# Patient Record
Sex: MALE | Race: BLACK OR AFRICAN AMERICAN | NOT HISPANIC OR LATINO | Employment: PART TIME | URBAN - METROPOLITAN AREA
[De-identification: names, ages, dates, MRNs, and addresses within clinical notes are randomized per-mention and may not be internally consistent; named-entity substitution may affect disease eponyms.]

---

## 2019-02-28 ENCOUNTER — TELEPHONE (OUTPATIENT)
Dept: NEUROLOGY | Facility: HOSPITAL | Age: 44
End: 2019-02-28

## 2019-02-28 DIAGNOSIS — R93.3 ABNORMAL FINDING ON GI TRACT IMAGING: Primary | ICD-10-CM

## 2019-02-28 DIAGNOSIS — R93.3 ABNORMAL FINDINGS ON EXAMINATION OF GASTROINTESTINAL TRACT: ICD-10-CM

## 2019-02-28 NOTE — TELEPHONE ENCOUNTER
----- Message from Isabell Hernandez sent at 2/27/2019  2:17 PM CST -----  Regarding: NEW  Contact: [phone]  [facility]    [facility]    Why do you need to be seen? Mesenteric Mass Primary unknown    Which doctor are you requesting? Tisha    You may contact patient back to schedule at: [phone] cell and home # [phone]    [facility] will be mailing CD's

## 2019-03-08 ENCOUNTER — TELEPHONE (OUTPATIENT)
Dept: NEUROLOGY | Facility: HOSPITAL | Age: 44
End: 2019-03-08

## 2019-03-08 DIAGNOSIS — C7A.00 MALIGNANT CARCINOID TUMOR OF UNKNOWN PRIMARY SITE: Primary | ICD-10-CM

## 2019-03-08 RX ORDER — DICYCLOMINE HYDROCHLORIDE 10 MG/1
10 CAPSULE ORAL
COMMUNITY
End: 2022-08-11

## 2019-03-08 RX ORDER — ONDANSETRON 4 MG/1
8 TABLET, ORALLY DISINTEGRATING ORAL
COMMUNITY
End: 2022-08-11

## 2019-03-08 RX ORDER — METOCLOPRAMIDE 10 MG/1
10 TABLET ORAL 4 TIMES DAILY
COMMUNITY
End: 2022-08-11

## 2019-03-08 NOTE — TELEPHONE ENCOUNTER
[facility].  Pt with mesenteric mass and suspected carcinoid tumor.  Pt had CT and MRI.  Ordered Ga 68 PET/CT and  tumor markers per protocol.  Appointment made with MD, info sent to patient.

## 2019-03-08 NOTE — TELEPHONE ENCOUNTER
----- Message from Isabell Hernandez sent at 2/27/2019  2:17 PM CST -----  Regarding: NEW  Contact: [phone]  [facility]    [facility]    Why do you need to be seen? Mesenteric Mass Primary unknown    Which doctor are you requesting? Tisha    You may contact patient back to schedule at: [phone] cell and home # [phone]    [facility] will be [facility] CD's

## 2019-03-14 ENCOUNTER — HOSPITAL ENCOUNTER (OUTPATIENT)
Dept: RADIOLOGY | Facility: HOSPITAL | Age: 44
Discharge: HOME | End: 2019-03-14
Attending: SURGERY
Payer: OTHER GOVERNMENT

## 2019-03-14 ENCOUNTER — OFFICE VISIT (OUTPATIENT)
Dept: NEUROLOGY | Facility: HOSPITAL | Age: 44
End: 2019-03-14
Attending: SURGERY
Payer: OTHER GOVERNMENT

## 2019-03-14 ENCOUNTER — DOCUMENTATION ONLY (OUTPATIENT)
Dept: NEUROLOGY | Facility: HOSPITAL | Age: 44
End: 2019-03-14

## 2019-03-14 VITALS
HEART RATE: 56 BPM | HEIGHT: 69 IN | WEIGHT: 241.5 LBS | SYSTOLIC BLOOD PRESSURE: 109 MMHG | BODY MASS INDEX: 35.77 KG/M2 | DIASTOLIC BLOOD PRESSURE: 71 MMHG | TEMPERATURE: 96 F

## 2019-03-14 DIAGNOSIS — C7B.8 SECONDARY NEUROENDOCRINE TUMOR OF DISTANT LYMPH NODES: ICD-10-CM

## 2019-03-14 DIAGNOSIS — C7A.012 MALIGNANT CARCINOID TUMOR OF ILEUM: Primary | ICD-10-CM

## 2019-03-14 DIAGNOSIS — C7A.00 MALIGNANT CARCINOID TUMOR OF UNKNOWN PRIMARY SITE: ICD-10-CM

## 2019-03-14 LAB
EXT 24 HR UR METANEPHRINE: ABNORMAL
EXT 24 HR UR NORMETANEPHRINE: ABNORMAL
EXT 24 HR UR NORMETANEPHRINE: ABNORMAL
EXT 25 HYDROXY VIT D2: ABNORMAL
EXT 25 HYDROXY VIT D3: ABNORMAL
EXT 5 HIAA 24 HR URINE: ABNORMAL
EXT 5 HIAA BLOOD: 37 NG/ML (ref 0–22)
EXT ACTH: ABNORMAL
EXT AFP: ABNORMAL
EXT ALBUMIN: 4.3 G/DL (ref 3.6–5.1)
EXT ALKALINE PHOSPHATASE: 83 U/L (ref 40–115)
EXT ALT: 42 U/L (ref 9–46)
EXT AMYLASE: ABNORMAL
EXT ANTI ISLET CELL AB: ABNORMAL
EXT ANTI PARIETAL CELL AB: ABNORMAL
EXT ANTI THYROID AB: ABNORMAL
EXT AST: 23 U/L (ref 10–40)
EXT BILIRUBIN DIRECT: ABNORMAL MG/DL
EXT BILIRUBIN TOTAL: 0.6 MG/DL (ref 0.2–1.2)
EXT BK VIRUS DNA QN PCR: ABNORMAL
EXT BUN: 11 MG/DL (ref 7–25)
EXT C PEPTIDE: ABNORMAL
EXT CA 125: ABNORMAL
EXT CA 19-9: ABNORMAL
EXT CA 27-29: ABNORMAL
EXT CALCITONIN: ABNORMAL
EXT CALCIUM: 9.4 MG/DL (ref 8.6–10.3)
EXT CEA: ABNORMAL
EXT CHLORIDE: 102 MMOL/L (ref 98–110)
EXT CHOLESTEROL: ABNORMAL
EXT CHROMOGRANIN A: 95 NG/ML (ref 25–140)
EXT CO2: 29 MMOL/L (ref 20–32)
EXT CREATININE UA: ABNORMAL
EXT CREATININE: 1.03 MG/DL (ref 0.6–1.35)
EXT CYCLOSPORONE LEVEL: ABNORMAL
EXT DOPAMINE: ABNORMAL
EXT EBV DNA BY PCR: ABNORMAL
EXT EPINEPHRINE: ABNORMAL
EXT FOLATE: ABNORMAL
EXT FREE T3: ABNORMAL
EXT FREE T4: ABNORMAL
EXT FSH: ABNORMAL
EXT GASTRIN RELEASING PEPTIDE: ABNORMAL
EXT GASTRIN RELEASING PEPTIDE: ABNORMAL
EXT GASTRIN: ABNORMAL
EXT GGT: ABNORMAL
EXT GHRELIN: ABNORMAL
EXT GLUCAGON: ABNORMAL
EXT GLUCOSE: 93 MG/DL (ref 65–139)
EXT GROWTH HORMONE: ABNORMAL
EXT HCV RNA QUANT PCR: ABNORMAL
EXT HDL: ABNORMAL
EXT HEMATOCRIT: ABNORMAL
EXT HEMOGLOBIN A1C: ABNORMAL %
EXT HEMOGLOBIN: ABNORMAL
EXT HISTAMINE 24 HR URINE: ABNORMAL
EXT HISTAMINE: ABNORMAL
EXT IGF-1: ABNORMAL
EXT IMMUNKNOW (NON-STIMULATED): ABNORMAL
EXT IMMUNKNOW (STIMULATED): ABNORMAL
EXT INR: ABNORMAL
EXT INSULIN: ABNORMAL
EXT LANREOTIDE LEVEL: ABNORMAL
EXT LDH, TOTAL: ABNORMAL
EXT LDL CHOLESTEROL: ABNORMAL
EXT LIPASE: ABNORMAL
EXT MAGNESIUM: ABNORMAL
EXT METANEPHRINE FREE PLASMA: ABNORMAL
EXT MOTILIN: ABNORMAL
EXT NEUROKININ A CAMB: ABNORMAL
EXT NEUROKININ A ISI: <10 PG/ML (ref 0–40)
EXT NEUROTENSIN: ABNORMAL
EXT NOREPINEPHRINE: ABNORMAL
EXT NORMETANEPHRINE: ABNORMAL
EXT NSE: ABNORMAL
EXT OCTREOTIDE LEVEL: ABNORMAL
EXT PANCREASTATIN CAMB: ABNORMAL
EXT PANCREASTATIN ISI: 179 PG/ML (ref 10–135)
EXT PANCREATIC POLYPEPTIDE: ABNORMAL
EXT PHOSPHORUS: ABNORMAL
EXT PLATELETS: ABNORMAL
EXT POTASSIUM: 4.2 MMOL/L (ref 3.5–5.3)
EXT PROGRAF LEVEL: ABNORMAL
EXT PROLACTIN: ABNORMAL
EXT PROTEIN TOTAL: 7.3 G/DL (ref 6.1–8.1)
EXT PROTEIN UA: ABNORMAL
EXT PT: ABNORMAL
EXT PTH, INTACT: ABNORMAL
EXT PTT: ABNORMAL
EXT RAPAMUNE LEVEL: ABNORMAL
EXT SEROTONIN: 1139 NG/ML (ref 56–244)
EXT SODIUM: 138 MMOL/L (ref 135–146)
EXT SOMATOSTATIN: ABNORMAL
EXT SUBSTANCE P: ABNORMAL
EXT TRIGLYCERIDES: ABNORMAL
EXT TRYPTASE: ABNORMAL
EXT TSH: ABNORMAL
EXT URIC ACID: ABNORMAL
EXT URINE AMYLASE U/HR: ABNORMAL
EXT URINE AMYLASE U/L: ABNORMAL
EXT VASOACTIVE INTESTINAL POLYPEPTIDE: ABNORMAL
EXT VITAMIN B12: ABNORMAL
EXT VMA 24 HR URINE: ABNORMAL
EXT WBC: ABNORMAL
NEURON SPECIFIC ENOLASE: ABNORMAL

## 2019-03-14 PROCEDURE — 78815 PET IMAGE W/CT SKULL-THIGH: CPT | Mod: 26,PS,, | Performed by: RADIOLOGY

## 2019-03-14 PROCEDURE — A9587 GALLIUM GA-68: HCPCS | Mod: TB

## 2019-03-14 PROCEDURE — 78815 NM PET 68GA DOTATATE WHOLE BODY: ICD-10-PCS | Mod: 26,PS,, | Performed by: RADIOLOGY

## 2019-03-14 PROCEDURE — 78815 PET IMAGE W/CT SKULL-THIGH: CPT | Mod: TC

## 2019-03-14 PROCEDURE — 99213 OFFICE O/P EST LOW 20 MIN: CPT | Mod: 25 | Performed by: SURGERY

## 2019-03-14 NOTE — PROGRESS NOTES
Study Name: NETDetect (A Study of Human Tumor Angiogenesis Using a Fibrin-Thrombin Clot Model)  IRB#: [facility] #[id number]/[facility] 2011.038.C    Spoke with patient and spouse (Emerald) at length regarding their participation in the NETDetect study. Both patient and wife agreed to participate in study. After informed consent was obtained from patient and spouse, I went through the demo kit and the Instructions for Use. Patient and spouse understand how to use kit and all questions were answered.

## 2019-03-14 NOTE — PROGRESS NOTES
"NOLANETS:  [facility]  A collaboration between [facility] and [facility]      PATIENT: Aubrey Gallagher  MRN: [id number]  DATE: 3/14/2019    Subjective:      Chief Complaint: Consult (NET pt woyj ,esemteroc ,ass/no PATH yet/ref  by [facility])    In February went to [facility] second visit  for abdominal pain. excruciting pain for 4 hours with no nausea or vomiting or diarrhea. CT scan showed mesenteric mass. MRI done confirmed mass. Had EGD and colonoscopy done there did not reveal etiology.  Had Gallium scan done here  No pain now or any symptoms    Vitals:   Vitals:    03/14/19 1214   BP: 109/71   Pulse: (!) 56   Temp: 96 °F (35.6 °C)   TempSrc: Oral   Weight: 109.5 kg (241 lb 8.2 oz)   Height: 5' 9" (1.753 m)        Karnofsky Score:     Diagnosis: No diagnosis found.     Oncologic History:     Interval History:     Past Medical History:  Past Medical History:   Diagnosis Date    Mesenteric mass        Past Surgical History:  History reviewed. No pertinent surgical history.    Family History:  History reviewed. No pertinent family history.    Allergies:  Review of patient's allergies indicates:   Allergen Reactions    Epinephrine      Neuroendocrine Tumor patient         Medications:  Current Outpatient Medications   Medication Sig Dispense Refill    dicyclomine (BENTYL) 10 MG capsule Take 10 mg by mouth 4 (four) times daily before meals and nightly.      metoclopramide HCl (REGLAN) 10 MG tablet Take 10 mg by mouth 4 (four) times daily.      ondansetron (ZOFRAN-ODT) 4 MG TbDL Take 8 mg by mouth every 12 (twelve) hours.       No current facility-administered medications for this visit.        Review of Systems   Constitutional: Negative for activity change, appetite change, chills, diaphoresis, fatigue, fever and unexpected weight change.   HENT: Negative for congestion, dental problem, drooling, ear discharge, ear pain, facial " swelling, mouth sores, postnasal drip, sinus pain, sneezing, sore throat, tinnitus, trouble swallowing and voice change.    Eyes: Negative for photophobia, pain, discharge and itching.   Respiratory: Negative for apnea, cough, shortness of breath, wheezing and stridor.    Cardiovascular: Negative for chest pain and leg swelling.   Gastrointestinal: Positive for abdominal pain. Negative for abdominal distention, blood in stool, nausea, rectal pain and vomiting.   Endocrine: Negative for cold intolerance, heat intolerance, polydipsia and polyphagia.   Genitourinary: Negative.    Musculoskeletal: Negative for back pain, gait problem, joint swelling, myalgias, neck pain and neck stiffness.   Skin: Negative for pallor, rash and wound.   Allergic/Immunologic: Negative for environmental allergies and immunocompromised state.   Neurological: Negative for dizziness, seizures, syncope, facial asymmetry, numbness and headaches.   Hematological: Negative for adenopathy. Does not bruise/bleed easily.   Psychiatric/Behavioral: Negative for confusion and decreased concentration.      Objective:      Physical Exam   Constitutional: He is oriented to person, place, and time. He appears well-developed and well-nourished. No distress.   HENT:   Head: Normocephalic and atraumatic.   Right Ear: External ear normal.   Left Ear: External ear normal.   Eyes: Conjunctivae and EOM are normal. Pupils are equal, round, and reactive to light.   Neck: Normal range of motion.   Cardiovascular: Regular rhythm.   Pulmonary/Chest: Effort normal and breath sounds normal.   Abdominal: Soft. Bowel sounds are normal. He exhibits no mass. There is no rebound and no guarding. No hernia.   Musculoskeletal: Normal range of motion.   Neurological: He is alert and oriented to person, place, and time.   Skin: Skin is warm. He is not diaphoretic.   Psychiatric: He has a normal mood and affect. His behavior is normal. Thought content normal.      Assessment:        No diagnosis found.    Laboratory Data:   OQOax Viewer      Show images for NM PET Ga68 Dotatate Whole Body   PACS Images for ViTAL [place] Viewer (Includes Mister Mario Images)      Show images for NM PET Ga68 Dotatate Whole Body   NM PET Ga68 Dotatate Whole Body   Order: [id number]   Status:  Final result   Visible to patient:  No (Not Released) Next appt:  None Dx:  Malignant carcinoid tumor of unknown ...   Details     Reading Physician Reading Date Result Priority   Hernan Mendiola MD 3/14/2019    Emanuel Palomino MD 3/14/2019       Narrative     EXAMINATION:  NM PET 68GA DOTATATE WHOLE BODY    CLINICAL HISTORY:  Malignant carcinoid tumor of unspecified site    TECHNIQUE:  5.2 mCi of GA68 Dotatate was administered intravenously in the right antecubital fossa. After an approximately 60 min distribution time, PET/CT images were acquired from the skull vertex to the mid thigh. Transmission images were acquired to correct for attenuation using a whole body low-dose CT scan without contrast with the arms positioned above the head.    COMPARISON:  Outside hospital CT 02/20/2019.    FINDINGS:  Quality of the study: Adequate.    There is a 3.6 cm mesenteric mass demonstrating SUV max of 40.6. No other abnormal foci of increased tracer uptake are present.    Physiologic uptake of the tracer is seen within the pituitary, liver, spleen, kidneys, adrenal glands and bowel.    Incidental CT findings: Heart is enlarged with trace pericardial fluid.  Rectum is filled with stool.  Appendix is normal.      Impression       FDG-avid mesenteric mass concerning for carcinoid.  No evidence of metastasis.    Electronically signed by resident: Emanuel Palomino  Date: 03/14/2019  Time: 08:51    Electronically signed by: Hernan Mendiola MD  Date: 03/14/2019  Time: 09:58             Last Resulted: 03/14/19 09:58 Order Details View Encounter Lab and Collection Details Routing Result History            External Result Report      External Result Report   Narrative     EXAMINATION:  NM PET 68GA DOTATATE WHOLE BODY    CLINICAL HISTORY:  Malignant carcinoid tumor of unspecified site    TECHNIQUE:  5.2 mCi of GA68 Dotatate was administered intravenously in the right antecubital fossa. After an approximately 60 min distribution time, PET/CT images were acquired from the skull vertex to the mid thigh. Transmission images were acquired to correct for attenuation using a whole body low-dose CT scan without contrast with the arms positioned above the head.    COMPARISON:  Outside hospital CT 02/20/2019.    FINDINGS:  Quality of the study: Adequate.    There is a 3.6 cm mesenteric mass demonstrating SUV max of 40.6. No other abnormal foci of increased tracer uptake are present.    Physiologic uptake of the tracer is seen within the pituitary, liver, spleen, kidneys, adrenal glands and bowel.    Incidental CT findings: Heart             Impression:  This is a 43-year-old gentleman who went to the emergency room with excruciating abdominal pain and workup showed mesenteric mass.  [facility] and was referred here he has a gallium scan that is consistent with mesenteric mass with a positive somatostatin receptor uptake.  He has ileal primary with mesenteric mass.  He requires carcinoid labs, echocardiogram and an explored laparotomy with small-bowel resection, mesenteric lymphadenectomy, cholecystectomy and exploration of the liver.    I spent a lot of time talking to him and his wife about his condition the natural history of the carcinoid disease.  The difference between the carcinoid and other cancers.   I also talked to them about the treatment options.   IA outlined about the surgery the recovery time the risks and benefits of the surgery such as bleeding infection DVT PE MI redo surgery for leaks and bleeding.  I also informed them the the purpose of surgery is to debulk and not to be curative and  there is a chance of recurrence and usually happens in about 6-10 years time.  I emphasized the importance of for close follow-up with scans as this tumor needs to be followed up. At the time of exploration in addition to the primary and the javier Mets I have also a explore the other aspects of the belly and liver and make sure there is no other disease involved.   would be the basis for him requiring long-acting octreotide therapy.    I answered all the questions a showed them the picture elaborated the surgery and the recovery time and long-term plans.    I also talked about other studies where we preserve our tumor specimen RNA later solution and also talked to him about other stool studies that he apparently doing.  Plan:       Carcinoid tumor markers  Cardiac echo  Plan for ex lap small-bowel resection mesenteric lymphadenectomy cholecystectomy.  Spent in spent more than 45 min in talking to them and explained to him about the surgery                  MILENA Plummer MD, FACS   Associate Professor of Surgery, [facility]   Neuroendocrine Surgery, Hepatic/Pancreatic & General Surgery   [address]   [place] [address]   ph. [phone]; [phone]   fax. [phone]

## 2019-03-14 NOTE — PATIENT INSTRUCTIONS
Blood work / Lab work / Tumor markers  Get lab work drawn today --- [facility] [address]    Echo every year - due now --- orders given to patient    Surgery: patient will call the office back with a decision to proceed with surgery

## 2019-04-15 ENCOUNTER — TELEPHONE (OUTPATIENT)
Dept: NEUROLOGY | Facility: HOSPITAL | Age: 44
End: 2019-04-15

## 2019-04-22 ENCOUNTER — TELEPHONE (OUTPATIENT)
Dept: NEUROLOGY | Facility: HOSPITAL | Age: 44
End: 2019-04-22

## 2019-04-22 NOTE — TELEPHONE ENCOUNTER
----- Message from Jessica Corwley sent at 4/22/2019 11:07 AM CDT -----  Contact: wife/Emerald   [phone]  KORY--Patient wife calling to speak with you about scheduling the patient surgery.  Please call back to assist [phone]

## 2019-04-22 NOTE — TELEPHONE ENCOUNTER
Emerald, wife, requesting if Dr. Guardado has responding to request to have surgery in Oct/Nov 2019.  Pt has intermittent abdominal pain but would like to wait until later this year. Pt to be notified with Dr. Guardado's recommendations.

## 2019-04-30 ENCOUNTER — TELEPHONE (OUTPATIENT)
Dept: NEUROLOGY | Facility: HOSPITAL | Age: 44
End: 2019-04-30

## 2019-04-30 ENCOUNTER — PATIENT MESSAGE (OUTPATIENT)
Dept: NEUROLOGY | Facility: HOSPITAL | Age: 44
End: 2019-04-30

## 2019-04-30 NOTE — TELEPHONE ENCOUNTER
Message placed on voicemail.  Dr. Guardado recommend if pt in pain, surgery is recommend sooner than later.

## 2019-05-25 ENCOUNTER — OFFICE VISIT (OUTPATIENT)
Dept: URGENT CARE | Facility: CLINIC | Age: 44
End: 2019-05-25
Payer: OTHER GOVERNMENT

## 2019-05-25 DIAGNOSIS — R76.11 NEWLY CONVERTED POSITIVE PPD TEST: Primary | ICD-10-CM

## 2019-05-25 PROCEDURE — 99499 NO LOS: ICD-10-PCS | Mod: S$GLB,,, | Performed by: FAMILY MEDICINE

## 2019-05-25 PROCEDURE — 71045 X-RAY EXAM CHEST 1 VIEW: CPT | Mod: S$GLB,,, | Performed by: RADIOLOGY

## 2019-05-25 PROCEDURE — 99499 UNLISTED E&M SERVICE: CPT | Mod: S$GLB,,, | Performed by: FAMILY MEDICINE

## 2019-05-25 PROCEDURE — 71045 XR CHEST 1 VIEW: ICD-10-PCS | Mod: S$GLB,,, | Performed by: RADIOLOGY

## 2019-05-25 NOTE — PROGRESS NOTES
Pt is here for a positive TB read - 11 mm induration.  Asymptomatic.  Denies fever, chills, cough, chest pain, night sweats.  No history of TB or positive TB result in the past.    Xr Chest 1 View    Result Date: 5/25/2019  EXAMINATION: XR CHEST 1 VIEW CLINICAL HISTORY: Nonspecific reaction to tuberculin skin test without active tuberculosis TECHNIQUE: Single frontal view of the chest was performed. COMPARISON: None FINDINGS: The lungs are clear, with normal appearance of pulmonary vasculature and no pleural effusion or pneumothorax. The cardiac silhouette is normal in size. The hilar and mediastinal contours are unremarkable. Bones are intact.     No acute abnormality. Electronically signed by: Hernan Mendiola MD Date:    05/25/2019 Time:    13:29

## 2019-05-27 ENCOUNTER — TELEPHONE (OUTPATIENT)
Dept: URGENT CARE | Facility: CLINIC | Age: 44
End: 2019-05-27

## 2019-05-27 NOTE — TELEPHONE ENCOUNTER
Called patient to inform him of official negative chest x-ray read by Radiology, and that he is being referred to ID for positive PPD reading.  No answer, unable to the message.

## 2019-05-27 NOTE — PROGRESS NOTES
Subjective:       Patient ID: Aubrey Gallagher is a 44 y.o. male.    Vitals:  vitals were not taken for this visit.     Chief Complaint: No chief complaint on file.    Patient presents for PPD reading, ppd test reason was routine for [facility]..  Reading was positive at 11 mm induration.  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  He has not had any previous positive TB tests.  He has never had a BCG vaccine.  Patient denies any symptoms at this time.  Denies weight loss, fatigue, fever, cough/hemoptysis.       Constitution: Negative for chills, sweating, fatigue and fever.   HENT: Negative for ear pain, congestion, sinus pain, sinus pressure, sore throat and voice change.    Neck: Negative for painful lymph nodes.   Eyes: Negative for eye redness.   Respiratory: Negative for chest tightness, cough, sputum production, bloody sputum, COPD, shortness of breath, stridor, wheezing and asthma.    Gastrointestinal: Negative for nausea and vomiting.   Musculoskeletal: Negative for muscle ache.   Skin: Negative for rash.   Allergic/Immunologic: Negative for seasonal allergies and asthma.   Hematologic/Lymphatic: Negative for swollen lymph nodes.       Objective:      Physical Exam   Constitutional: He is oriented to person, place, and time. He appears well-developed and well-nourished. He is cooperative.  Non-toxic appearance. He does not appear ill. No distress.   HENT:   Head: Normocephalic and atraumatic.   Right Ear: Hearing, tympanic membrane, external ear and ear canal normal.   Left Ear: Hearing, tympanic membrane, external ear and ear canal normal.   Nose: Nose normal. No mucosal edema, rhinorrhea or nasal deformity. No epistaxis. Right sinus exhibits no maxillary sinus tenderness and no frontal sinus tenderness. Left sinus exhibits no maxillary sinus tenderness and no frontal sinus tenderness.   Mouth/Throat: Uvula is midline, oropharynx is clear and moist and mucous membranes are normal.  No trismus in the jaw. Normal dentition. No uvula swelling. No posterior oropharyngeal erythema.   Eyes: Conjunctivae and lids are normal. No scleral icterus.   Sclera clear bilat   Neck: Trachea normal, full passive range of motion without pain and phonation normal. Neck supple.   Cardiovascular: Normal rate, regular rhythm, normal heart sounds, intact distal pulses and normal pulses.   Pulmonary/Chest: Effort normal and breath sounds normal. No respiratory distress.   Abdominal: Soft. Normal appearance and bowel sounds are normal. He exhibits no distension. There is no tenderness.   Musculoskeletal: Normal range of motion. He exhibits no edema or deformity.   Neurological: He is alert and oriented to person, place, and time. He exhibits normal muscle tone. Coordination normal.   Skin: Skin is warm, dry and intact. He is not diaphoretic. No pallor.   Psychiatric: He has a normal mood and affect. His speech is normal and behavior is normal. Judgment and thought content normal. Cognition and memory are normal.   Nursing note and vitals reviewed.        Xr Chest 1 View    Result Date: 5/25/2019  EXAMINATION: XR CHEST 1 VIEW CLINICAL HISTORY: Nonspecific reaction to tuberculin skin test without active tuberculosis TECHNIQUE: Single frontal view of the chest was performed. COMPARISON: None FINDINGS: The lungs are clear, with normal appearance of pulmonary vasculature and no pleural effusion or pneumothorax. The cardiac silhouette is normal in size. The hilar and mediastinal contours are unremarkable. Bones are intact.     No acute abnormality. Electronically signed by: Hernan Mendiola MD Date:    05/25/2019 Time:    13:29    Assessment:       1. Newly converted positive PPD test        Plan:         Newly converted positive PPD test  -     XR CHEST 1 VIEW; Future; Expected date: 05/25/2019  -     Ambulatory referral to Infectious Disease

## 2019-06-04 ENCOUNTER — OFFICE VISIT (OUTPATIENT)
Dept: INFECTIOUS DISEASES | Facility: CLINIC | Age: 44
End: 2019-06-04
Payer: OTHER GOVERNMENT

## 2019-06-04 ENCOUNTER — TELEPHONE (OUTPATIENT)
Dept: NEUROLOGY | Facility: HOSPITAL | Age: 44
End: 2019-06-04

## 2019-06-04 ENCOUNTER — LAB VISIT (OUTPATIENT)
Dept: LAB | Facility: HOSPITAL | Age: 44
End: 2019-06-04
Payer: OTHER GOVERNMENT

## 2019-06-04 VITALS
WEIGHT: 246.69 LBS | HEIGHT: 69 IN | BODY MASS INDEX: 36.54 KG/M2 | HEART RATE: 57 BPM | DIASTOLIC BLOOD PRESSURE: 79 MMHG | SYSTOLIC BLOOD PRESSURE: 123 MMHG | TEMPERATURE: 99 F

## 2019-06-04 DIAGNOSIS — Z22.7 TB LUNG, LATENT: ICD-10-CM

## 2019-06-04 DIAGNOSIS — Z22.7 TB LUNG, LATENT: Primary | ICD-10-CM

## 2019-06-04 LAB
ALBUMIN SERPL BCP-MCNC: 3.9 G/DL (ref 3.5–5.2)
ALP SERPL-CCNC: 78 U/L (ref 55–135)
ALT SERPL W/O P-5'-P-CCNC: 32 U/L (ref 10–44)
ANION GAP SERPL CALC-SCNC: 8 MMOL/L (ref 8–16)
AST SERPL-CCNC: 24 U/L (ref 10–40)
BASOPHILS # BLD AUTO: 0.07 K/UL (ref 0–0.2)
BASOPHILS NFR BLD: 1.2 % (ref 0–1.9)
BILIRUB SERPL-MCNC: 0.5 MG/DL (ref 0.1–1)
BUN SERPL-MCNC: 14 MG/DL (ref 6–20)
CALCIUM SERPL-MCNC: 9.8 MG/DL (ref 8.7–10.5)
CHLORIDE SERPL-SCNC: 104 MMOL/L (ref 95–110)
CO2 SERPL-SCNC: 27 MMOL/L (ref 23–29)
CREAT SERPL-MCNC: 1.1 MG/DL (ref 0.5–1.4)
DIFFERENTIAL METHOD: NORMAL
EOSINOPHIL # BLD AUTO: 0 K/UL (ref 0–0.5)
EOSINOPHIL NFR BLD: 0.7 % (ref 0–8)
ERYTHROCYTE [DISTWIDTH] IN BLOOD BY AUTOMATED COUNT: 13.9 % (ref 11.5–14.5)
EST. GFR  (AFRICAN AMERICAN): >60 ML/MIN/1.73 M^2
EST. GFR  (NON AFRICAN AMERICAN): >60 ML/MIN/1.73 M^2
GLUCOSE SERPL-MCNC: 116 MG/DL (ref 70–110)
HCT VFR BLD AUTO: 46.1 % (ref 40–54)
HGB BLD-MCNC: 15 G/DL (ref 14–18)
HIV 1+2 AB+HIV1 P24 AG SERPL QL IA: NEGATIVE
IMM GRANULOCYTES # BLD AUTO: 0.01 K/UL (ref 0–0.04)
IMM GRANULOCYTES NFR BLD AUTO: 0.2 % (ref 0–0.5)
LYMPHOCYTES # BLD AUTO: 1.5 K/UL (ref 1–4.8)
LYMPHOCYTES NFR BLD: 26.9 % (ref 18–48)
MCH RBC QN AUTO: 27.9 PG (ref 27–31)
MCHC RBC AUTO-ENTMCNC: 32.5 G/DL (ref 32–36)
MCV RBC AUTO: 86 FL (ref 82–98)
MONOCYTES # BLD AUTO: 0.5 K/UL (ref 0.3–1)
MONOCYTES NFR BLD: 9.1 % (ref 4–15)
NEUTROPHILS # BLD AUTO: 3.5 K/UL (ref 1.8–7.7)
NEUTROPHILS NFR BLD: 61.9 % (ref 38–73)
NRBC BLD-RTO: 0 /100 WBC
PLATELET # BLD AUTO: 254 K/UL (ref 150–350)
PMV BLD AUTO: 10.4 FL (ref 9.2–12.9)
POTASSIUM SERPL-SCNC: 4.4 MMOL/L (ref 3.5–5.1)
PROT SERPL-MCNC: 7.4 G/DL (ref 6–8.4)
RBC # BLD AUTO: 5.38 M/UL (ref 4.6–6.2)
SODIUM SERPL-SCNC: 139 MMOL/L (ref 136–145)
WBC # BLD AUTO: 5.69 K/UL (ref 3.9–12.7)

## 2019-06-04 PROCEDURE — 86703 HIV-1/HIV-2 1 RESULT ANTBDY: CPT

## 2019-06-04 PROCEDURE — 99203 OFFICE O/P NEW LOW 30 MIN: CPT | Mod: S$PBB,,, | Performed by: INTERNAL MEDICINE

## 2019-06-04 PROCEDURE — 85025 COMPLETE CBC W/AUTO DIFF WBC: CPT

## 2019-06-04 PROCEDURE — 99999 PR PBB SHADOW E&M-EST. PATIENT-LVL III: CPT | Mod: PBBFAC,,, | Performed by: INTERNAL MEDICINE

## 2019-06-04 PROCEDURE — 36415 COLL VENOUS BLD VENIPUNCTURE: CPT

## 2019-06-04 PROCEDURE — 80053 COMPREHEN METABOLIC PANEL: CPT

## 2019-06-04 PROCEDURE — 99213 OFFICE O/P EST LOW 20 MIN: CPT | Mod: PBBFAC | Performed by: INTERNAL MEDICINE

## 2019-06-04 PROCEDURE — 99999 PR PBB SHADOW E&M-EST. PATIENT-LVL III: ICD-10-PCS | Mod: PBBFAC,,, | Performed by: INTERNAL MEDICINE

## 2019-06-04 PROCEDURE — 99203 PR OFFICE/OUTPT VISIT, NEW, LEVL III, 30-44 MIN: ICD-10-PCS | Mod: S$PBB,,, | Performed by: INTERNAL MEDICINE

## 2019-06-04 NOTE — TELEPHONE ENCOUNTER
----- Message from Sol JOINERMichael Mohr sent at 6/4/2019  1:53 PM CDT -----  Contact: [phone] self   KORY    Pt is requesting to speak you concerning his positive TB test. He would also like to ask you questions about a procedure. Please advise

## 2019-06-04 NOTE — TELEPHONE ENCOUNTER
Pt recently dx with latent tb with moderate risk.  Pt requesting to talk to Dr. Guardado on choice of medication regimen.  Clinic appt scheduled with pt on Monday, 6/10/19 at 915am.  Pt acknowledged understanding.

## 2019-06-04 NOTE — PROGRESS NOTES
Subjective:      Patient ID: Aubrey Gallagher is a 44 y.o. male.    Chief Complaint:No chief complaint on file.      History of Present Illness  Case of 45 y/o male with PMHx: carcinoid tumor. Patient comes to ID for evaluation after positive routine yearly TST done as part of [facility], [place] screening for TB. Resulted >11mm induration. Patient denied to his knowledge having previous positive TB test. 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 Reports during his training had an interaction with a person that was later diagnosed with TB. Denied fevers, night sweats, unintentional weight loss, cough, SOB. Patient has traveled to the [place] and [facility] as part of Cruise vacations. 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 Denied contact with homeless or incarcerated people. Not currently on any home meds but will have in the near future ExLap for carcinoid tumor resection.             Review of Systems   Constitution: Negative for chills, decreased appetite, fever, malaise/fatigue, night sweats, weight gain and weight loss.   HENT: Negative for congestion, ear pain, hearing loss, hoarse voice, sore throat and tinnitus.    Eyes: Negative for blurred vision, redness and visual disturbance.   Cardiovascular: Negative for chest pain, leg swelling and palpitations.   Respiratory: Negative for cough, hemoptysis, shortness of breath and sputum production.    Hematologic/Lymphatic: Negative for adenopathy. Does not bruise/bleed easily.   Skin: Negative for dry skin, itching, rash and suspicious lesions.   Musculoskeletal: Negative for back pain, joint pain, myalgias and neck pain.   Gastrointestinal: Negative for abdominal pain, constipation, diarrhea, heartburn, nausea and vomiting.   Genitourinary: Negative for dysuria, flank pain, frequency, hematuria, hesitancy and urgency.   Neurological: Negative for dizziness, headaches, numbness, paresthesias and weakness.   Psychiatric/Behavioral: Negative for depression  and memory loss. The patient does not have insomnia and is not nervous/anxious.      Objective:   Physical Exam   Constitutional: He is oriented to person, place, and time. He appears well-developed and well-nourished.   HENT:   Head: Normocephalic and atraumatic.   Eyes: Pupils are equal, round, and reactive to light. Conjunctivae and EOM are normal.   Neck: Normal range of motion. Neck supple.   Cardiovascular: Normal rate, regular rhythm and normal heart sounds.   Pulmonary/Chest: Effort normal and breath sounds normal.   Abdominal: Soft. Bowel sounds are normal. He exhibits no distension. There is no tenderness. There is no rebound.   Musculoskeletal: Normal range of motion. He exhibits no edema, tenderness or deformity.   Neurological: He is alert and oriented to person, place, and time.   Skin: No rash noted. No erythema.     Assessment:       1. 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 Patient at this time not experiencing any symptoms to suggest active TB infection, review of CXR with no evidence of cavitary lesions or adenopathy. At this time recommend to pursue treatment for latent TB. Because patient will undergo procedure in the near future recommend INH with B6 for 9 month period to avoid potential drug-drug interaction if started on medications during or after surgical procedure. Obtained baseline CMP and CBC that are WNL as well as HIV test pending result. Patient ambivalent of initiating treatment. Counseled extensively on lifetime risk of developing active TB 5-10% as well as increased risk if developed immunocompromising illness or require use of biologics in the future. Strongly advised to pursue therapy. Will arrange follow up in 1 month.       Plan:       TB lung, latent  -     Comprehensive metabolic panel; Future; Expected date: 06/04/2019  -     HIV 1/2 Ag/Ab (4th Gen); Future; Expected date: 06/04/2019  -     CBC  auto differential; Future; Expected date: 06/04/2019      RTC in 1 month

## 2019-06-10 ENCOUNTER — OFFICE VISIT (OUTPATIENT)
Dept: NEUROLOGY | Facility: HOSPITAL | Age: 44
End: 2019-06-10
Attending: SURGERY
Payer: OTHER GOVERNMENT

## 2019-06-10 VITALS
DIASTOLIC BLOOD PRESSURE: 71 MMHG | HEART RATE: 57 BPM | TEMPERATURE: 98 F | BODY MASS INDEX: 36.54 KG/M2 | SYSTOLIC BLOOD PRESSURE: 120 MMHG | HEIGHT: 69 IN | WEIGHT: 246.69 LBS

## 2019-06-10 DIAGNOSIS — R10.84 GENERALIZED ABDOMINAL PAIN: ICD-10-CM

## 2019-06-10 DIAGNOSIS — C7A.012 MALIGNANT CARCINOID TUMOR OF ILEUM: Primary | ICD-10-CM

## 2019-06-10 PROCEDURE — 99215 OFFICE O/P EST HI 40 MIN: CPT | Performed by: SURGERY

## 2019-06-10 RX ORDER — PREGABALIN 75 MG/1
75 CAPSULE ORAL
Status: CANCELLED | OUTPATIENT
Start: 2019-06-10

## 2019-06-10 RX ORDER — FAMOTIDINE 10 MG/ML
20 INJECTION INTRAVENOUS
Status: CANCELLED | OUTPATIENT
Start: 2019-06-10

## 2019-06-10 RX ORDER — ACETAMINOPHEN 500 MG
1000 TABLET ORAL
Status: CANCELLED | OUTPATIENT
Start: 2019-06-10 | End: 2019-06-10

## 2019-06-10 RX ORDER — ENOXAPARIN SODIUM 100 MG/ML
30 INJECTION SUBCUTANEOUS
Status: CANCELLED | OUTPATIENT
Start: 2019-06-10

## 2019-06-10 RX ORDER — ONDANSETRON 2 MG/ML
8 INJECTION INTRAMUSCULAR; INTRAVENOUS
Status: CANCELLED | OUTPATIENT
Start: 2019-06-10

## 2019-06-10 RX ORDER — KETOROLAC TROMETHAMINE 15 MG/ML
15 INJECTION, SOLUTION INTRAMUSCULAR; INTRAVENOUS
Status: CANCELLED | OUTPATIENT
Start: 2019-06-10 | End: 2019-06-13

## 2019-06-10 NOTE — PROGRESS NOTES
"NOLANETS:  [facility]  A collaboration between [facility] and [facility]      PATIENT: Aubrey Gallagher  MRN: [id number]  DATE: 6/10/2019    Subjective:      Chief Complaint: Follow-up  developed positive PPD test. All the previous PPD test were negative  Is here for consultation about starting INH.  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     Oncologic History:     Interval History:     Past Medical History:  Past Medical History:   Diagnosis Date    Mesenteric mass        Past Surgical History:  History reviewed. No pertinent surgical history.    Family History:  History reviewed. No pertinent family history.    Allergies:  Review of patient's allergies indicates:   Allergen Reactions    Epinephrine      Neuroendocrine Tumor patient      Ciprofloxacin Other (See Comments)     Cramping        Medications:  Current Outpatient Medications   Medication Sig Dispense Refill    dicyclomine (BENTYL) 10 MG capsule Take 10 mg by mouth 4 (four) times daily before meals and nightly.      metoclopramide HCl (REGLAN) 10 MG tablet Take 10 mg by mouth 4 (four) times daily.      ondansetron (ZOFRAN-ODT) 4 MG TbDL Take 8 mg by mouth every 12 (twelve) hours.       No current facility-administered medications for this visit.        Review of Systems   Constitutional: Negative for activity change, appetite " change, chills, diaphoresis, fatigue, fever and unexpected weight change.   HENT: Negative for congestion, dental problem, drooling, ear discharge, ear pain, hearing loss, sinus pressure and tinnitus.    Eyes: Negative for photophobia, pain, redness, itching and visual disturbance.   Respiratory: Negative for apnea, cough, choking, chest tightness, shortness of breath, wheezing and stridor.    Cardiovascular: Negative for chest pain, palpitations and leg swelling.   Gastrointestinal: Negative for abdominal pain, constipation and diarrhea.   Endocrine: Negative.    Genitourinary: Negative.    Musculoskeletal: Negative for arthralgias, back pain, gait problem, neck pain and neck stiffness.   Skin: Negative.    Allergic/Immunologic: Negative.    Neurological: Negative for tremors, seizures, syncope and speech difficulty.   Hematological: Negative.    Psychiatric/Behavioral: Negative.       Objective:      Physical Exam   Constitutional: He is oriented to person, place, and time. He appears well-developed and well-nourished. No distress.   HENT:   Head: Normocephalic and atraumatic.   Right Ear: External ear normal.   Left Ear: External ear normal.   Eyes: Pupils are equal, round, and reactive to light. Conjunctivae and EOM are normal.   Neck: Normal range of motion.   Cardiovascular: Normal rate and regular rhythm.   Pulmonary/Chest: Effort normal and breath sounds normal.   Abdominal: Soft. Bowel sounds are normal. He exhibits no mass. There is no tenderness. There is no rebound. No hernia.   Musculoskeletal: Normal range of motion.   Neurological: He is alert and oriented to person, place, and time.   Skin: Skin is warm and dry. He is not diaphoretic.   Psychiatric: He has a normal mood and affect.      Assessment:       No diagnosis found.    Laboratory Data:   Results for LINDA DAVENPORT (MRN [id number]) as of 6/10/2019 13:00   Ref. Range 3/14/2019 08:30 5/25/2019 13:23 6/4/2019 09:53   WBC Latest Ref Range: 3.90  - 12.70 K/uL   5.69   RBC Latest Ref Range: 4.60 - 6.20 M/uL   5.38   Hemoglobin Latest Ref Range: 14.0 - 18.0 g/dL   15.0   Hematocrit Latest Ref Range: 40.0 - 54.0 %   46.1   MCV Latest Ref Range: 82 - 98 fL   86   MCH Latest Ref Range: 27.0 - 31.0 pg   27.9   MCHC Latest Ref Range: 32.0 - 36.0 g/dL   32.5   RDW Latest Ref Range: 11.5 - 14.5 %   13.9   Platelets Latest Ref Range: 150 - 350 K/uL   254   MPV Latest Ref Range: 9.2 - 12.9 fL   10.4   Gran% Latest Ref Range: 38.0 - 73.0 %   61.9   Gran # (ANC) Latest Ref Range: 1.8 - 7.7 K/uL   3.5   Lymph% Latest Ref Range: 18.0 - 48.0 %   26.9   Lymph # Latest Ref Range: 1.0 - 4.8 K/uL   1.5   Mono% Latest Ref Range: 4.0 - 15.0 %   9.1   Mono # Latest Ref Range: 0.3 - 1.0 K/uL   0.5   Eosinophil% Latest Ref Range: 0.0 - 8.0 %   0.7   Eos # Latest Ref Range: 0.0 - 0.5 K/uL   0.0   Basophil% Latest Ref Range: 0.0 - 1.9 %   1.2   Baso # Latest Ref Range: 0.00 - 0.20 K/uL   0.07   nRBC Latest Ref Range: 0 /100 WBC   0   Differential Method Unknown   Automated   Immature Grans (Abs) Latest Ref Range: 0.00 - 0.04 K/uL   0.01   Immature Granulocytes Latest Ref Range: 0.0 - 0.5 %   0.2   Sodium Latest Ref Range: 136 - 145 mmol/L   139   Potassium Latest Ref Range: 3.5 - 5.1 mmol/L   4.4   Chloride Latest Ref Range: 95 - 110 mmol/L   104   CO2 Latest Ref Range: 23 - 29 mmol/L   27   Anion Gap Latest Ref Range: 8 - 16 mmol/L   8   BUN, Bld Latest Ref Range: 6 - 20 mg/dL   14   Creatinine Latest Ref Range: 0.5 - 1.4 mg/dL   1.1   eGFR if non African American Latest Ref Range: >60 mL/min/1.73 m^2   >60.0   eGFR if African American Latest Ref Range: >60 mL/min/1.73 m^2   >60.0   Glucose Latest Ref Range: 70 - 110 mg/dL   116 (H)   Calcium Latest Ref Range: 8.7 - 10.5 mg/dL   9.8   Alkaline Phosphatase Latest Ref Range: 55 - 135 U/L   78   PROTEIN TOTAL Latest Ref Range: 6.0 - 8.4 g/dL   7.4   Albumin Latest Ref Range: 3.5 - 5.2 g/dL   3.9   BILIRUBIN TOTAL Latest Ref Range: 0.1 -  1.0 mg/dL   0.5   AST Latest Ref Range: 10 - 40 U/L   24   ALT Latest Ref Range: 10 - 44 U/L   32   HIV 1/2 Ag/Ab Latest Ref Range: Negative    Negative   XR CHEST 1 VIEW Unknown  Rpt    NM PET 68GA DOTATATE WHOLE BODY Unknown Rpt         Impression:  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  All this time his PPD test had been negative.  Next for he was seen by infectious disease specialist was recommended 9 month course of INH with B6.    He does not have any symptoms nausea chest x-ray shows any evidence of any infiltrate.    Since his chance of dio active disease is less than 10% I would proceed with surgery and after the recovery from the surgery can start INH therapy.  I do not want start INH now and stop around the time of surgery which diffuse the purpose of prophylaxis as this may cause resistance.    I do not want INH to be taken in the perioperative period.    I will order for TB gold test. Prior to the surgery will repeat CT of the chest and abdomen. Since his disease is mainly in the ileum and mesenteric lymph node and may not require extensive debulking would not anticipate him to be significantly immunosuppressed in the postop..  Once he is done with the surgery he can be started on INH with B6  Plan:       Obtain TB gold test  Repeat CT of the chest and abdomen  Follow-up with results    Spent a lot of time talking to him and his wife who is a nurse at [employer] and informed them about all the possibilities and the benefits and risk of each approach.    Even before my visit, they do not want to start INH before surgery. They are thinking in terms of surgery sometime next month.               MILENA Plummer MD, FACS   Associate Professor of Surgery, [facility]   Neuroendocrine Surgery, Hepatic/Pancreatic & General Surgery    [address], [address]   [place] [address]   ph. [phone]; [phone]   fax. [phone]

## 2019-06-10 NOTE — PATIENT INSTRUCTIONS
Return to clinic to discuss surgery on 7/15      Patient Instructions     Take a Hibiclens shower twice a day for 3 days prior to surgery, including the morning of surgery.   Gargle with Listerine twice a day for 2 days prior to surgery, including the morning of surgery.      7/15/2019  Appointment with Anesthesia    Pre [place] for Hospital Admission - Go to 1st floor of the hospital-admitting desk. You will do paper work, get blood drawn,  get x-rays and see Anesthesia at this time.     7/15/2019   CLEAR LIQUIDS all day   Start bowel prep  Ducolax Laxative tablets - 2 tablets at 12 noon  Magnesium Citrate - 1 bottle at 2 pm   Do not eat or drink anything after midnight.   Antibiotics                  7/16/2019   Hospital Admission for surgery.  Report to 2nd floor, Same Day Surgery desk at 5:30am   Surgery is scheduled for 7:00am        [facility] [address] [place]  [address]  [phone]      INFORMATION REGARDING YOUR PROCEDURE      We look forward to serving you and your family and appreciate that you have chosen [facility] for your healthcare needs. Before, during, and after your surgery, you will be cared for by skilled medical professionals. Our surgeons, anesthesiologists, nurses,  and other healthcare professionals will work with you and your family to ensure a safe, smooth and comfortable surgery and recovery.    In order to best meet your pre-admission needs, your surgeon or ordering physicians office will contact our Scheduling Office at [phone] and schedule a Pre-Procedure Appointment.  This should preferably be done 72 hours or greater before your scheduled procedure date.     During your pre-procedure visit your insurance will be verified for your procedure. You will meet with a Registered Nurse and an Anesthesiologist or Nurse Anesthetist. If tests are required, they will be performed during your visit. Please allow about one and a half  hours for this visit.      You will need to bring the following information or items with you to your Pre-Procedure Appointment:    1.  Picture Identification  2.  Insurance Card  3.  Current list of medications to include name and dosage  4.  List of allergies  5.  Orders and any other forms your doctor has given to you  6.  Copies of lab results performed at other facilities. This may include blood work, EKGs or chest xrays.   These results can be faxed to [phone].    The Pre-Op Center Registration Desk is located on the first floor of the hospital ([address] [place] [address]) in the [facility].  The Pre-Operative Center is located on the second floor of the hospital. Someone will walk you from the registration area to the Pre-Operative Center.    Free parking is located in the front of the hospital and medical office building and is easily accessed from [address] and [address]. When you arrive, please check in at the main information desk of the hospital.    Please call Outpatient Surgery at [phone] after 12:00pm on the day before your procedure for your arrival time and updated procedure time.      Pre-Op Bathing Instructions    Before surgery, you can play an important role in your own health.    Because skin is not sterile, we need to be sure that your skin is as free of germs as possible. By following the instructions below, you can reduce the number of germs on your skin before surgery.    IMPORTANT: You will need to shower with a special soap called Hibiclens*, available at any pharmacy.  If you are allergic to Chlorhexidine (the antiseptic in Hibiclens), use an antibacterial soap such as Dial Soap for your preoperative shower.  You will shower with Hibiclens both the night before your surgery and the morning of your surgery.  Do not use Hibiclens on the head, face or genitals to avoid injury to those areas.    STEP #1: THE NIGHT BEFORE YOUR SURGERY     1. Do not shave the area  of your body where your surgery will be performed.  2. Shower and wash your hair and body as usual with your normal soap and shampoo.  3. Rinse your hair and body thoroughly after you shower to remove all soap residue.  4. With your hand, apply one packet of Hibiclens soap to the surgical site.   5. Wash the site gently for five (5) minutes. Do not scrub your skin too hard.   6. Do not wash with your regular soap after Hibiclens is used.  7. Rinse your body thoroughly.  8. Pat yourself dry with a clean, soft towel.  9. Do not use lotion, cream, or powder.  10. Wear clean clothes.    STEP #2: THE MORNING OF YOUR SURGERY     1. Repeat Step #1.    * Not to be used by people allergic to Chlorhexidine.

## 2019-06-14 ENCOUNTER — PATIENT MESSAGE (OUTPATIENT)
Dept: NEUROLOGY | Facility: HOSPITAL | Age: 44
End: 2019-06-14

## 2019-07-03 ENCOUNTER — PATIENT MESSAGE (OUTPATIENT)
Dept: NEUROLOGY | Facility: HOSPITAL | Age: 44
End: 2019-07-03

## 2019-07-08 ENCOUNTER — HOSPITAL ENCOUNTER (OUTPATIENT)
Dept: RADIOLOGY | Facility: HOSPITAL | Age: 44
Discharge: HOME | End: 2019-07-08
Attending: SURGERY
Payer: OTHER GOVERNMENT

## 2019-07-08 DIAGNOSIS — C7A.012 MALIGNANT CARCINOID TUMOR OF ILEUM: ICD-10-CM

## 2019-07-08 DIAGNOSIS — R10.84 GENERALIZED ABDOMINAL PAIN: ICD-10-CM

## 2019-07-08 PROCEDURE — 71260 CT CHEST ABDOMEN WITH CONTRAST (XPD): ICD-10-PCS | Mod: 26,,, | Performed by: RADIOLOGY

## 2019-07-08 PROCEDURE — 25500020 PHARM REV CODE 255: Performed by: SURGERY

## 2019-07-08 PROCEDURE — 74160 CT ABDOMEN W/CONTRAST: CPT | Mod: 26,,, | Performed by: RADIOLOGY

## 2019-07-08 PROCEDURE — 71260 CT THORAX DX C+: CPT | Mod: TC

## 2019-07-08 PROCEDURE — 74160 CT ABDOMEN W/CONTRAST: CPT | Mod: TC

## 2019-07-08 PROCEDURE — 74160 CT CHEST ABDOMEN WITH CONTRAST (XPD): ICD-10-PCS | Mod: 26,,, | Performed by: RADIOLOGY

## 2019-07-08 PROCEDURE — 71260 CT THORAX DX C+: CPT | Mod: 26,,, | Performed by: RADIOLOGY

## 2019-07-08 RX ADMIN — IOHEXOL 30 ML: 350 INJECTION, SOLUTION INTRAVENOUS at 12:07

## 2019-07-08 RX ADMIN — IOHEXOL 100 ML: 350 INJECTION, SOLUTION INTRAVENOUS at 01:07

## 2019-07-09 ENCOUNTER — TELEPHONE (OUTPATIENT)
Dept: INFECTIOUS DISEASES | Facility: CLINIC | Age: 44
End: 2019-07-09

## 2019-07-09 NOTE — TELEPHONE ENCOUNTER
Patient cxl'd appt that was scheduled, and informed he will call back and schedule appt after he recovers from upcoming surgery.

## 2019-07-15 ENCOUNTER — CLINICAL SUPPORT (OUTPATIENT)
Dept: LAB | Facility: HOSPITAL | Age: 44
DRG: 330 | End: 2019-07-15
Attending: SURGERY
Payer: COMMERCIAL

## 2019-07-15 ENCOUNTER — OFFICE VISIT (OUTPATIENT)
Dept: NEUROLOGY | Facility: HOSPITAL | Age: 44
DRG: 330 | End: 2019-07-15
Attending: SURGERY
Payer: COMMERCIAL

## 2019-07-15 ENCOUNTER — HOSPITAL ENCOUNTER (OUTPATIENT)
Dept: PREADMISSION TESTING | Facility: HOSPITAL | Age: 44
Discharge: HOME | DRG: 330 | End: 2019-07-15
Attending: SURGERY
Payer: COMMERCIAL

## 2019-07-15 ENCOUNTER — ANESTHESIA EVENT (OUTPATIENT)
Dept: SURGERY | Facility: HOSPITAL | Age: 44
DRG: 330 | End: 2019-07-15
Payer: COMMERCIAL

## 2019-07-15 ENCOUNTER — HOSPITAL ENCOUNTER (OUTPATIENT)
Dept: RADIOLOGY | Facility: HOSPITAL | Age: 44
Discharge: HOME | DRG: 330 | End: 2019-07-15
Attending: SURGERY
Payer: COMMERCIAL

## 2019-07-15 VITALS
TEMPERATURE: 99 F | DIASTOLIC BLOOD PRESSURE: 77 MMHG | WEIGHT: 251.56 LBS | SYSTOLIC BLOOD PRESSURE: 123 MMHG | BODY MASS INDEX: 37.26 KG/M2 | HEART RATE: 69 BPM | HEIGHT: 69 IN

## 2019-07-15 DIAGNOSIS — D3A.00 CARCINOID TUMOR: ICD-10-CM

## 2019-07-15 DIAGNOSIS — D3A.012 CARCINOID TUMOR OF ILEUM: ICD-10-CM

## 2019-07-15 DIAGNOSIS — D3A.00 CARCINOID TUMOR: Primary | ICD-10-CM

## 2019-07-15 DIAGNOSIS — C7A.012 MALIGNANT CARCINOID TUMOR OF ILEUM: Primary | ICD-10-CM

## 2019-07-15 PROCEDURE — 99214 OFFICE O/P EST MOD 30 MIN: CPT | Mod: 25 | Performed by: SURGERY

## 2019-07-15 PROCEDURE — 71046 X-RAY EXAM CHEST 2 VIEWS: CPT | Mod: 26,,, | Performed by: RADIOLOGY

## 2019-07-15 PROCEDURE — 71046 XR CHEST PA AND LATERAL: ICD-10-PCS | Mod: 26,,, | Performed by: RADIOLOGY

## 2019-07-15 PROCEDURE — 71046 X-RAY EXAM CHEST 2 VIEWS: CPT | Mod: TC,FY

## 2019-07-15 PROCEDURE — 93005 ELECTROCARDIOGRAM TRACING: CPT

## 2019-07-15 PROCEDURE — 93010 EKG 12-LEAD: ICD-10-PCS | Mod: ,,, | Performed by: INTERNAL MEDICINE

## 2019-07-15 PROCEDURE — 93010 ELECTROCARDIOGRAM REPORT: CPT | Mod: ,,, | Performed by: INTERNAL MEDICINE

## 2019-07-15 RX ORDER — PREGABALIN 75 MG/1
75 CAPSULE ORAL
Status: CANCELLED | OUTPATIENT
Start: 2019-07-15

## 2019-07-15 RX ORDER — ACETAMINOPHEN 500 MG
1000 TABLET ORAL
Status: CANCELLED | OUTPATIENT
Start: 2019-07-15 | End: 2019-07-15

## 2019-07-15 RX ORDER — LIDOCAINE HYDROCHLORIDE 10 MG/ML
1 INJECTION, SOLUTION EPIDURAL; INFILTRATION; INTRACAUDAL; PERINEURAL ONCE
Status: CANCELLED | OUTPATIENT
Start: 2019-07-15 | End: 2019-07-15

## 2019-07-15 RX ORDER — ENOXAPARIN SODIUM 100 MG/ML
30 INJECTION SUBCUTANEOUS
Status: CANCELLED | OUTPATIENT
Start: 2019-07-15

## 2019-07-15 RX ORDER — KETOROLAC TROMETHAMINE 15 MG/ML
15 INJECTION, SOLUTION INTRAMUSCULAR; INTRAVENOUS
Status: CANCELLED | OUTPATIENT
Start: 2019-07-15 | End: 2019-07-18

## 2019-07-15 RX ORDER — SODIUM CHLORIDE, SODIUM LACTATE, POTASSIUM CHLORIDE, CALCIUM CHLORIDE 600; 310; 30; 20 MG/100ML; MG/100ML; MG/100ML; MG/100ML
INJECTION, SOLUTION INTRAVENOUS CONTINUOUS
Status: CANCELLED | OUTPATIENT
Start: 2019-07-15

## 2019-07-15 RX ORDER — FAMOTIDINE 10 MG/ML
20 INJECTION INTRAVENOUS
Status: CANCELLED | OUTPATIENT
Start: 2019-07-15

## 2019-07-15 RX ORDER — ONDANSETRON 2 MG/ML
8 INJECTION INTRAMUSCULAR; INTRAVENOUS
Status: CANCELLED | OUTPATIENT
Start: 2019-07-15

## 2019-07-15 NOTE — DISCHARGE INSTRUCTIONS
Your surgery is scheduled for 7/16    Please report to Outpatient Surgery Intake Office on the 2nd FLOOR at 0530 a.m.          INSTRUCTIONS IMPORTANT!!!  ¨ Do not eat or drink after 12 midnight-including water. OK to brush teeth, no   gum, candy or mints!    ¨ Take only these medicines with a small swallow of water-morning of surgery.        ____  Proceed to [facility] for additional blood test.        ____  Do not wear makeup, including mascara.  ____  No powder, lotions or creams to surgical area.  ____  Please remove all jewelry, including piercings and leave at home.  ____  No money or valuables needed. Please leave at home.  ____  Please bring any documents given by your doctor.  ____  If going home the same day, arrange for a ride home. You will not be able to             drive if Anesthesia was used.  ____  Children under 18 years require a parent / guardian present the entire time             they are in surgery / recovery.  ____  Wear loose fitting clothing. Allow for dressings, bandages.  ____  Stop Aspirin, Ibuprofen, Motrin and Aleve at least 3-5 days before surgery, unless otherwise instructed by your doctor, or the nurse.   You MAY use Tylenol/acetaminophen until day of surgery.  ____  Wash the surgical area with Hibiclens the night before surgery, and again the             morning of surgery.  Be sure to rinse hibiclens off completely (if instructed by   nurse).  ____  If you take diabetic medication, do not take am of surgery unless instructed by Doctor.  ____  Call MD for temperature above 101 degrees or any other signs of infection such as Urinary (bladder) infection, Upper respiratory infection, skin boils, etc.  ____ Stop taking any Fish Oil supplement or any Vitamins that contain Vitamin E at least 5 days prior to surgery.  ____ Do Not wear your contact lenses the day of your procedure.  You may wear your glasses.      ____Do not shave surgical site for 3 days prior to  surgery.  ____ Practice Good hand washing before, during, and after procedure.      I have read or had read and explained to me, and understand the above information.  Additional comments or instructions:  For additional questions call [phone]      ANESTHESIA SIDE EFFECTS  -For the first 24 hours after surgery:  Do not drive, use heavy equipment, make important decisions, or drink alcohol  -It is normal to feel sleepy for several hours.  Rest until you are more awake.  -Have someone stay with you, if needed.  They can watch for problems and help keep you safe.  -Some possible post anesthesia side effects include: nausea and vomiting, sore throat and hoarseness, sleepiness, and dizziness.        Pre-Op Bathing Instructions    Before surgery, you can play an important role in your own health.    Because skin is not sterile, we need to be sure that your skin is as free of germs as possible. By following the instructions below, you can reduce the number of germs on your skin before surgery.    IMPORTANT: You will need to shower with a special soap called Hibiclens*, available at any pharmacy.  If you are allergic to Chlorhexidine (the antiseptic in Hibiclens), use an antibacterial soap such as Dial Soap for your preoperative shower.  You will shower with Hibiclens both the night before your surgery and the morning of your surgery.  Do not use Hibiclens on the head, face or genitals to avoid injury to those areas.    STEP #1: THE NIGHT BEFORE YOUR SURGERY     1. Do not shave the area of your body where your surgery will be performed.  2. Shower and wash your hair and body as usual with your normal soap and shampoo.  3. Rinse your hair and body thoroughly after you shower to remove all soap residue.  4. With your hand, apply one packet of Hibiclens soap to the surgical site.   5. Wash the site gently for five (5) minutes. Do not scrub your skin too hard.   6. Do not wash with your regular soap after Hibiclens is  used.  7. Rinse your body thoroughly.  8. Pat yourself dry with a clean, soft towel.  9. Do not use lotion, cream, or powder.  10. Wear clean clothes.    STEP #2: THE MORNING OF YOUR SURGERY     1. Repeat Step #1.    * Not to be used by people allergic to Chlorhexidine.        Exploratory Laparotomy     This is an example of the kind of incision the surgeon may use to perform exploratory laparotomy.   Exploratory laparotomy is surgery to open up the belly area (abdomen). This surgery is done to find the cause of problems (such as belly pain or bleeding) that testing could not diagnose. It is also used when an abdominal injury needs emergency medical care. This surgery uses one large cut (incision). The provider can then see and check the organs inside the abdomen. If the cause of the problem is found during the procedure, then treatment is often done at the same time. In some cases, a minimally invasive surgery called exploratory laparoscopy may be used instead. That method uses a tiny camera and several small incisions. But in many cases an exploratory laparotomy is preferred. Read on to learn more about this procedure.  Reasons for the surgery  Organs that may be looked at during exploratory laparotomy include:  · Liver  · Gallbladder  · Spleen  · Pancreas  · Kidneys  · Stomach  · Small intestine (small bowel)  · Large intestine (colon or large bowel)  · Appendix  · Ovaries, fallopian tubes, and uterus (in women)  · Lymph nodes   · Abdominal blood vessels   · Membranes that line the abdominal cavity  Getting ready for the surgery  The surgery takes place in a hospital. It is done by a surgeon. You will likely stay in the hospital for a few days or longer. To prepare for the surgery, do the following:  · Tell your provider about any medicines youre taking. This includes over-the-counter medicines, prescription medicines, herbs, street drugs, herbs, vitamins, and other supplements. You may need to stop taking some  or all of them for a time before the surgery.  · Tell your provider if you drink alcohol. This is very important if you are a heavy drinker. Alcohol withdrawal can be life-threatening, so be honest with your provider.  · Also tell your provider if you have any allergies or other health problems. This includes recent illnesses, especially any bleeding problems.  · Follow any directions you are given for not eating or drinking before surgery.  The day of the surgery  · Most exploratory laparotomies are done as emergency surgery after an injury or accident.   · You will be checked for risks of heart, lung, or other problems during surgery.   · You may need to change into a hospital gown.  · Before the surgery begins, an IV (intravenous) line is put into a vein in your arm or hand. This line supplies fluids and medicines.  · You will be given medicine (general anesthesia) to keep you free of pain. This medicine puts you in a state like deep sleep during the surgery.   · A tube may be placed through your mouth and into your throat to help with breathing. Also, monitors are attached to your body. These record your vital signs, such as heart rate and blood pressure, during the surgery.   · A thin tube (catheter) is placed into your bladder. This tube drains urine from your bladder during the surgery.  During the surgery  · The skin over your belly is cleaned.  · An incision is made in your belly.  · The tissue, blood vessels, and organs in your belly are carefully looked at and checked for problems.  · Tissue samples (biopsy) may be removed and sent to a lab for study.  · If the cause of the problem is found, treatment may be done then, if needed.  · When the surgery is done, the incision is closed with stitches (sutures) or staples. A drain may be placed in the abdomen to remove any extra fluids.  After the surgery  · You will be taken to a room to rest until you have recovered from the anesthesia. Nurses will closely  watch your condition. When you are more awake and alert, you will be moved to another room.  · Medicines are given to help prevent infection and to manage pain, if needed.  · You will not be given food or drink until your bowels start to work normally again. This may take a few days.  · You will need to get up and walk around as soon as you are able. This helps to prevent blood clots.  · Also, you may be given breathing exercises to do. These help prevent pneumonia.  · The tube to drain urine is usually removed within a few days.  · If a drain was used for your incision, this is also removed.  · You will be able to go home when the provider says there are no issues of concern.  · Arrange for an adult family member or friend to drive you home.  · Before leaving, make sure you have all the prescriptions and home care instructions you will need. Also make sure you have a contact number for your provider or the hospital. This is in case you have problems or questions after the surgery.  · Do not lift anything heavier than 5 pounds for about 6 weeks. This gives tissue time to heal, and can prevent a hernia.  When to call your provider  After you get home, call your healthcare provider if you have:  · Fever of 100.4°F (38.0°C) or higher, or as advised by your provider  · Increased pain, redness, swelling, bleeding, or drainage at the incision site  · Pain that cannot be controlled with medicines  · Swollen belly  · Diarrhea or constipation that does not get better within 2 days  · Bloody or black, tarry stools  · Problems or pain with urination  · Chest pain, shortness of breath, or cough that wont go away  · Nausea and vomiting  · Dizziness or fainting  · Swelling or pain in the leg   Follow-up  Recovery time will vary for each person. It may take as long as 4 to 6 weeks. You will need to see your provider for follow-up. This is to remove any sutures or staples and to check your healing progress.  Risks and possible  complications  These vary depending on the reason for the surgery. The most common risks and possible complications include:  · Bleeding  · Infection  · Can't find the cause of the problem, so more surgery or other treatments may be needed  · Incision doesn't heal well  · Damage, injury, or problems with the bowels  · Risks of anesthesia   Date Last Reviewed: 7/1/2016 © 2000-2017 "[employer]. [address] [place] [address]. All rights reserved. This information is not intended as a substitute for professional medical care. Always follow your healthcare professional's instructions.        Anesthesia: General Anesthesia     You are watched continuously during your procedure by your anesthesia provider.     Youre due to have surgery. During surgery, youll be given medicine called anesthesia or anesthetic. This will keep you comfortable and pain-free. Your anesthesia provider will use general anesthesia.  What is general anesthesia?  General anesthesia puts you into a state like deep sleep. It goes into the bloodstream (IV anesthetics), into the lungs (gas anesthetics), or both. You feel nothing during the procedure. You will not remember it. During the procedure, the anesthesia provider monitors you continuously. He or she checks your heart rate and rhythm, blood pressure, breathing, and blood oxygen.  · IV anesthetics. IV anesthetics are given through an IV line in your arm. Theyre often given first. This is so you are asleep before a gas anesthetic is started. Some kinds of IV anesthetics relieve pain. Others relax you. Your doctor will decide which kind is best in your case.  · Gas anesthetics. Gas anesthetics are breathed into the lungs. They are often used to keep you asleep. They can be given through a facemask or a tube placed in your larynx or trachea (breathing tube).  ¨ If you have a facemask, your anesthesia provider will most likely place it over your nose and mouth while youre  still awake. Youll breathe oxygen through the mask as your IV anesthetic is started. Gas anesthetic may be added through the mask.  ¨ If you have a tube in the larynx or trachea, it will be inserted into your throat after youre asleep.  Anesthesia tools and medicines  You will likely have:  · IV anesthetics. These are put into an IV line into your bloodstream.  · Gas anesthetics. You breathe these anesthetics into your lungs, where they pass into your bloodstream.  · Pulse oximeter. This is a small clip that is attached to the end of your finger. This measures your blood oxygen level.  · Electrocardiography leads (electrodes). These are small sticky pads that are placed on your chest. They record your heart rate and rhythm.  · Blood pressure cuff. This reads your blood pressure.  Risks and possible complications  General anesthesia has some risks. These include:  · Breathing problems  · Nausea and vomiting  · Sore throat or hoarseness (usually temporary)  · Allergic reaction to the anesthetic  · Irregular heartbeat (rare)  · Cardiac arrest (rare)   Anesthesia safety  · Follow all instructions you are given for how long not to eat or drink before your procedure.  · Be sure your doctor knows what medicines and drugs you take. This includes over-the-counter medicines, herbs, supplements, alcohol or other drugs. You will be asked when those were last taken.  · Have an adult family member or friend drive you home after the procedure.  · For the first 24 hours after your surgery:  ¨ Do not drive or use heavy equipment.  ¨ Do not make important decisions or sign legal documents. If important decisions or signing legal documents is necessary during the first 24 hours after surgery, have a trusted family member or spouse act on your behalf.  ¨ Avoid alcohol.  ¨ Have a responsible adult stay with you. He or she can watch for problems and help keep you safe.  Date Last Reviewed: 12/1/2016  © 9222-5728 [employer] [address] [place] [address]. All rights reserved. This information is not intended as a substitute for professional medical care. Always follow your healthcare professional's instructions.

## 2019-07-15 NOTE — PATIENT INSTRUCTIONS
Patient Instructions    Name: ___________________          Take a Hibiclens shower twice a day for 3 days prior to surgery, including the morning of surgery.   Gargle with Listerine twice a day for 2 days prior to surgery, including the morning of surgery.      _________________     Appointment with(today at 1130am)   Pre [place] for Hospital Admission - Go to 1st floor of the hospital-admitting desk. You will do paper work, get blood drawn,  get x-rays and see Anesthesia at this time.     _________________   CLEAR LIQUIDS all day   Start bowel prep  Ducolax Laxative tablets - 2 tablets at 12 noon  Magnesium Citrate - 1 bottle at 2 pm  Neomycin-Take 2 tablets at 1pm, 3pm and 10pm  Erythromycin-Take 2 tablets at 1pm, 3pm and 10pm.   Do not eat or drink anything after midnight.                 __________________   Hospital Admission for surgery.  Report to 2nd floor, Same Day Surgery desk at ________   Surgery is scheduled for _________        [facility] [place] [address] [place]  [address]  [phone]      INFORMATION REGARDING YOUR PROCEDURE      We look forward to serving you and your family and appreciate that you have chosen [facility] for your healthcare needs. Before, during, and after your surgery, you will be cared for by skilled medical professionals. Our surgeons, anesthesiologists, nurses,  and other healthcare professionals will work with you and your family to ensure a safe, smooth and comfortable surgery and recovery.    In order to best meet your pre-admission needs, your surgeon or ordering physicians office will contact our Scheduling Office at [phone] and schedule a Pre-Procedure Appointment.  This should preferably be done 72 hours or greater before your scheduled procedure date.     During your pre-procedure visit your insurance will be verified for your procedure. You will meet with a Registered Nurse and an Anesthesiologist or Nurse  Anesthetist. If tests are required, they will be performed during your visit. Please allow about one and a half hours for this visit.      You will need to bring the following information or items with you to your Pre-Procedure Appointment:    1.  Picture Identification  2.  Insurance Card  3.  Current list of medications to include name and dosage  4.  List of allergies  5.  Orders and any other forms your doctor has given to you  6.  Copies of lab results performed at other facilities. This may include blood work, EKGs or chest xrays.   These results can be faxed to [phone].    The Pre-Op Center Registration Desk is located on the first floor of the hospital ([address] [place] [address]) in the [facility].  The Pre-Operative Center is located on the second floor of the hospital. Someone will walk you from the registration area to the Pre-Operative Center.    Free parking is located in the front of the hospital and medical office building and is easily accessed from [facility] and [facility]. When you arrive, please check in at the main information desk of the hospital.    Please call Outpatient Surgery at [phone] after 12:00pm on the day before your procedure for your arrival time and updated procedure time.      Pre-Op Bathing Instructions    Before surgery, you can play an important role in your own health.    Because skin is not sterile, we need to be sure that your skin is as free of germs as possible. By following the instructions below, you can reduce the number of germs on your skin before surgery.    IMPORTANT: You will need to shower with a special soap called Hibiclens*, available at any pharmacy.  If you are allergic to Chlorhexidine (the antiseptic in Hibiclens), use an antibacterial soap such as Dial Soap for your preoperative shower.  You will shower with Hibiclens both the night before your surgery and the morning of your surgery.  Do not use Hibiclens on the head, face or  genitals to avoid injury to those areas.    STEP #1: THE NIGHT BEFORE YOUR SURGERY     1. Do not shave the area of your body where your surgery will be performed.  2. Shower and wash your hair and body as usual with your normal soap and shampoo.  3. Rinse your hair and body thoroughly after you shower to remove all soap residue.  4. With your hand, apply one packet of Hibiclens soap to the surgical site.   5. Wash the site gently for five (5) minutes. Do not scrub your skin too hard.   6. Do not wash with your regular soap after Hibiclens is used.  7. Rinse your body thoroughly.  8. Pat yourself dry with a clean, soft towel.  9. Do not use lotion, cream, or powder.  10. Wear clean clothes.    STEP #2: THE MORNING OF YOUR SURGERY     1. Repeat Step #1.    * Not to be used by people allergic to Chlorhexidine.

## 2019-07-15 NOTE — H&P (VIEW-ONLY)
"NOLANETS:  [facility]  A collaboration between [facility] and [facility]      PATIENT: Aubrey Gallagher  MRN: [id number]  DATE: 7/15/2019    Subjective:      Chief Complaint: Follow-up (follow up)    Doing ok overall no more episode of abdominal pain no nausea or vomiting  Feeling ok  He was previously diagnosed with neuroendocrine tumor when he presented to the [facility] with abdominal pain. He underwent CT scan which showed a mesenteric mass.  Further workup revealed neuroendocrine tumor.  He has a gallium scan that is positive at the primary site as well as at the mesenteric base.  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  He was seen by ID who recommended prophylactic treatment.  When he saw me I ordered for TB gold  Underwent TB gold test came back negative    Vitals:   Vitals:    07/15/19 1030   BP: 123/77   BP Location: Right arm   Patient Position: Sitting   BP Method: Large (Automatic)   Pulse: 69   Temp: 98.5 °F (36.9 °C)   TempSrc: Oral   Weight: 114.1 kg (251 lb 8.7 oz)   Height: 5' 9" (1.753 m)        Karnofsky Score:     Diagnosis: No diagnosis found.     Oncologic History:     Interval History:     Past Medical History:  Past Medical History:   Diagnosis Date    Mesenteric mass        Past Surgical History:  History reviewed. No pertinent surgical history.    Family History:  History reviewed. No pertinent family history.    Allergies:  Review of patient's allergies indicates:   Allergen Reactions    Epinephrine      Neuroendocrine Tumor patient      Ciprofloxacin Other (See Comments)     Cramping        Medications:  Current Outpatient Medications   Medication Sig Dispense Refill    bisacodyl (DULCOLAX) 5 mg EC tablet Take by mouth 2 TABLETS AT 12 NOON THE DAY PRIOR TO SURGERY 2 tablet 0    chlorhexidine (HIBICLENS) 4 % external liquid WASH SURGICAL AREA TWO TIMES A DAY UP TO 3 DAYS " PRIOR TO SURGERY AND MORNING OF SURGERY 118 mL 0    dicyclomine (BENTYL) 10 MG capsule Take 10 mg by mouth 4 (four) times daily before meals and nightly.      erythromycin base (E-MYCIN) 500 MG tablet TAKE 2 TABLETS (1000 MG TOTAL) BY MOUTH AT 1 PM, 3 PM, AND 10 PM THE DAY PRIOR TO SURGERY 6 tablet 0    Listerine Liqd SWISH AND SPIT BY MOUTH TWO TIMES A DAY UP TO 2 DAYS PRIOR TO SURGERY 95 mL 0    magnesium citrate solution DRINK ENTIRE BOTTLE BY MOUTH AT 2 PM THE DAY PRIOR TO SURGERY 296 mL 0    metoclopramide HCl (REGLAN) 10 MG tablet Take 10 mg by mouth 4 (four) times daily.      neomycin (MYCIFRADIN) 500 mg Tab TAKE 2 TABLETS (1000 MG TOTAL) BY MOUTH AT 1 PM, 3 PM, AND 10 PM THE DAY PRIOR TO SURGERY 6 tablet 0    ondansetron (ZOFRAN-ODT) 4 MG TbDL Take 8 mg by mouth every 12 (twelve) hours.       No current facility-administered medications for this visit.        Review of Systems   Constitutional: Negative for activity change, appetite change, chills, diaphoresis, fatigue, fever and unexpected weight change.   HENT: Negative for dental problem, drooling, ear discharge, ear pain, facial swelling, hearing loss, mouth sores, nosebleeds, postnasal drip, rhinorrhea, sneezing, sore throat and tinnitus.    Eyes: Negative for photophobia, pain, redness and visual disturbance.   Respiratory: Negative for apnea, cough, choking, chest tightness, shortness of breath and stridor.    Cardiovascular: Negative for chest pain and leg swelling.   Gastrointestinal: Negative for abdominal pain, anal bleeding, diarrhea, nausea, rectal pain and vomiting.   Endocrine: Negative.    Genitourinary: Negative.    Skin: Negative for color change, rash and wound.   Allergic/Immunologic: Negative.    Neurological: Negative for seizures, syncope, facial asymmetry, speech difficulty, weakness, light-headedness and headaches.   Hematological: Negative.    Psychiatric/Behavioral: Negative for agitation, confusion and decreased  concentration.      Objective:      Physical Exam   Constitutional: He is oriented to person, place, and time. He appears well-developed and well-nourished. No distress.   HENT:   Head: Normocephalic and atraumatic.   Right Ear: External ear normal.   Left Ear: External ear normal.   Eyes: Pupils are equal, round, and reactive to light. Conjunctivae and EOM are normal.   Neck: Normal range of motion.   Cardiovascular: Normal rate and regular rhythm.   Pulmonary/Chest: Effort normal and breath sounds normal.   Abdominal: Soft. Bowel sounds are normal. He exhibits no distension and no mass. There is no tenderness. There is no rebound and no guarding. No hernia.   Musculoskeletal: Normal range of motion.   Neurological: He is alert and oriented to person, place, and time.   Skin: Skin is warm. He is not diaphoretic.   Psychiatric: He has a normal mood and affect. His behavior is normal.      Assessment:       No diagnosis found.    Laboratory Data:   Images for ViTAL [place] Viewer (Includes [facility] and [place] Images)      Show images for NM PET Ga68 Dotatate Whole Body   All Reviewers List     Litzy Garcia RN on 3/14/2019 17:00   Kavitha Carlos MD on 3/14/2019 14:57   NM PET Ga68 Dotatate Whole Body   Order: [id number]   Status:  Final result   Visible to patient:  Yes (Patient Portal) Next appt:  Today at 11:30 AM in Pre-Admission Testing (PRE-ADMIT ONE, [facility]) Dx:  Malignant carcinoid tumor of unknown ...   Details     Reading Physician Reading Date Result Priority   Hernan Mendiola MD 3/14/2019    Emanuel Palomino MD 3/14/2019       Narrative     EXAMINATION:  NM PET 68GA DOTATATE WHOLE BODY    CLINICAL HISTORY:  Malignant carcinoid tumor of unspecified site    TECHNIQUE:  5.2 mCi of GA68 Dotatate was administered intravenously in the right antecubital fossa. After an approximately 60 min distribution time, PET/CT images were acquired from the skull vertex to the mid  thigh. Transmission images were acquired to correct for attenuation using a whole body low-dose CT scan without contrast with the arms positioned above the head.    COMPARISON:  Outside hospital CT 02/20/2019.    FINDINGS:  Quality of the study: Adequate.    There is a 3.6 cm mesenteric mass demonstrating SUV max of 40.6. No other abnormal foci of increased tracer uptake are present.    Physiologic uptake of the tracer is seen within the pituitary, liver, spleen, kidneys, adrenal glands and bowel.    Incidental CT findings: Heart is enlarged with trace pericardial fluid.  Rectum is filled with stool.  Appendix is normal.      Impression       FDG-avid mesenteric mass concerning for carcinoid.  No evidence of metastasis.    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  He does not have any metastatic disease on MRI or gallium scan.  The plan would be explore him check his pelvis check the diaphragm checked the liver with ultrasound.  He will undergo small bowel resection with or without colon, lymphadenectomy, cholecystectomy and possible liver resection.  I talked about the rationale of gallbladder surgery. I talked about the risk of surgery such as bleeding infection DVT PE MI stroke anastomotic leaks requiring redo surgery renal surgery for bleeding possibly of diarrhea after the surgery ileus after surgery.    I talked about the hospitalization time of about 5-7 days.  Recovery time of about 3-6 weeks time.  And that I also emphasized the importance of long-term follow-up as this is a slow growing tumor which can have a recurrence in a delayed fashion.  He was accompanied by his wife.  I answered all the questions  Plan:       NPO after midnight  Can have clear liquids now  Bowel prep on oral antibiotics prescribed  For ex  lap tomorrow  Following admission I will consult Infectious Disease and go over all the workup and investigations to see whether he would require prophylactic anti TB treatment    Benefits and risk explained consent obtained spent more than 30 min in explaining and and explaining long-term risks and benefits                  MILENA Plummer MD, FACS   Associate Professor of Surgery, [employer]   Neuroendocrine Surgery, Hepatic/Pancreatic & General Surgery   [address], [address]   [place] [address]   ph. [phone]; [phone]   fax. [phone]

## 2019-07-15 NOTE — ANESTHESIA PREPROCEDURE EVALUATION
07/15/2019  Aubrey Gallagher is a 44 y.o., male with carcinoid scheduled for liver resection, cholecystectomy, and small bowel resection on 7/16/19.    Anesthesia Evaluation      I have reviewed the Medications.     Review of Systems  Anesthesia Hx:  No problems with previous Anesthesia Denies Family Hx of Anesthesia complications.    Social:  Non-Smoker, Social Alcohol Use    Hematology/Oncology:  Hematology Normal      Current/Recent Cancer. (Carcinoid)   Cardiovascular:  Cardiovascular Normal Exercise tolerance: good   Denies Angina.        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)    Neurological:  Neurology Normal    Endocrine:  Endocrine Normal        Physical Exam  General:  Obesity    Airway/Jaw/Neck:  Airway Findings: Mouth Opening: Normal Tongue: Normal  General Airway Assessment: Adult  Mallampati: III  TM Distance: Normal, at least 6 cm       Chest/Lungs:  Chest/Lungs Findings: Clear to auscultation, Normal Respiratory Rate     Heart/Vascular:  Heart Findings: Rate: Normal  Rhythm: Regular Rhythm  Sounds: Normal  Heart murmur: negative       Mental Status:  Mental Status Findings:  Cooperative, Alert and Oriented         Anesthesia Plan  Type of Anesthesia, risks & benefits discussed:  Anesthesia Type:  general  Patient's Preference:   Intra-op Monitoring Plan:   Intra-op Monitoring Plan Comments:   Post Op Pain Control Plan:   Post Op Pain Control Plan Comments:   Induction:   IV  Beta Blocker:         Informed Consent: Patient understands risks and agrees with Anesthesia plan.  Questions answered.   ASA Score: 3     Day of Surgery Review of History & Physical: I have interviewed and examined the patient. I have reviewed the patient's H&P dated:        Anesthesia Plan Notes: Anesthesia consent  to be signed prior to procedure on 7/16/19          Ready For Surgery From Anesthesia Perspective.

## 2019-07-15 NOTE — PROGRESS NOTES
"NOLANETS:  [facility]  A collaboration between [facility] and [facility]      PATIENT: Aubrey Gallagher  MRN: [id number]  DATE: 7/15/2019    Subjective:      Chief Complaint: Follow-up (follow up)    Doing ok overall no more episode of abdominal pain no nausea or vomiting  Feeling ok  He was previously diagnosed with neuroendocrine tumor when he presented to the [facility] with abdominal pain. He underwent CT scan which showed a mesenteric mass.  Further workup revealed neuroendocrine tumor.  He has a gallium scan that is positive at the primary site as well as at the mesenteric base.  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  He was seen by ID who recommended prophylactic treatment.  When he saw me I ordered for TB gold  Underwent TB gold test came back negative    Vitals:   Vitals:    07/15/19 1030   BP: 123/77   BP Location: Right arm   Patient Position: Sitting   BP Method: Large (Automatic)   Pulse: 69   Temp: 98.5 °F (36.9 °C)   TempSrc: Oral   Weight: 114.1 kg (251 lb 8.7 oz)   Height: 5' 9" (1.753 m)        Karnofsky Score:     Diagnosis: No diagnosis found.     Oncologic History:     Interval History:     Past Medical History:  Past Medical History:   Diagnosis Date    Mesenteric mass        Past Surgical History:  History reviewed. No pertinent surgical history.    Family History:  History reviewed. No pertinent family history.    Allergies:  Review of patient's allergies indicates:   Allergen Reactions    Epinephrine      Neuroendocrine Tumor patient      Ciprofloxacin Other (See Comments)     Cramping        Medications:  Current Outpatient Medications   Medication Sig Dispense Refill    bisacodyl (DULCOLAX) 5 mg EC tablet Take by mouth 2 TABLETS AT 12 NOON THE DAY PRIOR TO SURGERY 2 tablet 0    chlorhexidine (HIBICLENS) 4 % external liquid WASH SURGICAL AREA TWO TIMES A DAY UP TO 3 DAYS " PRIOR TO SURGERY AND MORNING OF SURGERY 118 mL 0    dicyclomine (BENTYL) 10 MG capsule Take 10 mg by mouth 4 (four) times daily before meals and nightly.      erythromycin base (E-MYCIN) 500 MG tablet TAKE 2 TABLETS (1000 MG TOTAL) BY MOUTH AT 1 PM, 3 PM, AND 10 PM THE DAY PRIOR TO SURGERY 6 tablet 0    Listerine Liqd SWISH AND SPIT BY MOUTH TWO TIMES A DAY UP TO 2 DAYS PRIOR TO SURGERY 95 mL 0    magnesium citrate solution DRINK ENTIRE BOTTLE BY MOUTH AT 2 PM THE DAY PRIOR TO SURGERY 296 mL 0    metoclopramide HCl (REGLAN) 10 MG tablet Take 10 mg by mouth 4 (four) times daily.      neomycin (MYCIFRADIN) 500 mg Tab TAKE 2 TABLETS (1000 MG TOTAL) BY MOUTH AT 1 PM, 3 PM, AND 10 PM THE DAY PRIOR TO SURGERY 6 tablet 0    ondansetron (ZOFRAN-ODT) 4 MG TbDL Take 8 mg by mouth every 12 (twelve) hours.       No current facility-administered medications for this visit.        Review of Systems   Constitutional: Negative for activity change, appetite change, chills, diaphoresis, fatigue, fever and unexpected weight change.   HENT: Negative for dental problem, drooling, ear discharge, ear pain, facial swelling, hearing loss, mouth sores, nosebleeds, postnasal drip, rhinorrhea, sneezing, sore throat and tinnitus.    Eyes: Negative for photophobia, pain, redness and visual disturbance.   Respiratory: Negative for apnea, cough, choking, chest tightness, shortness of breath and stridor.    Cardiovascular: Negative for chest pain and leg swelling.   Gastrointestinal: Negative for abdominal pain, anal bleeding, diarrhea, nausea, rectal pain and vomiting.   Endocrine: Negative.    Genitourinary: Negative.    Skin: Negative for color change, rash and wound.   Allergic/Immunologic: Negative.    Neurological: Negative for seizures, syncope, facial asymmetry, speech difficulty, weakness, light-headedness and headaches.   Hematological: Negative.    Psychiatric/Behavioral: Negative for agitation, confusion and decreased  concentration.      Objective:      Physical Exam   Constitutional: He is oriented to person, place, and time. He appears well-developed and well-nourished. No distress.   HENT:   Head: Normocephalic and atraumatic.   Right Ear: External ear normal.   Left Ear: External ear normal.   Eyes: Pupils are equal, round, and reactive to light. Conjunctivae and EOM are normal.   Neck: Normal range of motion.   Cardiovascular: Normal rate and regular rhythm.   Pulmonary/Chest: Effort normal and breath sounds normal.   Abdominal: Soft. Bowel sounds are normal. He exhibits no distension and no mass. There is no tenderness. There is no rebound and no guarding. No hernia.   Musculoskeletal: Normal range of motion.   Neurological: He is alert and oriented to person, place, and time.   Skin: Skin is warm. He is not diaphoretic.   Psychiatric: He has a normal mood and affect. His behavior is normal.      Assessment:       No diagnosis found.    Laboratory Data:   Images for ViTAL [place] Viewer (Includes [facility] and [place] Images)      Show images for NM PET Ga68 Dotatate Whole Body   All Reviewers List     Litzy Garcia RN on 3/14/2019 17:00   Kavitha Carlos MD on 3/14/2019 14:57   NM PET Ga68 Dotatate Whole Body   Order: [id number]   Status:  Final result   Visible to patient:  Yes (Patient Portal) Next appt:  Today at 11:30 AM in Pre-Admission Testing (PRE-ADMIT ONE, [facility]) Dx:  Malignant carcinoid tumor of unknown ...   Details     Reading Physician Reading Date Result Priority   Hernan Mendiola MD 3/14/2019    Emanuel Palomino MD 3/14/2019       Narrative     EXAMINATION:  NM PET 68GA DOTATATE WHOLE BODY    CLINICAL HISTORY:  Malignant carcinoid tumor of unspecified site    TECHNIQUE:  5.2 mCi of GA68 Dotatate was administered intravenously in the right antecubital fossa. After an approximately 60 min distribution time, PET/CT images were acquired from the skull vertex to the mid  thigh. Transmission images were acquired to correct for attenuation using a whole body low-dose CT scan without contrast with the arms positioned above the head.    COMPARISON:  Outside hospital CT 02/20/2019.    FINDINGS:  Quality of the study: Adequate.    There is a 3.6 cm mesenteric mass demonstrating SUV max of 40.6. No other abnormal foci of increased tracer uptake are present.    Physiologic uptake of the tracer is seen within the pituitary, liver, spleen, kidneys, adrenal glands and bowel.    Incidental CT findings: Heart is enlarged with trace pericardial fluid.  Rectum is filled with stool.  Appendix is normal.      Impression       FDG-avid mesenteric mass concerning for carcinoid.  No evidence of metastasis.    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  He does not have any metastatic disease on MRI or gallium scan.  The plan would be explore him check his pelvis check the diaphragm checked the liver with ultrasound.  He will undergo small bowel resection with or without colon, lymphadenectomy, cholecystectomy and possible liver resection.  I talked about the rationale of gallbladder surgery. I talked about the risk of surgery such as bleeding infection DVT PE MI stroke anastomotic leaks requiring redo surgery renal surgery for bleeding possibly of diarrhea after the surgery ileus after surgery.    I talked about the hospitalization time of about 5-7 days.  Recovery time of about 3-6 weeks time.  And that I also emphasized the importance of long-term follow-up as this is a slow growing tumor which can have a recurrence in a delayed fashion.  He was accompanied by his wife.  I answered all the questions  Plan:       NPO after midnight  Can have clear liquids now  Bowel prep on oral antibiotics prescribed  For ex  lap tomorrow  Following admission I will consult Infectious Disease and go over all the workup and investigations to see whether he would require prophylactic anti TB treatment    Benefits and risk explained consent obtained spent more than 30 min in explaining and and explaining long-term risks and benefits                  MILENA Plummer MD, FACS   Associate Professor of Surgery, [facility]   Neuroendocrine Surgery, Hepatic/Pancreatic & General Surgery   [address], [address]   [place] [address]   ph. [phone]; [phone]   fax. [phone]

## 2019-07-16 ENCOUNTER — HOSPITAL ENCOUNTER (INPATIENT)
Facility: HOSPITAL | Age: 44
LOS: 7 days | Discharge: HOME | DRG: 330 | End: 2019-07-23
Attending: SURGERY | Admitting: SURGERY
Payer: COMMERCIAL

## 2019-07-16 ENCOUNTER — ANESTHESIA (OUTPATIENT)
Dept: SURGERY | Facility: HOSPITAL | Age: 44
DRG: 330 | End: 2019-07-16
Payer: COMMERCIAL

## 2019-07-16 DIAGNOSIS — C7B.09 CARCINOID TUMOR METASTATIC TO INTRA-ABDOMINAL LYMPH NODE: ICD-10-CM

## 2019-07-16 DIAGNOSIS — D3A.012 CARCINOID TUMOR OF ILEUM: ICD-10-CM

## 2019-07-16 DIAGNOSIS — R76.11 POSITIVE PPD: ICD-10-CM

## 2019-07-16 DIAGNOSIS — K36 CHRONIC APPENDICITIS: ICD-10-CM

## 2019-07-16 DIAGNOSIS — C7A.00 CARCINOID TUMOR METASTATIC TO INTRA-ABDOMINAL LYMPH NODE: ICD-10-CM

## 2019-07-16 DIAGNOSIS — C7A.012 MALIGNANT CARCINOID TUMOR OF ILEUM: Primary | ICD-10-CM

## 2019-07-16 DIAGNOSIS — K80.10 CALCULUS OF GALLBLADDER WITH CHRONIC CHOLECYSTITIS WITHOUT OBSTRUCTION: ICD-10-CM

## 2019-07-16 LAB
ANION GAP SERPL CALC-SCNC: 10 MMOL/L (ref 8–16)
BASOPHILS # BLD AUTO: 0 K/UL (ref 0–0.2)
BASOPHILS NFR BLD: 0 % (ref 0–1.9)
BUN SERPL-MCNC: 11 MG/DL (ref 6–20)
CALCIUM SERPL-MCNC: 8.6 MG/DL (ref 8.7–10.5)
CHLORIDE SERPL-SCNC: 105 MMOL/L (ref 95–110)
CO2 SERPL-SCNC: 23 MMOL/L (ref 23–29)
CREAT SERPL-MCNC: 1 MG/DL (ref 0.5–1.4)
DIFFERENTIAL METHOD: ABNORMAL
EOSINOPHIL # BLD AUTO: 0 K/UL (ref 0–0.5)
EOSINOPHIL NFR BLD: 0 % (ref 0–8)
ERYTHROCYTE [DISTWIDTH] IN BLOOD BY AUTOMATED COUNT: 13.8 % (ref 11.5–14.5)
EST. GFR  (AFRICAN AMERICAN): >60 ML/MIN/1.73 M^2
EST. GFR  (NON AFRICAN AMERICAN): >60 ML/MIN/1.73 M^2
GLUCOSE SERPL-MCNC: 129 MG/DL (ref 70–110)
GLUCOSE SERPL-MCNC: 160 MG/DL (ref 70–110)
GLUCOSE SERPL-MCNC: 170 MG/DL (ref 70–110)
HCO3 UR-SCNC: 21.4 MMOL/L (ref 24–28)
HCO3 UR-SCNC: 23.1 MMOL/L (ref 24–28)
HCT VFR BLD AUTO: 41.4 % (ref 40–54)
HCT VFR BLD CALC: 40 %PCV (ref 36–54)
HCT VFR BLD CALC: 42 %PCV (ref 36–54)
HGB BLD-MCNC: 13.8 G/DL (ref 14–18)
HGB BLD-MCNC: 14 G/DL
HGB BLD-MCNC: 14 G/DL
LYMPHOCYTES # BLD AUTO: 0.7 K/UL (ref 1–4.8)
LYMPHOCYTES NFR BLD: 6 % (ref 18–48)
MCH RBC QN AUTO: 27.6 PG (ref 27–31)
MCHC RBC AUTO-ENTMCNC: 33.3 G/DL (ref 32–36)
MCV RBC AUTO: 83 FL (ref 82–98)
MONOCYTES # BLD AUTO: 0.7 K/UL (ref 0.3–1)
MONOCYTES NFR BLD: 5.4 % (ref 4–15)
NEUTROPHILS # BLD AUTO: 10.6 K/UL (ref 1.8–7.7)
NEUTROPHILS NFR BLD: 88.6 % (ref 38–73)
PCO2 BLDA: 35.9 MMHG (ref 35–45)
PCO2 BLDA: 36.9 MMHG (ref 35–45)
PH SMN: 7.38 [PH] (ref 7.35–7.45)
PH SMN: 7.4 [PH] (ref 7.35–7.45)
PLATELET # BLD AUTO: 231 K/UL (ref 150–350)
PMV BLD AUTO: 9.5 FL (ref 9.2–12.9)
PO2 BLDA: 121 MMHG (ref 40–60)
PO2 BLDA: 159 MMHG (ref 80–100)
POC BE: -2 MMOL/L
POC BE: -4 MMOL/L
POC IONIZED CALCIUM: 1.1 MMOL/L (ref 1.06–1.42)
POC IONIZED CALCIUM: 1.11 MMOL/L (ref 1.06–1.42)
POC SATURATED O2: 99 % (ref 95–100)
POC SATURATED O2: 99 % (ref 95–100)
POC TCO2: 22 MMOL/L (ref 23–27)
POC TCO2: 24 MMOL/L (ref 24–29)
POCT GLUCOSE: 134 MG/DL (ref 70–110)
POTASSIUM BLD-SCNC: 4.9 MMOL/L (ref 3.5–5.1)
POTASSIUM BLD-SCNC: 5 MMOL/L (ref 3.5–5.1)
POTASSIUM SERPL-SCNC: 4.1 MMOL/L (ref 3.5–5.1)
RBC # BLD AUTO: 5 M/UL (ref 4.6–6.2)
SAMPLE: ABNORMAL
SAMPLE: ABNORMAL
SODIUM BLD-SCNC: 139 MMOL/L (ref 136–145)
SODIUM BLD-SCNC: 139 MMOL/L (ref 136–145)
SODIUM SERPL-SCNC: 138 MMOL/L (ref 136–145)
WBC # BLD AUTO: 12.03 K/UL (ref 3.9–12.7)

## 2019-07-16 PROCEDURE — C9290 INJ, BUPIVACAINE LIPOSOME: HCPCS | Performed by: SURGERY

## 2019-07-16 PROCEDURE — P9045 ALBUMIN (HUMAN), 5%, 250 ML: HCPCS | Mod: JG | Performed by: NURSE ANESTHETIST, CERTIFIED REGISTERED

## 2019-07-16 PROCEDURE — 88309 TISSUE EXAM BY PATHOLOGIST: CPT | Mod: 26,,, | Performed by: PATHOLOGY

## 2019-07-16 PROCEDURE — 63600175 PHARM REV CODE 636 W HCPCS: Performed by: STUDENT IN AN ORGANIZED HEALTH CARE EDUCATION/TRAINING PROGRAM

## 2019-07-16 PROCEDURE — S0028 INJECTION, FAMOTIDINE, 20 MG: HCPCS | Performed by: SURGERY

## 2019-07-16 PROCEDURE — 88341 IMHCHEM/IMCYTCHM EA ADD ANTB: CPT | Mod: 26,,, | Performed by: PATHOLOGY

## 2019-07-16 PROCEDURE — 37000008 HC ANESTHESIA 1ST 15 MINUTES: Performed by: SURGERY

## 2019-07-16 PROCEDURE — 63600175 PHARM REV CODE 636 W HCPCS: Performed by: SURGERY

## 2019-07-16 PROCEDURE — 25000003 PHARM REV CODE 250: Performed by: SURGERY

## 2019-07-16 PROCEDURE — 88304 TISSUE SPECIMEN TO PATHOLOGY - SURGERY: ICD-10-PCS | Mod: 26,,, | Performed by: PATHOLOGY

## 2019-07-16 PROCEDURE — 25000003 PHARM REV CODE 250: Performed by: NURSE ANESTHETIST, CERTIFIED REGISTERED

## 2019-07-16 PROCEDURE — 88341 PR IHC OR ICC EACH ADD'L SINGLE ANTIBODY  STAINPR: ICD-10-PCS | Mod: 26,,, | Performed by: PATHOLOGY

## 2019-07-16 PROCEDURE — 88342 IMHCHEM/IMCYTCHM 1ST ANTB: CPT | Mod: 26,,, | Performed by: PATHOLOGY

## 2019-07-16 PROCEDURE — 37000009 HC ANESTHESIA EA ADD 15 MINS: Performed by: SURGERY

## 2019-07-16 PROCEDURE — 63600175 PHARM REV CODE 636 W HCPCS: Performed by: NURSE ANESTHETIST, CERTIFIED REGISTERED

## 2019-07-16 PROCEDURE — 27201423 OPTIME MED/SURG SUP & DEVICES STERILE SUPPLY: Performed by: SURGERY

## 2019-07-16 PROCEDURE — 88307 TISSUE EXAM BY PATHOLOGIST: CPT | Performed by: PATHOLOGY

## 2019-07-16 PROCEDURE — 88342 CHG IMMUNOCYTOCHEMISTRY: ICD-10-PCS | Mod: 26,,, | Performed by: PATHOLOGY

## 2019-07-16 PROCEDURE — 88309 TISSUE SPECIMEN TO PATHOLOGY - SURGERY: ICD-10-PCS | Mod: 26,,, | Performed by: PATHOLOGY

## 2019-07-16 PROCEDURE — 85025 COMPLETE CBC W/AUTO DIFF WBC: CPT

## 2019-07-16 PROCEDURE — 20000000 HC ICU ROOM

## 2019-07-16 PROCEDURE — 80048 BASIC METABOLIC PNL TOTAL CA: CPT

## 2019-07-16 PROCEDURE — 88307 TISSUE SPECIMEN TO PATHOLOGY - SURGERY: ICD-10-PCS | Mod: 26,,, | Performed by: PATHOLOGY

## 2019-07-16 PROCEDURE — 88304 TISSUE EXAM BY PATHOLOGIST: CPT | Mod: 26,,, | Performed by: PATHOLOGY

## 2019-07-16 PROCEDURE — 36000709 HC OR TIME LEV III EA ADD 15 MIN: Performed by: SURGERY

## 2019-07-16 PROCEDURE — 88307 TISSUE EXAM BY PATHOLOGIST: CPT | Mod: 26,,, | Performed by: PATHOLOGY

## 2019-07-16 PROCEDURE — 36000708 HC OR TIME LEV III 1ST 15 MIN: Performed by: SURGERY

## 2019-07-16 RX ORDER — BACITRACIN 50000 [IU]/1
INJECTION, POWDER, FOR SOLUTION INTRAMUSCULAR
Status: DISCONTINUED | OUTPATIENT
Start: 2019-07-16 | End: 2019-07-16 | Stop reason: HOSPADM

## 2019-07-16 RX ORDER — ACETAMINOPHEN 500 MG
1000 TABLET ORAL
Status: DISCONTINUED | OUTPATIENT
Start: 2019-07-16 | End: 2019-07-16

## 2019-07-16 RX ORDER — ENOXAPARIN SODIUM 100 MG/ML
30 INJECTION SUBCUTANEOUS
Status: DISCONTINUED | OUTPATIENT
Start: 2019-07-16 | End: 2019-07-16

## 2019-07-16 RX ORDER — LABETALOL HYDROCHLORIDE 5 MG/ML
5 INJECTION, SOLUTION INTRAVENOUS
Status: DISCONTINUED | OUTPATIENT
Start: 2019-07-16 | End: 2019-07-23 | Stop reason: HOSPADM

## 2019-07-16 RX ORDER — KETOROLAC TROMETHAMINE 30 MG/ML
15 INJECTION, SOLUTION INTRAMUSCULAR; INTRAVENOUS
Status: DISCONTINUED | OUTPATIENT
Start: 2019-07-16 | End: 2019-07-16 | Stop reason: HOSPADM

## 2019-07-16 RX ORDER — FAMOTIDINE 10 MG/ML
20 INJECTION INTRAVENOUS
Status: COMPLETED | OUTPATIENT
Start: 2019-07-16 | End: 2019-07-16

## 2019-07-16 RX ORDER — ONDANSETRON 2 MG/ML
8 INJECTION INTRAMUSCULAR; INTRAVENOUS
Status: COMPLETED | OUTPATIENT
Start: 2019-07-16 | End: 2019-07-16

## 2019-07-16 RX ORDER — ONDANSETRON 2 MG/ML
8 INJECTION INTRAMUSCULAR; INTRAVENOUS
Status: DISCONTINUED | OUTPATIENT
Start: 2019-07-16 | End: 2019-07-16

## 2019-07-16 RX ORDER — ALBUTEROL SULFATE 2.5 MG/.5ML
2.5 SOLUTION RESPIRATORY (INHALATION) EVERY 4 HOURS PRN
Status: DISCONTINUED | OUTPATIENT
Start: 2019-07-16 | End: 2019-07-23 | Stop reason: HOSPADM

## 2019-07-16 RX ORDER — PREGABALIN 75 MG/1
75 CAPSULE ORAL
Status: DISCONTINUED | OUTPATIENT
Start: 2019-07-16 | End: 2019-07-16

## 2019-07-16 RX ORDER — KETOROLAC TROMETHAMINE 30 MG/ML
10 INJECTION, SOLUTION INTRAMUSCULAR; INTRAVENOUS EVERY 6 HOURS
Status: DISPENSED | OUTPATIENT
Start: 2019-07-16 | End: 2019-07-18

## 2019-07-16 RX ORDER — ISOSULFAN BLUE 50 MG/5ML
INJECTION, SOLUTION SUBCUTANEOUS
Status: DISCONTINUED | OUTPATIENT
Start: 2019-07-16 | End: 2019-07-16 | Stop reason: HOSPADM

## 2019-07-16 RX ORDER — ALBUMIN HUMAN 50 G/1000ML
SOLUTION INTRAVENOUS CONTINUOUS PRN
Status: DISCONTINUED | OUTPATIENT
Start: 2019-07-16 | End: 2019-07-16

## 2019-07-16 RX ORDER — SODIUM CHLORIDE 9 MG/ML
INJECTION, SOLUTION INTRAVENOUS CONTINUOUS PRN
Status: DISCONTINUED | OUTPATIENT
Start: 2019-07-16 | End: 2019-07-16

## 2019-07-16 RX ORDER — OCTREOTIDE ACETATE 100 UG/ML
INJECTION, SOLUTION INTRAVENOUS; SUBCUTANEOUS
Status: DISCONTINUED | OUTPATIENT
Start: 2019-07-16 | End: 2019-07-16

## 2019-07-16 RX ORDER — KETOROLAC TROMETHAMINE 30 MG/ML
30 INJECTION, SOLUTION INTRAMUSCULAR; INTRAVENOUS ONCE
Status: DISPENSED | OUTPATIENT
Start: 2019-07-16 | End: 2019-07-19

## 2019-07-16 RX ORDER — FENTANYL CITRATE 50 UG/ML
INJECTION, SOLUTION INTRAMUSCULAR; INTRAVENOUS
Status: DISCONTINUED | OUTPATIENT
Start: 2019-07-16 | End: 2019-07-16

## 2019-07-16 RX ORDER — ACETAMINOPHEN 500 MG
1000 TABLET ORAL
Status: COMPLETED | OUTPATIENT
Start: 2019-07-16 | End: 2019-07-16

## 2019-07-16 RX ORDER — PREGABALIN 75 MG/1
75 CAPSULE ORAL
Status: DISCONTINUED | OUTPATIENT
Start: 2019-07-16 | End: 2019-07-16 | Stop reason: HOSPADM

## 2019-07-16 RX ORDER — ROCURONIUM BROMIDE 10 MG/ML
INJECTION, SOLUTION INTRAVENOUS
Status: DISCONTINUED | OUTPATIENT
Start: 2019-07-16 | End: 2019-07-16

## 2019-07-16 RX ORDER — FAMOTIDINE 10 MG/ML
20 INJECTION INTRAVENOUS
Status: DISCONTINUED | OUTPATIENT
Start: 2019-07-16 | End: 2019-07-16

## 2019-07-16 RX ORDER — LIDOCAINE HYDROCHLORIDE 10 MG/ML
1 INJECTION, SOLUTION EPIDURAL; INFILTRATION; INTRACAUDAL; PERINEURAL ONCE
Status: DISCONTINUED | OUTPATIENT
Start: 2019-07-16 | End: 2019-07-16 | Stop reason: HOSPADM

## 2019-07-16 RX ORDER — DIPHENHYDRAMINE HYDROCHLORIDE 50 MG/ML
12.5 INJECTION INTRAMUSCULAR; INTRAVENOUS EVERY 4 HOURS PRN
Status: DISCONTINUED | OUTPATIENT
Start: 2019-07-16 | End: 2019-07-23 | Stop reason: HOSPADM

## 2019-07-16 RX ORDER — KETOROLAC TROMETHAMINE 30 MG/ML
15 INJECTION, SOLUTION INTRAMUSCULAR; INTRAVENOUS
Status: DISCONTINUED | OUTPATIENT
Start: 2019-07-16 | End: 2019-07-16

## 2019-07-16 RX ORDER — NALOXONE HCL 0.4 MG/ML
0.02 VIAL (ML) INJECTION
Status: DISCONTINUED | OUTPATIENT
Start: 2019-07-16 | End: 2019-07-23 | Stop reason: HOSPADM

## 2019-07-16 RX ORDER — ENOXAPARIN SODIUM 100 MG/ML
30 INJECTION SUBCUTANEOUS
Status: COMPLETED | OUTPATIENT
Start: 2019-07-16 | End: 2019-07-16

## 2019-07-16 RX ORDER — ONDANSETRON 2 MG/ML
4 INJECTION INTRAMUSCULAR; INTRAVENOUS EVERY 6 HOURS PRN
Status: DISCONTINUED | OUTPATIENT
Start: 2019-07-16 | End: 2019-07-23 | Stop reason: HOSPADM

## 2019-07-16 RX ORDER — PROPOFOL 10 MG/ML
VIAL (ML) INTRAVENOUS
Status: DISCONTINUED | OUTPATIENT
Start: 2019-07-16 | End: 2019-07-16

## 2019-07-16 RX ORDER — KETOROLAC TROMETHAMINE 15 MG/ML
15 INJECTION, SOLUTION INTRAMUSCULAR; INTRAVENOUS
Status: DISCONTINUED | OUTPATIENT
Start: 2019-07-16 | End: 2019-07-16

## 2019-07-16 RX ORDER — HYDROMORPHONE HYDROCHLORIDE 1 MG/ML
0.5 INJECTION, SOLUTION INTRAMUSCULAR; INTRAVENOUS; SUBCUTANEOUS ONCE
Status: COMPLETED | OUTPATIENT
Start: 2019-07-16 | End: 2019-07-16

## 2019-07-16 RX ORDER — INDOCYANINE GREEN AND WATER 25 MG
KIT INJECTION
Status: DISCONTINUED | OUTPATIENT
Start: 2019-07-16 | End: 2019-07-16

## 2019-07-16 RX ORDER — PREGABALIN 75 MG/1
75 CAPSULE ORAL 2 TIMES DAILY
Status: DISCONTINUED | OUTPATIENT
Start: 2019-07-16 | End: 2019-07-23 | Stop reason: HOSPADM

## 2019-07-16 RX ORDER — HYDRALAZINE HYDROCHLORIDE 20 MG/ML
5 INJECTION INTRAMUSCULAR; INTRAVENOUS
Status: DISCONTINUED | OUTPATIENT
Start: 2019-07-16 | End: 2019-07-18

## 2019-07-16 RX ORDER — DEXTROSE MONOHYDRATE, SODIUM CHLORIDE, AND POTASSIUM CHLORIDE 50; 1.49; 4.5 G/1000ML; G/1000ML; G/1000ML
INJECTION, SOLUTION INTRAVENOUS CONTINUOUS
Status: DISCONTINUED | OUTPATIENT
Start: 2019-07-16 | End: 2019-07-21

## 2019-07-16 RX ORDER — ISOSULFAN BLUE 50 MG/5ML
5 INJECTION, SOLUTION SUBCUTANEOUS ONCE
Status: DISCONTINUED | OUTPATIENT
Start: 2019-07-16 | End: 2019-07-23 | Stop reason: HOSPADM

## 2019-07-16 RX ORDER — MAGNESIUM SULFATE HEPTAHYDRATE 40 MG/ML
2 INJECTION, SOLUTION INTRAVENOUS ONCE
Status: COMPLETED | OUTPATIENT
Start: 2019-07-16 | End: 2019-07-16

## 2019-07-16 RX ORDER — GLYCOPYRROLATE 0.2 MG/ML
INJECTION INTRAMUSCULAR; INTRAVENOUS
Status: DISCONTINUED | OUTPATIENT
Start: 2019-07-16 | End: 2019-07-16

## 2019-07-16 RX ORDER — BUPIVACAINE HYDROCHLORIDE 2.5 MG/ML
INJECTION, SOLUTION EPIDURAL; INFILTRATION; INTRACAUDAL
Status: DISCONTINUED | OUTPATIENT
Start: 2019-07-16 | End: 2019-07-16 | Stop reason: HOSPADM

## 2019-07-16 RX ORDER — LIDOCAINE HCL/PF 100 MG/5ML
SYRINGE (ML) INTRAVENOUS
Status: DISCONTINUED | OUTPATIENT
Start: 2019-07-16 | End: 2019-07-16

## 2019-07-16 RX ORDER — ACETAMINOPHEN 10 MG/ML
1000 INJECTION, SOLUTION INTRAVENOUS EVERY 8 HOURS
Status: COMPLETED | OUTPATIENT
Start: 2019-07-16 | End: 2019-07-17

## 2019-07-16 RX ORDER — NEOSTIGMINE METHYLSULFATE 1 MG/ML
INJECTION, SOLUTION INTRAVENOUS
Status: DISCONTINUED | OUTPATIENT
Start: 2019-07-16 | End: 2019-07-16

## 2019-07-16 RX ORDER — HYDROMORPHONE HCL IN 0.9% NACL 6 MG/30 ML
PATIENT CONTROLLED ANALGESIA SYRINGE INTRAVENOUS CONTINUOUS
Status: DISCONTINUED | OUTPATIENT
Start: 2019-07-16 | End: 2019-07-19

## 2019-07-16 RX ORDER — PHENYLEPHRINE HYDROCHLORIDE 10 MG/ML
INJECTION INTRAVENOUS
Status: DISCONTINUED | OUTPATIENT
Start: 2019-07-16 | End: 2019-07-16

## 2019-07-16 RX ORDER — MIDAZOLAM HYDROCHLORIDE 1 MG/ML
INJECTION, SOLUTION INTRAMUSCULAR; INTRAVENOUS
Status: DISCONTINUED | OUTPATIENT
Start: 2019-07-16 | End: 2019-07-16

## 2019-07-16 RX ORDER — SUCCINYLCHOLINE CHLORIDE 20 MG/ML
INJECTION INTRAMUSCULAR; INTRAVENOUS
Status: DISCONTINUED | OUTPATIENT
Start: 2019-07-16 | End: 2019-07-16

## 2019-07-16 RX ORDER — SODIUM CHLORIDE, SODIUM LACTATE, POTASSIUM CHLORIDE, CALCIUM CHLORIDE 600; 310; 30; 20 MG/100ML; MG/100ML; MG/100ML; MG/100ML
INJECTION, SOLUTION INTRAVENOUS CONTINUOUS
Status: DISCONTINUED | OUTPATIENT
Start: 2019-07-16 | End: 2019-07-16

## 2019-07-16 RX ADMIN — PROPOFOL 200 MG: 10 INJECTION, EMULSION INTRAVENOUS at 08:07

## 2019-07-16 RX ADMIN — INDOCYANINE GREEN 5 MG: KIT INTRAVENOUS at 12:07

## 2019-07-16 RX ADMIN — ROCURONIUM BROMIDE 25 MG: 10 INJECTION, SOLUTION INTRAVENOUS at 08:07

## 2019-07-16 RX ADMIN — ACETAMINOPHEN 1000 MG: 500 TABLET ORAL at 07:07

## 2019-07-16 RX ADMIN — HYDROCORTISONE SODIUM SUCCINATE 50 MG: 100 INJECTION, POWDER, FOR SOLUTION INTRAMUSCULAR; INTRAVENOUS at 07:07

## 2019-07-16 RX ADMIN — MIDAZOLAM 2 MG: 1 INJECTION INTRAMUSCULAR; INTRAVENOUS at 08:07

## 2019-07-16 RX ADMIN — SODIUM CHLORIDE: 0.9 INJECTION, SOLUTION INTRAVENOUS at 08:07

## 2019-07-16 RX ADMIN — AMPICILLIN SODIUM AND SULBACTAM SODIUM 3 G: 2; 1 INJECTION, POWDER, FOR SOLUTION INTRAMUSCULAR; INTRAVENOUS at 09:07

## 2019-07-16 RX ADMIN — ROCURONIUM BROMIDE 20 MG: 10 INJECTION, SOLUTION INTRAVENOUS at 11:07

## 2019-07-16 RX ADMIN — AMPICILLIN SODIUM AND SULBACTAM SODIUM 3 G: 2; 1 INJECTION, POWDER, FOR SOLUTION INTRAMUSCULAR; INTRAVENOUS at 12:07

## 2019-07-16 RX ADMIN — PHENYLEPHRINE HYDROCHLORIDE 100 MCG: 10 INJECTION INTRAVENOUS at 12:07

## 2019-07-16 RX ADMIN — SUCCINYLCHOLINE CHLORIDE 140 MG: 20 INJECTION, SOLUTION INTRAMUSCULAR; INTRAVENOUS at 08:07

## 2019-07-16 RX ADMIN — GLYCOPYRROLATE 0.4 MG: 0.2 INJECTION, SOLUTION INTRAMUSCULAR; INTRAVENOUS at 01:07

## 2019-07-16 RX ADMIN — PREGABALIN 75 MG: 75 CAPSULE ORAL at 08:07

## 2019-07-16 RX ADMIN — ENOXAPARIN SODIUM 30 MG: 100 INJECTION SUBCUTANEOUS at 07:07

## 2019-07-16 RX ADMIN — CALCIUM GLUCONATE 1 G: 98 INJECTION, SOLUTION INTRAVENOUS at 03:07

## 2019-07-16 RX ADMIN — ROCURONIUM BROMIDE 20 MG: 10 INJECTION, SOLUTION INTRAVENOUS at 10:07

## 2019-07-16 RX ADMIN — FENTANYL CITRATE 50 MCG: 50 INJECTION, SOLUTION INTRAMUSCULAR; INTRAVENOUS at 11:07

## 2019-07-16 RX ADMIN — DEXTROSE, SODIUM CHLORIDE, AND POTASSIUM CHLORIDE: 5; .45; .15 INJECTION INTRAVENOUS at 02:07

## 2019-07-16 RX ADMIN — MAGNESIUM SULFATE IN WATER 2 G: 40 INJECTION, SOLUTION INTRAVENOUS at 02:07

## 2019-07-16 RX ADMIN — GLYCOPYRROLATE 0.4 MG: 0.2 INJECTION, SOLUTION INTRAMUSCULAR; INTRAVENOUS at 10:07

## 2019-07-16 RX ADMIN — FENTANYL CITRATE 100 MCG: 50 INJECTION, SOLUTION INTRAMUSCULAR; INTRAVENOUS at 08:07

## 2019-07-16 RX ADMIN — HYDROMORPHONE HYDROCHLORIDE 0.5 MG: 1 INJECTION, SOLUTION INTRAMUSCULAR; INTRAVENOUS; SUBCUTANEOUS at 02:07

## 2019-07-16 RX ADMIN — FAMOTIDINE 20 MG: 10 INJECTION, SOLUTION INTRAVENOUS at 07:07

## 2019-07-16 RX ADMIN — INDOCYANINE GREEN 5 MG: KIT INTRAVENOUS at 11:07

## 2019-07-16 RX ADMIN — KETOROLAC TROMETHAMINE 10 MG: 30 INJECTION, SOLUTION INTRAMUSCULAR at 06:07

## 2019-07-16 RX ADMIN — AMPICILLIN SODIUM AND SULBACTAM SODIUM 3 G: 2; 1 INJECTION, POWDER, FOR SOLUTION INTRAMUSCULAR; INTRAVENOUS at 07:07

## 2019-07-16 RX ADMIN — ROCURONIUM BROMIDE 5 MG: 10 INJECTION, SOLUTION INTRAVENOUS at 08:07

## 2019-07-16 RX ADMIN — OCTREOTIDE ACETATE 250 MCG: 100 INJECTION, SOLUTION INTRAVENOUS; SUBCUTANEOUS at 10:07

## 2019-07-16 RX ADMIN — NEOSTIGMINE METHYLSULFATE 3 MG: 1 INJECTION INTRAVENOUS at 01:07

## 2019-07-16 RX ADMIN — Medication: at 02:07

## 2019-07-16 RX ADMIN — ONDANSETRON 8 MG: 2 INJECTION INTRAMUSCULAR; INTRAVENOUS at 07:07

## 2019-07-16 RX ADMIN — ROCURONIUM BROMIDE 20 MG: 10 INJECTION, SOLUTION INTRAVENOUS at 09:07

## 2019-07-16 RX ADMIN — PREGABALIN 75 MG: 75 CAPSULE ORAL at 07:07

## 2019-07-16 RX ADMIN — ACETAMINOPHEN 1000 MG: 10 INJECTION, SOLUTION INTRAVENOUS at 06:07

## 2019-07-16 RX ADMIN — CALCIUM GLUCONATE 1 G: 98 INJECTION, SOLUTION INTRAVENOUS at 02:07

## 2019-07-16 RX ADMIN — ALBUMIN (HUMAN): 2.5 SOLUTION INTRAVENOUS at 10:07

## 2019-07-16 RX ADMIN — OCTREOTIDE ACETATE 250 MCG/HR: 1000 INJECTION, SOLUTION INTRAVENOUS; SUBCUTANEOUS at 08:07

## 2019-07-16 RX ADMIN — ALBUMIN (HUMAN): 2.5 SOLUTION INTRAVENOUS at 01:07

## 2019-07-16 RX ADMIN — LIDOCAINE HYDROCHLORIDE 100 MG: 20 INJECTION, SOLUTION INTRAVENOUS at 08:07

## 2019-07-16 RX ADMIN — KETOROLAC TROMETHAMINE 15 MG: 30 INJECTION, SOLUTION INTRAMUSCULAR at 07:07

## 2019-07-16 RX ADMIN — IRON SUCROSE 100 MG: 20 INJECTION, SOLUTION INTRAVENOUS at 03:07

## 2019-07-16 NOTE — NURSING
Pt arrived to unit from the OR. Pt was lightly sedated but easy to arouse. Respirations even and unlabored. S1 S2 heart sounds. Pt complains of pain and rates 8/10 to his abdomen. +2 bilateral radial pulses. <3 sec cap refill. Saline locked. VSS stable. Telemetry monitor applied. Sky restarted. Will continue to monitor.

## 2019-07-16 NOTE — ANESTHESIA PROCEDURE NOTES
Central Line - Right Internal jugular    Diagnosis: carcinoid tumor  Doctor requesting consult: KENTRELL  Patient location during procedure: done in OR  Procedure start time: 7/16/2019 9:04 AM  Timeout: 7/16/2019 8:58 AM  Procedure end time: 7/16/2019 9:14 AM    Staffing  Authorizing Provider: Jori Smith MD  Performing Provider: Jori Smith MD    Staffing  Anesthesiologist: Jori Smith MD  Performed: anesthesiologist   Anesthesiologist was present at the time of the procedure.  Preanesthetic Checklist  Completed: patient identified, site marked, surgical consent, pre-op evaluation, timeout performed, IV checked, risks and benefits discussed, monitors and equipment checked and anesthesia consent given  Indication   Indication: hemodynamic monitoring, vascular access, med administration     Anesthesia   general anesthesia    Central Line   Skin Prep: skin prepped with ChloraPrep, skin prep agent completely dried prior to procedure  maximum sterile barriers used during central venous catheter insertion  hand hygiene performed prior to central venous catheter insertion  Location: right, internal jugular.   Catheter type: triple lumen  Catheter Size: 8.5 Fr  Inserted Catheter Length: 16 cm  Ultrasound: vascular probe with ultrasound  Vessel Caliber: large, patent  Vascular Doppler:  not done, compressibility normal  Needle advanced into vessel with real time Ultrasound guidance.  Guidewire confirmed in vessel.  Image recorded and saved.  Manometry: none  Insertion Attempts: 1   Securement:line sutured, chlorhexidine patch, blood return through all ports and sterile dressing applied    Post-Procedure   X-Ray: successful placement  Adverse Events:none    Guidewire Guidewire removed intact.   Additional Notes  Patient identified by name and date of birth.  General anesthesia induced.  Catheter ports flushed with saline..E-Z atraumatic placement on the first pass with artery lateral to vein.  Aspiration + via  needle/syringe prior to easy wire passage.  Small skin incision made prior to the easy passage of triple lumen catheter.   Catheter secured with suture through blue/whitecatheter harness and then sterile dressing applied.

## 2019-07-16 NOTE — TRANSFER OF CARE
"Anesthesia Transfer of Care Note    Patient: Aubrey Gallagher    Procedure(s) Performed: Procedure(s) (LRB):  LYMPHADENECTOMY (N/A)  EXCISION, SMALL INTESTINE (N/A)  APPENDECTOMY  CHOLECYSTECTOMY  LAPAROTOMY, EXPLORATORY    Patient location: ICU    Anesthesia Type: general    Transport from OR: Transported from OR on 6-10 L/min O2 by face mask with adequate spontaneous ventilation    Post pain: adequate analgesia    Post assessment: no apparent anesthetic complications    Post vital signs: stable    Level of consciousness: awake, alert and oriented    Nausea/Vomiting: no nausea/vomiting    Complications: none    Transfer of care protocol was followed      Last vitals:   Visit Vitals  /63 (BP Location: Right arm, Patient Position: Sitting)   Pulse 66   Temp 36.8 °C (98.2 °F) (Skin)   Resp 18   Ht 5' 9" (1.753 m)   Wt 113.4 kg (250 lb)   SpO2 96%   BMI 36.92 kg/m²     "

## 2019-07-16 NOTE — OP NOTE
DATE OF PROCEDURE:  07/16/2019.    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    POSTOPERATIVE DIAGNOSIS:  1.  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  2.  Chronic cholecystitis.  3.  Chronic appendicitis.  4.  Mesenteric lymphadenopathy.    PROCEDURES DONE:  1.  Exploratory laparotomy.  2.  Young Harris lymph node dissection.  3.  Appendectomy.  4.  Cholecystectomy.  5.  Small bowel resection of 140 cms  6.  Mesenteric lymphadenectomy.  7.  Ultrasound of the liver.  8.  Small bowel resection with ICG perfusion and infrared camera vision.  9.  Intralesional chemo with 5-FU.  10. Unlisted abdomen      SURGEON:  Renetta Plummer M.D.    SURGERY RESIDENT:  Dr. Ciera Nowak.    EHSAN RESIDENT:  Dr. Luz Elena Bustillo.    BLOOD LOSS:  Less than 50 mL.    The patient was stable, transferred to ICU in a stable condition with no   complication.    HISTORY:  This is a 44-year-old gentleman who went to the Emergency Room for   abdominal pain a few months back, was eventually found to have a mesenteric mass   on CAT scan.  Because of the finding, he was evaluated and referred for further   management.  The patient underwent workup.  He had a gallium scan, which showed   uptake in the small bowel with mesenteric uptake.  It did not have any uptake   in the liver or anywhere else.  The patient did not have any symptoms of   carcinoid syndrome; however, he had few episodes of cramping abdominal pain.    After complete evaluation, I strongly recommended that the patient would require   small bowel resection with evaluation of the liver with lymphadenectomy of the   mesenteric mass.  I went over with him about the risk and benefits of the   surgery such as bleeding, infection, DVT, PE, MI, anastomotic leaks requiring   redo surgery, reexplorations, sepsis and exploration for bleeding.  After going   over all the  risk and benefits, consent was obtained.  The patient was seen   again yesterday.  I reiterated all the risk and benefits.  I also talked to him   about the natural course of the disease and the chance of recurrence of the   disease.  He was enquiring about the cholecystectomy part.  Since the patient   has a large mesenteric mass, there is a chance of recurrence and if he recurs,   he would require octreotide and one on the complication of octreotide was the   gallstone.  I explained to the whole family and they were okay with the   procedure.    FINDINGS:  The patient had a multicentric tumor in the ileum more than 15 in   number.  Some of them partially obstructing.  He had a large mesenteric lymph   javier mass almost in the periphery.  His appendix was found to be long and   mildly inflamed.  The liver did not have any lesions.  Ultrasound did not show   any lesions.  The diaphragm and the pelvic floor were all clear of any tumors.    PROCEDURE IN DETAIL:  The patient was brought to the Operating Room with IV   Sandostatin on board.  He was placed in supine position.  He was then sedated   and intubated.  A central line and arterial line was placed by Anesthesia staff.    A Turcios catheter was placed in the bladder.  A time-out was done to verify the   ID of the patient and the procedure and everyone concurred.    A midline incision was made.  Fascia was incised.  Abdominal cavity was entered.    Wound protector was placed.  Abdominal wall was retracted using Wilde   retractor appropriately.  The small bowel was run all the way from ligament of   Treitz to the terminal ileum about four times.  At the mid ileum, the patient   started to have carcinoid tumors and going distally, the patient had numerous   tumors all the way up close to about 60 cm from the terminal ileum.  The patient   had more than 15 in number, some of them partially obstructing.  I examined the   terminal ileum.  The appendix was found to be  long and mildly inflamed.    Therefore, I went ahead and did the appendectomy.  The mesoappendix was taken   on.  The base of the appendix was ligated with 2-0 silk tie x2 and the stump was   invaginated.    Using Lymphazurin blue dye, 1 mL of dye was injected proximal and distally to   the most proximal and distal tumor and pressure was applied to the area.  At   this point, attention was focused on to the right upper quadrant.  The antegrade   dissection of the gallbladder was done, dissection was done all the way until   the cystic artery and cystic duct were reached.  Both the structures were   clamped and was tied using 0 silk ties x2.  The gallbladder was taken out as a   specimen.  Meticulous hemostasis was accomplished on the liver bed.  Following   this, ultrasound of the liver was accomplished.  The liver was felt and so was   the diaphragm.  There was no evidence of any lesion.  The liver felt smooth,   completely normal.  The ultrasound did not show any lesions anywhere on the   liver and inside the parenchyma.  Both the diaphragms were free of any tumor.    Also, I checked the pelvic floor at the same time.  The pelvic floor and the   retroperitoneum did not have any evidence of any deposits.    Following this, the small bowel, most proximal to the proximal lesion was   transected using GIOVANI-75 stapler and distal to the distal tumor was transected.    The mesentery was taken down using the LigaSure until the dissection was done   proximal to the mesenteric javier mass.  The whole specimen was taken out.  About   140 cm of the small bowel was resected.  A tennis racquet shaped incision was   made over the base of the mesentery.  Dissection was done.  Just anterior to the   superior mesenteric vein and the artery, the whole lymphatic tissue, perineural   tissue and the peritoneum overlying the base of the mesentery was resected and   taken out as the most proximal mesenteric lymph node.  Following this,  Surgicel   was placed over the area and pressure was applied.    The small bowel ends were brought close to each other, 2 mL of indocyanine green   was injected IV.  Using infrared camera, the ends were visualized.  The patient   had excellent perfusion.  The small bowel ends were adequately perfused.  An   isoperistaltic anastomosis using a combination of  sewn technique   was accomplished.  Following the anastomosis again another 2 mL of ICG was   injected and perfusion was found to be excellent.  The mesentery was closed in 2   layers with a sandwich soaked in 5-FU with Gelfoam along with markers.    Following this, the whole abdomen was extensively and copiously irrigated and   was completely drained.  Meticulous hemostasis was accomplished.  The small   bowel was run one more time.  There was found to be no evidence of any tumor.    Following this, the omentum was placed over the whole infracolic compartment.    The abdominal wall was infiltrated with a dilute mixture of Marcaine, Exparel   and normal saline.  The fascia was approximated with running #1 PDS stitch.  The   instrument and sponge counts were correct.  KUB did not show any evidence of   foreign body.  The skin was closed using 4-0 Monocryl and Dermabond was applied   over the incision.  The patient was then extubated and taken to the Recovery   Room in stable condition with no complication.      TR/IN  dd: 07/16/2019 13:32:14 (CDT)  td: 07/16/2019 14:07:04 (HOOD)  Doc ID   #[id number]  Job ID #[id number]    CC:

## 2019-07-16 NOTE — INTERVAL H&P NOTE
The patient has been examined and the H&P has been reviewed:    I concur with the findings and no changes have occurred since H&P was written.    Anesthesia/Surgery risks, benefits and alternative options discussed and understood by patient/family.          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

## 2019-07-16 NOTE — OP NOTE
[facility]  Surgery Department  Operative Note    SUMMARY     Date of Procedure: 7/16/2019     Procedure:   1. Ex lap  2. sentinl lymph node mapping  3. Appendectomy  4. Cholecystectomy  5. US liver  6. Small bowel resection  7. Mesenteric lymphadenectomy  8. Small bowel anastomosis with ICG perfusion and infrared vision   9.intrlesional chemo with 5 FU  10. 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.012]    Post-Operative Diagnosis: Post-Op Diagnosis Codes:      Malignant carcinoid tumor of ileum [C7A.012] multicentric partially obstructing  Chronic cholecystitis  chronic appendicitis  Mesenteric lymphadenopathy    Anesthesia: General    Complications: no  [id number]  Estimated Blood Loss (EBL): 50 ml         Implants: none  Specimens:   Specimen (12h ago, onward)    Start     Ordered    07/16/19 1241  Specimen to Pathology - Surgery  Once     Comments:  Pre-op Diagnosis: Malignant carcinoid tumor of ileum [C7A.012]Post-op Diagnosis: PendingProcedure(s):EXCISION, LIVERLYMPHADENECTOMYEXCISION, SMALL INTESTINE Number of specimens: 5Name of specimens: 1. Appendix2. Gallbladder3. Small Bowel4. Mesenteric lymph node5. Highest mesenteric lymph node     Start Status     07/16/19 1241 Needs to be Collected Order ID: [id number]       07/16/19 1255    07/16/19 1154  Specimen to Pathology - Surgery  Once,   Status:  Canceled     Comments:  Pre-op Diagnosis: Malignant carcinoid tumor of ileum [C7A.012]Post-op Diagnosis: PendingProcedure(s):EXCISION, LIVERLYMPHADENECTOMYEXCISION, SMALL INTESTINE Number of specimens: 1Name of specimens: 1. Appendix2. Gallbladder3. Small Bowel4. Mesenteric lymph node5. Highest mesenteric lymph node     Start Status     07/16/19 1154 Canceled Order ID: [id number]       07/16/19 1154    07/16/19 1132  Specimen to Pathology -  Surgery  Once,   Status:  Canceled     Comments:  Pre-op Diagnosis: Malignant carcinoid tumor of ileum [C7A.012]Post-op Diagnosis: PendingProcedure(s):EXCISION, LIVERLYMPHADENECTOMYEXCISION, SMALL INTESTINE Number of specimens: 1Name of specimens: 1. Appendix     Start Status     07/16/19 1132 Canceled Order ID: [id number]       07/16/19 1132                  Condition: Stable    Disposition: ICU - extubated and stable.    Attestation: I was present and scrubbed for the entire procedure.     Operation Form for [facility] Database    Name __ _Aubrey Gallagher                    Date of Birth __1975 _____    Patient Admission Date to hospital:   7/16/2019    Surgeon(s):  Kavitha Carlos MD                     Other ___________       Length of Operation: 3 hours 36 mts    Type of Operation (check all that apply)     Procedure(s):  EXCISION, LIVER  LYMPHADENECTOMY  EXCISION, SMALL INTESTINE  APPENDECTOMY  CHOLECYSTECTOMY     Gastric Resection                   x Small bowel resection                  Colon resection    Hepatic resection                     Pancreatic resection                    Whipple                  x Lymph node resection            x Cholecystectomy                          Adrenalectomy       Lung resection                        x  Mesenteric dissection                   Nephrectomy         Thyroidectomy                         RFA                                             Peritoneal stripping    Explorative                               Lysis of Adhesions              Diaphramatic Resection   Other  _____________________________________        Type of vascular encasements (check all that apply)     x SMA        Renal        Aorta/Cava     Iliac       Karel  Karel Hepatis    Celiac      Was tumor collected?           xYes                 No          If yes, tumor form must be completed by Data staff.                   Neoprobe Used          Yes          x No    Was it helpful in  finding the tumor?      Yes        No     Operative Findings  x Vascular Encasement                                       Mesenteric Extension    x Bowel Obstruction - complete or partial    Bowel Ischemia                                                 Carcinomatosis     Location of Primary Tumor     Mid ileum                                Primary Resected     xYes      No   How many primary sites? More than1 5        % of Tumor Debulked 100%                   %of Mesenteric Tumor Debulked__100%_______  Was sentinel lymph node done?     x Yes       No    Number of [place] [address] Sent _one_____         Number of [place] [address] Positive   __        Evidence of lymph obstruction:    Yes      xNo    Mets to other organs:       Yes         x No                                            If yes, Nodes and Metastases form must be completed  Number of tumors found: __mesenteric lymph javier mass____________   Was tumor left behind? ___no___________   How much small bowel removed (cm)?  140 cm_________    Seprafilm used:    Yes      x No  Visible ovarian masses:             Yes       x No   Visible retroperitoneal nodes:   Yes        x No    Preop Sandostatin used:          x Yes        No    Intraop chemotherapy used:     x Yes         No      Gallstones present :   Yes        x  No     N/A  Visible liver mets:       Yes        x No      Blood transfusion needed:    Yes        x No  Complications__________none_______________________________    Nodes & Metastases Form    Nodes    Resected   Mets  Tissue Resected?      Solid organ/soft     Cervical   Yes No                      Liver   Yes No     Supraclavicular  Yes No   Bone   Yes No     Hilar           Yes No Brain   Yes No        Mediastinal  Yes No  Lung    Yes No     Auxiliary     Yes No Colon   Yes No  x   Mesenteric xYes No Pancreas  Yes No      Periportal Yes No Appendix  Yes No     Retroperitoneal Yes No  Thyroid     Yes No     Celiac Yes No Chest wall  Yes No      Inguinal Yes No  Kidney       Yes   No     Iliac Yes No Breast       Yes   No     Other Yes No Ovary        Yes No   ___________________ Pelvic Floor   Yes   No   Uterus         Yes No   Ureter         Yes   No   Seminal Vesicle YesNo      Spleen     Yes No   L Diaphram   Yes No   R Diaphram   Yes No    Other             Yes No   ____________________    Radiofrequency Ablation Form      Mode: Site:      Open    Liver- R Lobe                        Percutaneous   Liver- L Lobe     Laproscopic    Kidney        Bone        Pelvis         Other  ____________________                                      #of Lesions      Tumor Sizes  ________________ _______________  ________________ _______________  ________________ _______________  ________________ _______________    Complications?   YES  NO  UNKNOWN    Complications____________________________________________________________________________________________________________________________________      Lena Knife Form      Indication: ________________________________________________________________________________________________________________   LIVER   R lobe size   L lobe size          Hepatic rod tumor sizes  _______       _______      _______   _______      Left tumor size_______      _______       _______      _______      _______      Right tumor size_______       _______       _______      _______  _______      Middle tumor size ______     Single probe                  2 Probe           3 Probes            4 Probes   Overlaping ablation       1        2         3        4     HILUM   Central duct-- tumor sizes ________     ________     ________   R duct--     tumor sizes _______       ________      ________   L duct--  tumor sizes _______      ________       ________       MESENTERIC ROOT NODES--tumor sizes _____________      URETER-- tumor sizes _____________     PERIAORTIC-- tumor sizes _______     ________    ________     PELVIC TUMOR  Obturator node          tumor sizes ________     ________     ________   Pres  tumor sizes ________     ________     _______   Other  tumor size _____________      PANCREAS--  tumor size ______                              Head     tumor size _______                            Body      tumor size _______                                                         Tail        tumor size _______     KIDNEY-- tumor size ________   Right        Left       OTHER SITES   _________________        size_____      _________________         size_____     _____ ____________        size_____    Ablation time___________        Complications?        Yes      No   Unknown    Complications/Comments:__________________________________________________________________________________________________________________________

## 2019-07-17 LAB
ALBUMIN SERPL BCP-MCNC: 3.4 G/DL (ref 3.5–5.2)
ALP SERPL-CCNC: 60 U/L (ref 55–135)
ALT SERPL W/O P-5'-P-CCNC: 74 U/L (ref 10–44)
ANION GAP SERPL CALC-SCNC: 5 MMOL/L (ref 8–16)
AST SERPL-CCNC: 55 U/L (ref 10–40)
BASOPHILS # BLD AUTO: 0.02 K/UL (ref 0–0.2)
BASOPHILS NFR BLD: 0.2 % (ref 0–1.9)
BILIRUB SERPL-MCNC: 0.6 MG/DL (ref 0.1–1)
BUN SERPL-MCNC: 10 MG/DL (ref 6–20)
CALCIUM SERPL-MCNC: 8.4 MG/DL (ref 8.7–10.5)
CHLORIDE SERPL-SCNC: 101 MMOL/L (ref 95–110)
CO2 SERPL-SCNC: 27 MMOL/L (ref 23–29)
CREAT SERPL-MCNC: 0.9 MG/DL (ref 0.5–1.4)
DIFFERENTIAL METHOD: ABNORMAL
EOSINOPHIL # BLD AUTO: 0 K/UL (ref 0–0.5)
EOSINOPHIL NFR BLD: 0.1 % (ref 0–8)
ERYTHROCYTE [DISTWIDTH] IN BLOOD BY AUTOMATED COUNT: 14 % (ref 11.5–14.5)
EST. GFR  (AFRICAN AMERICAN): >60 ML/MIN/1.73 M^2
EST. GFR  (NON AFRICAN AMERICAN): >60 ML/MIN/1.73 M^2
GLUCOSE SERPL-MCNC: 148 MG/DL (ref 70–110)
HCT VFR BLD AUTO: 41.2 % (ref 40–54)
HGB BLD-MCNC: 13.3 G/DL (ref 14–18)
LYMPHOCYTES # BLD AUTO: 0.9 K/UL (ref 1–4.8)
LYMPHOCYTES NFR BLD: 7.8 % (ref 18–48)
MAGNESIUM SERPL-MCNC: 2.2 MG/DL (ref 1.6–2.6)
MCH RBC QN AUTO: 27 PG (ref 27–31)
MCHC RBC AUTO-ENTMCNC: 32.3 G/DL (ref 32–36)
MCV RBC AUTO: 84 FL (ref 82–98)
MONOCYTES # BLD AUTO: 0.6 K/UL (ref 0.3–1)
MONOCYTES NFR BLD: 5.5 % (ref 4–15)
NEUTROPHILS # BLD AUTO: 9.7 K/UL (ref 1.8–7.7)
NEUTROPHILS NFR BLD: 86.4 % (ref 38–73)
PHOSPHATE SERPL-MCNC: 3.2 MG/DL (ref 2.7–4.5)
PLATELET # BLD AUTO: 235 K/UL (ref 150–350)
PMV BLD AUTO: 10.2 FL (ref 9.2–12.9)
POTASSIUM SERPL-SCNC: 4.4 MMOL/L (ref 3.5–5.1)
PROT SERPL-MCNC: 6.1 G/DL (ref 6–8.4)
RBC # BLD AUTO: 4.92 M/UL (ref 4.6–6.2)
SODIUM SERPL-SCNC: 133 MMOL/L (ref 136–145)
WBC # BLD AUTO: 11.27 K/UL (ref 3.9–12.7)

## 2019-07-17 PROCEDURE — 27000646 HC AEROBIKA DEVICE

## 2019-07-17 PROCEDURE — 94664 DEMO&/EVAL PT USE INHALER: CPT

## 2019-07-17 PROCEDURE — 94770 HC EXHALED C02 TEST: CPT

## 2019-07-17 PROCEDURE — 80053 COMPREHEN METABOLIC PANEL: CPT

## 2019-07-17 PROCEDURE — 63600175 PHARM REV CODE 636 W HCPCS: Performed by: STUDENT IN AN ORGANIZED HEALTH CARE EDUCATION/TRAINING PROGRAM

## 2019-07-17 PROCEDURE — 20000000 HC ICU ROOM

## 2019-07-17 PROCEDURE — 63600175 PHARM REV CODE 636 W HCPCS: Performed by: SURGERY

## 2019-07-17 PROCEDURE — 85025 COMPLETE CBC W/AUTO DIFF WBC: CPT

## 2019-07-17 PROCEDURE — 27000221 HC OXYGEN, UP TO 24 HOURS

## 2019-07-17 PROCEDURE — 94761 N-INVAS EAR/PLS OXIMETRY MLT: CPT

## 2019-07-17 PROCEDURE — 99900035 HC TECH TIME PER 15 MIN (STAT)

## 2019-07-17 PROCEDURE — 84100 ASSAY OF PHOSPHORUS: CPT

## 2019-07-17 PROCEDURE — 83735 ASSAY OF MAGNESIUM: CPT

## 2019-07-17 PROCEDURE — 25000003 PHARM REV CODE 250: Performed by: SURGERY

## 2019-07-17 PROCEDURE — 94799 UNLISTED PULMONARY SVC/PX: CPT

## 2019-07-17 RX ORDER — ACETAMINOPHEN 10 MG/ML
1000 INJECTION, SOLUTION INTRAVENOUS EVERY 8 HOURS
Status: COMPLETED | OUTPATIENT
Start: 2019-07-17 | End: 2019-07-18

## 2019-07-17 RX ADMIN — KETOROLAC TROMETHAMINE 10 MG: 30 INJECTION, SOLUTION INTRAMUSCULAR at 12:07

## 2019-07-17 RX ADMIN — ACETAMINOPHEN 1000 MG: 10 INJECTION, SOLUTION INTRAVENOUS at 09:07

## 2019-07-17 RX ADMIN — PREGABALIN 75 MG: 75 CAPSULE ORAL at 09:07

## 2019-07-17 RX ADMIN — Medication: at 03:07

## 2019-07-17 RX ADMIN — KETOROLAC TROMETHAMINE 10 MG: 30 INJECTION, SOLUTION INTRAMUSCULAR at 06:07

## 2019-07-17 RX ADMIN — AMPICILLIN SODIUM AND SULBACTAM SODIUM 3 G: 2; 1 INJECTION, POWDER, FOR SOLUTION INTRAMUSCULAR; INTRAVENOUS at 12:07

## 2019-07-17 RX ADMIN — DEXTROSE, SODIUM CHLORIDE, AND POTASSIUM CHLORIDE: 5; .45; .15 INJECTION INTRAVENOUS at 06:07

## 2019-07-17 RX ADMIN — IRON SUCROSE 100 MG: 20 INJECTION, SOLUTION INTRAVENOUS at 09:07

## 2019-07-17 RX ADMIN — ONDANSETRON 4 MG: 2 INJECTION INTRAMUSCULAR; INTRAVENOUS at 02:07

## 2019-07-17 RX ADMIN — AMPICILLIN SODIUM AND SULBACTAM SODIUM 3 G: 2; 1 INJECTION, POWDER, FOR SOLUTION INTRAMUSCULAR; INTRAVENOUS at 06:07

## 2019-07-17 RX ADMIN — AMPICILLIN SODIUM AND SULBACTAM SODIUM 3 G: 2; 1 INJECTION, POWDER, FOR SOLUTION INTRAMUSCULAR; INTRAVENOUS at 07:07

## 2019-07-17 RX ADMIN — Medication: at 02:07

## 2019-07-17 RX ADMIN — ACETAMINOPHEN 1000 MG: 10 INJECTION, SOLUTION INTRAVENOUS at 05:07

## 2019-07-17 RX ADMIN — DEXTROSE, SODIUM CHLORIDE, AND POTASSIUM CHLORIDE: 5; .45; .15 INJECTION INTRAVENOUS at 12:07

## 2019-07-17 RX ADMIN — ACETAMINOPHEN 1000 MG: 10 INJECTION, SOLUTION INTRAVENOUS at 02:07

## 2019-07-17 RX ADMIN — DEXTROSE, SODIUM CHLORIDE, AND POTASSIUM CHLORIDE: 5; .45; .15 INJECTION INTRAVENOUS at 09:07

## 2019-07-17 NOTE — NURSING
Notified Dr. Walker that the patient had become restless and is c/o 10/10 abdominal pain. Will see the patient per Dr. Walker.

## 2019-07-17 NOTE — PLAN OF CARE
Problem: Adult Inpatient Plan of Care  Goal: Plan of Care Review  Outcome: Ongoing (interventions implemented as appropriate)  Patient is new to the ICU and S/P Surgical Procedure. Patient is oriented to the Unit and informed on the POC. He appears to be comfortable, since surgery. All monitors are set and IVF's are observed and compared to the orders. Call bell is within reach.

## 2019-07-17 NOTE — NURSING
Notified Dr. Walker that the patient is still c/o abdominal pain. No new orders received. Will continue to monitor.

## 2019-07-17 NOTE — CONSULTS
U Infectious Disease Consult     Primary Team: Surgery  Consultant Attending: Marlyn  Date of Admit: 7/16/2019    Reason for Consult     Positive PPD, negative workup and quantiferon; is INH needed?    History of Present Illness   Aubrey Gallagher is a 44 y.o. male with a recent mesenteric mass found on CT scan and positive uptake in small bowel and mesentery on gallium scan. The patient presented to [facility] on 7/16/2019 for exploratory laparotomy, bowel resection (140 cm), mesenteric lymphadenopathy, cholecystectomy, and appendectomy.     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 He reports that all prior PPD's have been negative. On 6/4, he was seen by ID physician Dr. Shen Leger who reported that his TB induration was >11 mm but that chest x-ray showed no cavitary lesions or adenopathy. Patient denied any night sweats, fever, chills, cough, unintentional weight loss, or SOB at this time. She recommended 9 months of INH with B6 therapy for latent TB.     A quantiferon was then performed on 7/8 that was negative and the patient decided to refrain from INH therapy until after surgery.     Review of Systems (positives in bold):  Constitutional: wt loss, fever, chills, night sweats, fatigue, malaise  HEENT: otalgia, rhinorrhea, sore throat  Urogenital: frequency, dysuria, discharge  Neurological: headaches, syncope, weakness Gastrointestinal: nausea, vomiting, melena, hematochezia, abdominal pain, diarrhea, constipation  Respiratory: cough, sputum production, wheeze  Integumentary:  rash, lesions  Cardiovascular: chest pain, edema  Hematological: bleeding, bruising, lymphadenopathy  Musculoskeletal: arthralgia, myalgia, joint swelling, stiffness, back pain    Allergies:  Review of patient's allergies indicates:   Allergen Reactions    Epinephrine      Neuroendocrine Tumor patient      Ciprofloxacin Other (See Comments)     Cramping       Medications:   In-Hospital Scheduled Medications:   acetaminophen  1,000 mg Intravenous Q8H    acetaminophen  1,000 mg Intravenous Q8H    ampicillin-sulbactim (UNASYN) IVPB  3 g Intravenous Q6H    iron sucrose (VENOFER) IVPB  100 mg Intravenous Daily    isosulfan blue  5 mg Subcutaneous Once    ketorolac  30 mg Intravenous Once    ketorolac  9.999 mg Intravenous Q6H    pregabalin  75 mg Oral BID      In-Hospital PRN Medications:  albuterol sulfate, diphenhydrAMINE, hydrALAZINE, labetalol, naloxone, ondansetron   In-Hospital IV Infusion Medications:   dextrose 5 % and 0.45 % NaCl with KCl 20 mEq 100 mL/hr at 19 0947    hydromorphone in 0.9 % NaCl 6 mg/30 ml      octreotide infusion (50 mcg/mL)       Antibiotics and Day Number of Therapy:  Ampicillin/sulbactam:  - current    Past Medical History:  Past Medical History:   Diagnosis Date    Mesenteric mass      Past Surgical History/ObGyn Hx if gender appropriate:  Past Surgical History:   Procedure Laterality Date    APPENDECTOMY  2019    [facility]    CHOLECYSTECTOMY  2019    [facility]    COLONOSCOPY      EXCISION, SMALL INTESTINE N/A 2019    [facility]    LAPAROTOMY, EXPLORATORY  2019    [facility]    LYMPHADENECTOMY N/A 2019    [facility]     Family History:  History reviewed. No pertinent family history.  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[employer]; prior service in the [employer] but with no overseas positions    Travel- cruises to [place] [facility] [place] and [facility] [place]    Objective   Last 24 Hour Vital Signs:  BP  Min: 103/70  Max: 160/99  Temp  Av.2 °F (36.8 °C)   "Min: 97.7 °F (36.5 °C)  Max: 98.9 °F (37.2 °C)  Pulse  Av.8  Min: 53  Max: 91  Resp  Avg: 15.1  Min: 6  Max: 39  SpO2  Av.2 %  Min: 89 %  Max: 100 %  Height  Av' 9" (175.3 cm)  Min: 5' 9" (175.3 cm)  Max: 5' 9" (175.3 cm)  Weight  Av.4 kg (250 lb)  Min: 113.4 kg (250 lb)  Max: 113.4 kg (250 lb)    Lines, Drains, Airway:  Peripheral IV   Urethral catheter ()  NG/OG Tube ()    Physical Examination:  Examination  General: Patient sitting in chair slightly uncomfortable due to surgery in NAD  HEENT: normocephalic, atraumatic; with NG tube in place.  Cardiac: RRR, no murmurs appreciated, no extra heart sounds  Pulmonary/Chest: CTAB, symmetric chest rise, no wheezing or crackles  GI: Soft, tender due to post-op, non distended, normoactive bowel sounds  Extremities: no edema, clubbing, or cyanosis  Skin: dry, warm, intact. No bruising or rashes.  Neuro: Alert and oriented    Laboratory:  CBC:   Lab Results   Component Value Date    WBC 11.27 2019    HGB 13.3 (L) 2019     2019    MCV 84 2019    RDW 14.0 2019       Estimated Creatinine Clearance: 127.4 mL/min (based on SCr of 0.9 mg/dL).    Other Lab data   Quantiferon (): negative  PPD: positive with >11 mm induration  HIV: negative    Assessment     Aubrey Gallagher is a 44 y.o. male with a neuroendocrine tumor post op exploratory laparotomy, bowel resection, mesenteric lymphadenopathy, cholecystectomy, and appendectomy who had a positive PPD.     Recommendations     Latent Tuberculosis  - Recommend starting INH for 9 months with B6 therapy  - Patient wishes to revisit this discussion a few days after recovering from surgery  - Need to find out patients primary care and plan for follow up    Thank you for the consult. Please call with any questions.    Diane Damon MD  U Internal Medicine Resident, [facility]      "

## 2019-07-17 NOTE — PROGRESS NOTES
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    INTERVAL HISTORY  No acute events  Difficulty with pain control immediately post op, improved with increase in PCA dose and addition of IV Tylenol.  NPO with NGT in place.  Turcios in place with good UOP.    Temp:  [97.7 °F (36.5 °C)-98.9 °F (37.2 °C)] 97.7 °F (36.5 °C)  Pulse:  [56-91] 56  Resp:  [6-39] 9  SpO2:  [89 %-100 %] 95 %  BP: (108-160)/(54-99) 108/66  Arterial Line BP: ()/(66-84) 91/71    I/O last 3 completed shifts:  In: 960 [I.V.:560; IV Piggyback:400]  Out: 2805 [Urine:2805]    EXAM  GEN: A&Ox3, NAD  HEENT: NGT in place, minimal output  CV: RRR  RESP: Non-labored breathing on 2L NC O2  ABD: Soft, ND, appropriately TTP.  INCISIONS/DRAINS: Midline laparotomy incision with Dermabond, well-approximated, no swelling/drainage/erythema.    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 Currently on IV Tylenol and Dilaudid PCA. Can add Robaxin if patient needs additional modality.   RESP: Encourage IS, deep breathing, cough.  CV: HDS. Remove A-line.  RENAL: Good UOP, Cr wnl. Remove Turcios today.  FEN/GI: NPO with NGT in place. Await return of bowel function. Replace lytes PRN.  HEME: H/H stable post op. Trend daily.  ID: Afebrile, no leukocytosis. Unasyn x 6 doses for surgical prophylaxis.   ENDO: Octreotide gtt. Will wean over the next couple of days.  MSK: PT/OT. OOB today.    PPX: SCDs. Start  chemical PPX today.  DISPO: Likely transfer to floor today pending discussion with staff.    Tammy Nowak MD  [employer] General Surgery PGY-4

## 2019-07-17 NOTE — NURSING
The patient was assisted out of bed and to the chair. The patient c/o nausea at the time. Vital signs remained stable. Will continue to monitor.

## 2019-07-17 NOTE — NURSING
Notified Dr. Bustillo that the patient's UOP has decreased since overnight. Overnight output was 120-160cc/ hr. His current UOP is 30-50 cc/ hour. Will continue to monitor per Dr. Bustillo.

## 2019-07-17 NOTE — ANESTHESIA POSTPROCEDURE EVALUATION
Anesthesia Post Evaluation    Patient: Aubrey Gallagher    Procedure(s) Performed: Procedure(s) (LRB):  LYMPHADENECTOMY (N/A)  EXCISION, SMALL INTESTINE (N/A)  APPENDECTOMY  CHOLECYSTECTOMY  LAPAROTOMY, EXPLORATORY    Final Anesthesia Type: general  Patient location during evaluation: ICU  Patient participation: Yes- Able to Participate  Level of consciousness: awake and alert, oriented and awake  Post-procedure vital signs: reviewed and stable  Pain management: adequate  Airway patency: patent  PONV status at discharge: No PONV  Anesthetic complications: no      Cardiovascular status: blood pressure returned to baseline  Respiratory status: unassisted and room air  Hydration status: euvolemic  Follow-up not needed.          Vitals Value Taken Time   /71 7/17/2019  2:13 PM   Temp 36.7 °C (98 °F) 7/17/2019 11:03 AM   Pulse 59 7/17/2019  2:20 PM   Resp 16 7/17/2019  2:20 PM   SpO2 98 % 7/17/2019  2:20 PM   Vitals shown include unvalidated device data.      No case tracking events are documented in the log.      Pain/Rossy Score: Pain Rating Prior to Med Admin: 5 (7/17/2019  2:12 PM)

## 2019-07-17 NOTE — NURSING
Notfied  that the patient is c/o abdominal pain 8/10 despite Dilaudid IV push being administered and PCA pump. Will review pain medications and pt chart.

## 2019-07-17 NOTE — NURSING
Spoke with Dr. Carlos about Mr. Gallagher's increasing abdominal pain over the last hour. Will order 30 mg Toradol once per Dr. Carlos.

## 2019-07-18 LAB
ALBUMIN SERPL BCP-MCNC: 3.1 G/DL (ref 3.5–5.2)
ALP SERPL-CCNC: 54 U/L (ref 55–135)
ALT SERPL W/O P-5'-P-CCNC: 58 U/L (ref 10–44)
ANION GAP SERPL CALC-SCNC: 5 MMOL/L (ref 8–16)
AST SERPL-CCNC: 42 U/L (ref 10–40)
BASOPHILS # BLD AUTO: 0.01 K/UL (ref 0–0.2)
BASOPHILS NFR BLD: 0.1 % (ref 0–1.9)
BILIRUB SERPL-MCNC: 0.5 MG/DL (ref 0.1–1)
BUN SERPL-MCNC: 7 MG/DL (ref 6–20)
CALCIUM SERPL-MCNC: 8.6 MG/DL (ref 8.7–10.5)
CHLORIDE SERPL-SCNC: 101 MMOL/L (ref 95–110)
CO2 SERPL-SCNC: 31 MMOL/L (ref 23–29)
CREAT SERPL-MCNC: 0.8 MG/DL (ref 0.5–1.4)
DIFFERENTIAL METHOD: ABNORMAL
EOSINOPHIL # BLD AUTO: 0.1 K/UL (ref 0–0.5)
EOSINOPHIL NFR BLD: 0.6 % (ref 0–8)
ERYTHROCYTE [DISTWIDTH] IN BLOOD BY AUTOMATED COUNT: 13.8 % (ref 11.5–14.5)
EST. GFR  (AFRICAN AMERICAN): >60 ML/MIN/1.73 M^2
EST. GFR  (NON AFRICAN AMERICAN): >60 ML/MIN/1.73 M^2
GLUCOSE SERPL-MCNC: 146 MG/DL (ref 70–110)
HCT VFR BLD AUTO: 39.8 % (ref 40–54)
HGB BLD-MCNC: 13 G/DL (ref 14–18)
LYMPHOCYTES # BLD AUTO: 1.1 K/UL (ref 1–4.8)
LYMPHOCYTES NFR BLD: 9.7 % (ref 18–48)
MAGNESIUM SERPL-MCNC: 2.1 MG/DL (ref 1.6–2.6)
MCH RBC QN AUTO: 27.7 PG (ref 27–31)
MCHC RBC AUTO-ENTMCNC: 32.7 G/DL (ref 32–36)
MCV RBC AUTO: 85 FL (ref 82–98)
MONOCYTES # BLD AUTO: 0.9 K/UL (ref 0.3–1)
MONOCYTES NFR BLD: 8.4 % (ref 4–15)
NEUTROPHILS # BLD AUTO: 8.8 K/UL (ref 1.8–7.7)
NEUTROPHILS NFR BLD: 81.2 % (ref 38–73)
PHOSPHATE SERPL-MCNC: 2 MG/DL (ref 2.7–4.5)
PLATELET # BLD AUTO: 208 K/UL (ref 150–350)
PMV BLD AUTO: 9.6 FL (ref 9.2–12.9)
POTASSIUM SERPL-SCNC: 4.2 MMOL/L (ref 3.5–5.1)
PROT SERPL-MCNC: 6.3 G/DL (ref 6–8.4)
RBC # BLD AUTO: 4.7 M/UL (ref 4.6–6.2)
SODIUM SERPL-SCNC: 137 MMOL/L (ref 136–145)
WBC # BLD AUTO: 10.87 K/UL (ref 3.9–12.7)

## 2019-07-18 PROCEDURE — 25000003 PHARM REV CODE 250: Performed by: STUDENT IN AN ORGANIZED HEALTH CARE EDUCATION/TRAINING PROGRAM

## 2019-07-18 PROCEDURE — 93010 ELECTROCARDIOGRAM REPORT: CPT | Mod: ,,, | Performed by: INTERNAL MEDICINE

## 2019-07-18 PROCEDURE — 94770 HC EXHALED C02 TEST: CPT

## 2019-07-18 PROCEDURE — 25000003 PHARM REV CODE 250: Performed by: SURGERY

## 2019-07-18 PROCEDURE — 80053 COMPREHEN METABOLIC PANEL: CPT

## 2019-07-18 PROCEDURE — 93010 EKG 12-LEAD: ICD-10-PCS | Mod: ,,, | Performed by: INTERNAL MEDICINE

## 2019-07-18 PROCEDURE — 63600175 PHARM REV CODE 636 W HCPCS: Performed by: STUDENT IN AN ORGANIZED HEALTH CARE EDUCATION/TRAINING PROGRAM

## 2019-07-18 PROCEDURE — 27000221 HC OXYGEN, UP TO 24 HOURS

## 2019-07-18 PROCEDURE — 94761 N-INVAS EAR/PLS OXIMETRY MLT: CPT

## 2019-07-18 PROCEDURE — 83735 ASSAY OF MAGNESIUM: CPT

## 2019-07-18 PROCEDURE — 94664 DEMO&/EVAL PT USE INHALER: CPT

## 2019-07-18 PROCEDURE — 99900035 HC TECH TIME PER 15 MIN (STAT)

## 2019-07-18 PROCEDURE — 84100 ASSAY OF PHOSPHORUS: CPT

## 2019-07-18 PROCEDURE — 94799 UNLISTED PULMONARY SVC/PX: CPT

## 2019-07-18 PROCEDURE — 11000001 HC ACUTE MED/SURG PRIVATE ROOM

## 2019-07-18 PROCEDURE — 63600175 PHARM REV CODE 636 W HCPCS: Performed by: SURGERY

## 2019-07-18 PROCEDURE — 93005 ELECTROCARDIOGRAM TRACING: CPT

## 2019-07-18 PROCEDURE — 85025 COMPLETE CBC W/AUTO DIFF WBC: CPT

## 2019-07-18 RX ORDER — METHOCARBAMOL 500 MG/1
500 TABLET, FILM COATED ORAL 4 TIMES DAILY
Status: DISCONTINUED | OUTPATIENT
Start: 2019-07-18 | End: 2019-07-22

## 2019-07-18 RX ORDER — METHOCARBAMOL 500 MG/1
500 TABLET, FILM COATED ORAL 4 TIMES DAILY
Status: DISCONTINUED | OUTPATIENT
Start: 2019-07-18 | End: 2019-07-18

## 2019-07-18 RX ORDER — HYDRALAZINE HYDROCHLORIDE 20 MG/ML
5 INJECTION INTRAMUSCULAR; INTRAVENOUS
Status: DISCONTINUED | OUTPATIENT
Start: 2019-07-18 | End: 2019-07-23 | Stop reason: HOSPADM

## 2019-07-18 RX ORDER — HYDRALAZINE HYDROCHLORIDE 20 MG/ML
10 INJECTION INTRAMUSCULAR; INTRAVENOUS
Status: DISCONTINUED | OUTPATIENT
Start: 2019-07-18 | End: 2019-07-18

## 2019-07-18 RX ORDER — ENOXAPARIN SODIUM 100 MG/ML
40 INJECTION SUBCUTANEOUS
Status: DISCONTINUED | OUTPATIENT
Start: 2019-07-18 | End: 2019-07-23 | Stop reason: HOSPADM

## 2019-07-18 RX ADMIN — DEXTROSE, SODIUM CHLORIDE, AND POTASSIUM CHLORIDE: 5; .45; .15 INJECTION INTRAVENOUS at 03:07

## 2019-07-18 RX ADMIN — PREGABALIN 75 MG: 75 CAPSULE ORAL at 10:07

## 2019-07-18 RX ADMIN — IRON SUCROSE 100 MG: 20 INJECTION, SOLUTION INTRAVENOUS at 08:07

## 2019-07-18 RX ADMIN — METHOCARBAMOL TABLETS 500 MG: 500 TABLET, COATED ORAL at 10:07

## 2019-07-18 RX ADMIN — Medication: at 12:07

## 2019-07-18 RX ADMIN — METHOCARBAMOL TABLETS 500 MG: 500 TABLET, COATED ORAL at 01:07

## 2019-07-18 RX ADMIN — ACETAMINOPHEN 1000 MG: 10 INJECTION, SOLUTION INTRAVENOUS at 05:07

## 2019-07-18 RX ADMIN — METHOCARBAMOL TABLETS 500 MG: 500 TABLET, COATED ORAL at 12:07

## 2019-07-18 RX ADMIN — PREGABALIN 75 MG: 75 CAPSULE ORAL at 08:07

## 2019-07-18 RX ADMIN — DEXTROSE, SODIUM CHLORIDE, AND POTASSIUM CHLORIDE: 5; .45; .15 INJECTION INTRAVENOUS at 12:07

## 2019-07-18 RX ADMIN — AMPICILLIN SODIUM AND SULBACTAM SODIUM 3 G: 2; 1 INJECTION, POWDER, FOR SOLUTION INTRAMUSCULAR; INTRAVENOUS at 12:07

## 2019-07-18 RX ADMIN — OCTREOTIDE ACETATE 125 MCG/HR: 1000 INJECTION, SOLUTION INTRAVENOUS; SUBCUTANEOUS at 09:07

## 2019-07-18 RX ADMIN — KETOROLAC TROMETHAMINE 10 MG: 30 INJECTION, SOLUTION INTRAMUSCULAR at 12:07

## 2019-07-18 RX ADMIN — ENOXAPARIN SODIUM 40 MG: 100 INJECTION SUBCUTANEOUS at 06:07

## 2019-07-18 RX ADMIN — METHOCARBAMOL TABLETS 500 MG: 500 TABLET, COATED ORAL at 08:07

## 2019-07-18 RX ADMIN — KETOROLAC TROMETHAMINE 10 MG: 30 INJECTION, SOLUTION INTRAMUSCULAR at 05:07

## 2019-07-18 RX ADMIN — ACETAMINOPHEN 1000 MG: 10 INJECTION, SOLUTION INTRAVENOUS at 03:07

## 2019-07-18 RX ADMIN — DEXTROSE, SODIUM CHLORIDE, AND POTASSIUM CHLORIDE: 5; .45; .15 INJECTION INTRAVENOUS at 10:07

## 2019-07-18 RX ADMIN — KETOROLAC TROMETHAMINE 30 MG: 30 INJECTION, SOLUTION INTRAMUSCULAR at 12:07

## 2019-07-18 RX ADMIN — SODIUM PHOSPHATE, MONOBASIC, MONOHYDRATE 20.01 MMOL: 276; 142 INJECTION, SOLUTION INTRAVENOUS at 09:07

## 2019-07-18 RX ADMIN — METHOCARBAMOL TABLETS 500 MG: 500 TABLET, COATED ORAL at 06:07

## 2019-07-18 NOTE — PROGRESS NOTES
Patient arrived to floor from ICU.  PCA pump, Sky, and IVF infusing.  VSS.  Family at the bedside.  NG tube to LCWS.  Safety maintained.  Will continue to monitor.

## 2019-07-18 NOTE — PROGRESS NOTES
Patient is resting with family at bedside, he is without any discomfort. POC discussed with patient, he verbalized understanding. Patient is reminded of the frequency of PCA usage, he verbalized understanding. His wife has the PCA button on her side of the bed. The button is placed on the left side of the bed and secured. Call bell within reach.

## 2019-07-18 NOTE — PROGRESS NOTES
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    INTERVAL HISTORY  No acute events  Bradycardia overnight with sinus lucita EKG, appears to be at baseline vs prior  Pain well controlled.  NPO with NGT in place.  Out to chair yesterday.  Mckeon out, walt out. Voided spontaneously after mckeon removal    Temp:  [97.7 °F (36.5 °C)-98.7 °F (37.1 °C)] 98.7 °F (37.1 °C)  Pulse:  [46-66] 56  Resp:  [10-46] 22  SpO2:  [95 %-100 %] 99 %  BP: (137-181)/(70-97) 143/83    I/O last 3 completed shifts:  In: 515 [I.V.:415; IV Piggyback:100]  Out: 4425 [Urine:3975; Drains:450]    EXAM  GEN: A&Ox3, NAD  HEENT: NGT in place, output 450  CV: RRR  RESP: Non-labored breathing on 2L NC O2  ABD: Soft, ND, appropriately TTP.  INCISIONS/DRAINS: Midline laparotomy incision with Dermabond, well-approximated, no swelling/drainage/erythema.    LABS  All labs reviewed  Recent Labs     07/16/19  1445 07/17/19  0423 07/18/19  0430   WBC 12.03 11.27 10.87   HGB 13.8* 13.3* 13.0*   HCT 41.4 41.2 39.8*    235 208     CMP  Recent Labs     07/16/19  1445  07/17/19  0423 07/18/19  0430     --  133* 137   K 4.1  --  4.4 4.2     --  101 101   CO2 23  --  27 31*   *  --  148* 146*   BUN 11  --  10 7   CREATININE 1.0  --  0.9 0.8   CALCIUM 8.6*  --  8.4* 8.6*   PROT  --    < > 6.1 6.3   ALBUMIN  --    < > 3.4* 3.1*   BILITOT  --    < > 0.6 0.5   ALKPHOS  --    < > 60 54*   AST  --    < > 55* 42*   ALT  --    < > 74* 58*   ANIONGAP 10  --  5* 5*   ESTGFRAFRICA >60  --  >60 >60   EGFRNONAA >60  --  >60 >60    < > = values in this interval not displayed.     Recent Labs     07/17/19  0423 07/18/19  0430   MG 2.2 2.1   PHOS 3.2 2.0*         IMAGING  No new imaging    ASSESSMENT/PLAN  44 year old male with  mesenteric mass on CT scan, uptake in small bowel and mesentery on gallium scan; POD 21 s/p exploratory laparotomy, small bowel resection (140 cm), mesenteric lymphadenectomy, cholecystectomy, and appendectomy  - Pain control. Currently on IV Tylenol, toradol, pregabalin, and Dilaudid PCA, added robaxin overnight. Will plan to transition off PCA tomorrow  - Encourage IS, deep breathing, cough.  - NPO with NGT in place. Await return of bowel function. Replace lytes PRN.  Will do NG clamp trial tomorrow   - Octreotide gtt. Will wean over the next couple of days.  - PT/OT. Ambulate in h alls  - Lovenox PPX    Luz Elena Bustillo MD  [employer] General Surgery PGY-2

## 2019-07-18 NOTE — PLAN OF CARE
VN cued into pt's room for introduction with pt's permission.  VN role explained and informed pt and wife that VN would be working with bedside nurse and rest of care team.  Pt lying supine with bed in low position and call bell near, wife at bedside.  Fall risk and bed alarm protocol education provided.  Instructed pt to call for assistance.  Pt aware and agreeable.  Allowed time for questions.  Pt denies pain, nausea, and anxiety.  No acute distress noted.  Will continue to be available as needed.

## 2019-07-18 NOTE — PROGRESS NOTES
MD Bergeron called and informed her on my concerns with the patient HR being in the 40's . This is a change the present earlier during day on yesterday and seem to have carried off and on this evening. New orders noted, patient and wife verbally informed.

## 2019-07-18 NOTE — PLAN OF CARE
Pt reports he lives with his spouse and was independent with all adls prior to admit. Spouse is able to provide help at home and transportation upon d/c.  Tn gave pt d/c brochure and card and encouraged to call for any needs/concerns.  Tn to follow for post op needs.     07/18/19 1229   Discharge Assessment   Assessment Type Discharge Planning Assessment   Confirmed/corrected address and phone number on facesheet? Yes   Assessment information obtained from? Patient   Expected Length of Stay (days) 5   Communicated expected length of stay with patient/caregiver yes   Prior to hospitilization cognitive status: Alert/Oriented   Prior to hospitalization functional status: Independent   Current cognitive status: Alert/Oriented   Current Functional Status: Independent   Lives With spouse   Able to Return to Prior Arrangements yes   Is patient able to care for self after discharge? Yes   Who are your caregiver(s) and their phone number(s)? Emerald (spouse) [phone]   Patient's perception of discharge disposition home or selfcare   Readmission Within the Last 30 Days no previous admission in last 30 days   Patient currently being followed by outpatient case management? No   Patient currently receives any other outside agency services? No   Equipment Currently Used at Home none   Do you have any problems affording any of your prescribed medications? No   Is the patient taking medications as prescribed? yes   Does the patient have transportation home? Yes   Transportation Anticipated family or friend will provide   Does the patient receive services at the [facility]? No   Discharge Plan A Home with family   Discharge Plan B Home Health   DME Needed Upon Discharge    (tbd)   Patient/Family in Agreement with Plan yes

## 2019-07-18 NOTE — PROGRESS NOTES
Patient received a full head to toe bed bath, that was given by his wife. She was given assistance is needed. All linen and gown is changed. Patient actively helped with the bath.

## 2019-07-18 NOTE — NURSING TRANSFER
Nursing Transfer Note      7/18/2019     Transfer To: rm [address]    Transfer via bed    Transfer with  to O2    Transported by MERARI Agrawal    Medicines sent: none    Chart send with patient: Yes    Notified: spouse    Patient reassessed at: 1550 7/18/2019    Upon arrival to floor: patient oriented to room, call bell in reach and bed in lowest position

## 2019-07-18 NOTE — PLAN OF CARE
Problem: Adult Inpatient Plan of Care  Goal: Plan of Care Review  Outcome: Ongoing (interventions implemented as appropriate)     07/17/19 1917   Plan of Care Review   Plan of Care Reviewed With patient;spouse     Mr. Gallagher's pain was more controlled today. Pain was reported less than a 5 most of the time. The patient was educated on using the PCA pump only when he's having pain and not scheduled every 15 mins. The patient was able to sit up to the chair today for about 2 hours. He reported some nausea when transferring. Vital signs were stable. His mcekon and arterial line were removed at 1630. Due to void by 2200. NG tube  cc. Sky, IVF, PCA, and antibiotics continued. Infectious disease was consulted. Plans are for the patient to be transferred to the floor.

## 2019-07-18 NOTE — PROGRESS NOTES
Patient wife have the PCA button on her side once again. Patient history is observed noted the demands/deliveries all match there is no excess demands noted.

## 2019-07-19 LAB
ALBUMIN SERPL BCP-MCNC: 3.1 G/DL (ref 3.5–5.2)
ALP SERPL-CCNC: 59 U/L (ref 55–135)
ALT SERPL W/O P-5'-P-CCNC: 45 U/L (ref 10–44)
ANION GAP SERPL CALC-SCNC: 5 MMOL/L (ref 8–16)
AST SERPL-CCNC: 37 U/L (ref 10–40)
BILIRUB SERPL-MCNC: 0.4 MG/DL (ref 0.1–1)
BUN SERPL-MCNC: 7 MG/DL (ref 6–20)
CALCIUM SERPL-MCNC: 9.1 MG/DL (ref 8.7–10.5)
CHLORIDE SERPL-SCNC: 101 MMOL/L (ref 95–110)
CO2 SERPL-SCNC: 34 MMOL/L (ref 23–29)
CREAT SERPL-MCNC: 0.9 MG/DL (ref 0.5–1.4)
EST. GFR  (AFRICAN AMERICAN): >60 ML/MIN/1.73 M^2
EST. GFR  (NON AFRICAN AMERICAN): >60 ML/MIN/1.73 M^2
GLUCOSE SERPL-MCNC: 129 MG/DL (ref 70–110)
MAGNESIUM SERPL-MCNC: 2 MG/DL (ref 1.6–2.6)
PHOSPHATE SERPL-MCNC: 2.1 MG/DL (ref 2.7–4.5)
POTASSIUM SERPL-SCNC: 4.1 MMOL/L (ref 3.5–5.1)
PROT SERPL-MCNC: 6.5 G/DL (ref 6–8.4)
SODIUM SERPL-SCNC: 140 MMOL/L (ref 136–145)

## 2019-07-19 PROCEDURE — 80053 COMPREHEN METABOLIC PANEL: CPT

## 2019-07-19 PROCEDURE — 99900035 HC TECH TIME PER 15 MIN (STAT)

## 2019-07-19 PROCEDURE — 85025 COMPLETE CBC W/AUTO DIFF WBC: CPT

## 2019-07-19 PROCEDURE — 83735 ASSAY OF MAGNESIUM: CPT

## 2019-07-19 PROCEDURE — 11000001 HC ACUTE MED/SURG PRIVATE ROOM

## 2019-07-19 PROCEDURE — 94761 N-INVAS EAR/PLS OXIMETRY MLT: CPT

## 2019-07-19 PROCEDURE — 25000003 PHARM REV CODE 250: Performed by: STUDENT IN AN ORGANIZED HEALTH CARE EDUCATION/TRAINING PROGRAM

## 2019-07-19 PROCEDURE — 94799 UNLISTED PULMONARY SVC/PX: CPT

## 2019-07-19 PROCEDURE — 97161 PT EVAL LOW COMPLEX 20 MIN: CPT

## 2019-07-19 PROCEDURE — 97165 OT EVAL LOW COMPLEX 30 MIN: CPT

## 2019-07-19 PROCEDURE — 84100 ASSAY OF PHOSPHORUS: CPT

## 2019-07-19 PROCEDURE — 63600175 PHARM REV CODE 636 W HCPCS: Performed by: STUDENT IN AN ORGANIZED HEALTH CARE EDUCATION/TRAINING PROGRAM

## 2019-07-19 PROCEDURE — 97530 THERAPEUTIC ACTIVITIES: CPT

## 2019-07-19 PROCEDURE — 94664 DEMO&/EVAL PT USE INHALER: CPT

## 2019-07-19 RX ORDER — TRAMADOL HYDROCHLORIDE 50 MG/1
50 TABLET ORAL EVERY 6 HOURS PRN
Status: DISCONTINUED | OUTPATIENT
Start: 2019-07-19 | End: 2019-07-23 | Stop reason: HOSPADM

## 2019-07-19 RX ORDER — TRAMADOL HYDROCHLORIDE 50 MG/1
100 TABLET ORAL EVERY 6 HOURS PRN
Status: DISCONTINUED | OUTPATIENT
Start: 2019-07-19 | End: 2019-07-23 | Stop reason: HOSPADM

## 2019-07-19 RX ADMIN — METHOCARBAMOL TABLETS 500 MG: 500 TABLET, COATED ORAL at 04:07

## 2019-07-19 RX ADMIN — TRAMADOL HYDROCHLORIDE 100 MG: 50 TABLET, FILM COATED ORAL at 11:07

## 2019-07-19 RX ADMIN — DEXTROSE, SODIUM CHLORIDE, AND POTASSIUM CHLORIDE: 5; .45; .15 INJECTION INTRAVENOUS at 09:07

## 2019-07-19 RX ADMIN — ENOXAPARIN SODIUM 40 MG: 100 INJECTION SUBCUTANEOUS at 04:07

## 2019-07-19 RX ADMIN — METHOCARBAMOL TABLETS 500 MG: 500 TABLET, COATED ORAL at 09:07

## 2019-07-19 RX ADMIN — DEXTROSE, SODIUM CHLORIDE, AND POTASSIUM CHLORIDE: 5; .45; .15 INJECTION INTRAVENOUS at 08:07

## 2019-07-19 RX ADMIN — IRON SUCROSE 100 MG: 20 INJECTION, SOLUTION INTRAVENOUS at 09:07

## 2019-07-19 RX ADMIN — SODIUM PHOSPHATE, MONOBASIC, MONOHYDRATE 20.01 MMOL: 276; 142 INJECTION, SOLUTION INTRAVENOUS at 11:07

## 2019-07-19 RX ADMIN — PREGABALIN 75 MG: 75 CAPSULE ORAL at 08:07

## 2019-07-19 RX ADMIN — METHOCARBAMOL TABLETS 500 MG: 500 TABLET, COATED ORAL at 08:07

## 2019-07-19 RX ADMIN — PREGABALIN 75 MG: 75 CAPSULE ORAL at 09:07

## 2019-07-19 RX ADMIN — TRAMADOL HYDROCHLORIDE 100 MG: 50 TABLET, FILM COATED ORAL at 04:07

## 2019-07-19 RX ADMIN — METHOCARBAMOL TABLETS 500 MG: 500 TABLET, COATED ORAL at 01:07

## 2019-07-19 NOTE — PLAN OF CARE
Problem: Occupational Therapy Goal  Goal: Occupational Therapy Goal  Goals to be met by: 8/19/19     Patient will increase functional independence with ADLs by performing:    UE Dressing with Modified [place].  LE Dressing with Modified [place].  Grooming while standing with Modified [place].  Toileting from toilet with Modified [place] for hygiene and clothing management.   Toilet transfer to toilet with Modified [place].  Upper extremity exercise program x10 reps per handout, with independence.    Outcome: Ongoing (interventions implemented as appropriate)  OT eval performed. Report to follow.     Rec home w/ family, poss  OT/PT.

## 2019-07-19 NOTE — PT/OT/SLP EVAL
Physical Therapy Evaluation    Patient Name:  Aubrey Gallagher   MRN:  [id number]    Recommendations:     Discharge Recommendations:  home   Discharge Equipment Recommendations: walker, rolling, cane, straight, other (see comments)(pending progress)   Barriers to discharge: None    Assessment:     Aubrey Gallagher is a 44 y.o. male admitted with a medical diagnosis of Malignant carcinoid tumor of ileum.  He presents with the following impairments/functional limitations:  pain, impaired functional mobilty, impaired self care skills, impaired endurance, impaired balance, gait instability, weakness, decreased ROM, impaired skin, edema . Patient able to ambulate with RW ~ 275 ft with SBA . Supine <> sit with min assist and verbal cues for log rolling.    Rehab Prognosis: Good; patient would benefit from acute skilled PT services to address these deficits and reach maximum level of function.    Recent Surgery: Procedure(s) (LRB):  LYMPHADENECTOMY (N/A)  EXCISION, SMALL INTESTINE (N/A)  APPENDECTOMY  CHOLECYSTECTOMY  LAPAROTOMY, EXPLORATORY 3 Days Post-Op    Plan:     During this hospitalization, patient to be seen 6 x/week to address the identified rehab impairments via gait training, therapeutic activities, therapeutic exercises and progress toward the following goals:    · Plan of Care Expires:  08/19/19    Subjective     Chief Complaint: pain with transitional mvts  Patient/Family Comments/goals: go home  Pain/Comfort:  · Pain Rating 1: 3/10  · Location - Side 1: Bilateral  · Location - Orientation 1: generalized  · Location 1: abdomen  · Pain Addressed 1: Reposition, Distraction, Pre-medicate for activity  · Pain Rating Post-Intervention 1: 3/10    Patients cultural, spiritual, Jew conflicts given the current situation:      Living Environment:  Lives with spouse 2 SH BR and bath upstair  Prior to admission, patients level of function was independent.  Equipment used at home: (pending progress).  DME owned  (not currently used): none.  Upon discharge, patient will have assistance from family.    Objective:     Communicated with primary nurse prior to session.  Patient found HOB elevated with PCA, NG tube, central line, SCD  upon PT entry to room.    General Precautions: Standard, fall   Orthopedic Precautions:N/A   Braces: N/A     Exams:  · RLE ROM: WFL  · RLE Strength: WFL  · LLE ROM: WFL  · LLE Strength: WFL    Functional Mobility:  · Bed Mobility:     · Supine to Sit: minimum assistance  · Transfers:     · Sit to Stand:  stand by assistance and contact guard assistance with rolling walker  · Gait: 230 ft with RW and SBA  · Balance: fair with RW      Therapeutic Activities and Exercises:   Reviewed AROM BLE seated in chair    AM-PAC 6 CLICK MOBILITY  Total Score:18     Patient left up in chair with all lines intact, call button in reach and O T  present.    GOALS:   Multidisciplinary Problems     Physical Therapy Goals        Problem: Physical Therapy Goal    Goal Priority Disciplines Outcome Goal Variances Interventions   Physical Therapy Goal     PT, PT/OT Ongoing (interventions implemented as appropriate)     Description:  Goals to be met by: 2019     Patient will increase functional independence with mobility by performin. Supine to sit with Modified [place]  2. Sit to stand transfer with Modified [place]  3. Gait  x >200 feet with Modified [place] using no AD.                       History:     Past Medical History:   Diagnosis Date    Mesenteric mass        Past Surgical History:   Procedure Laterality Date    APPENDECTOMY  2019    [facility]    CHOLECYSTECTOMY  2019    [facility]    COLONOSCOPY      EXCISION, SMALL INTESTINE N/A 2019    [facility]    LAPAROTOMY, EXPLORATORY  2019    [facility]     LYMPHADENECTOMY N/A 7/16/2019    [facility]       Time Tracking:     PT Received On: 07/19/19  PT Start Time: 1105     PT Stop Time: 1128  PT Total Time (min): 23 min     Billable Minutes: Evaluation 23      Dakota Silva, PT  07/19/2019

## 2019-07-19 NOTE — PROGRESS NOTES
NG tubve clamped at 0930, residual four hours later was 100cc at 1330, will check residual again at 1730.

## 2019-07-19 NOTE — PROGRESS NOTES
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    INTERVAL HISTORY  NAEO. AFVSS  Pain well controlled  Denies n/v  No f/c, cp/sob  No flatus/BM yet   + ambulating/voiding    Temp:  [97.7 °F (36.5 °C)-98.7 °F (37.1 °C)] 98.5 °F (36.9 °C)  Pulse:  [49-68] 51  Resp:  [12-22] 18  SpO2:  [97 %-99 %] 98 %  BP: (129-181)/(68-89) 129/68    I/O last 3 completed shifts:  In: 4050 [I.V.:3200; NG/GT:200; IV Piggyback:650]  Out: 7025 [Urine:5725; Drains:1300]    EXAM  GEN: A&Ox3, NAD  HEENT: NGT in place, output 400 overnight, light bilious  CV: RRR  RESP: Non-labored breathing on room air   ABD: Soft, full, appropriately TTP.  INCISIONS/DRAINS: Midline laparotomy incision with Dermabond, well-approximated, no swelling/drainage/erythema.    LABS    All labs reviewed    Recent Labs     07/17/19 0423 07/18/19  0430 07/19/19  0550   WBC 11.27 10.87 9.26   HGB 13.3* 13.0* 13.1*   HCT 41.2 39.8* 40.1    208 208     CMP  Recent Labs     07/17/19  0423 07/18/19  0430 07/19/19  0550   * 137 140   K 4.4 4.2 4.1    101 101   CO2 27 31* 34*   * 146* 129*   BUN 10 7 7   CREATININE 0.9 0.8 0.9   CALCIUM 8.4* 8.6* 9.1   PROT 6.1 6.3 6.5   ALBUMIN 3.4* 3.1* 3.1*   BILITOT 0.6 0.5 0.4   ALKPHOS 60 54* 59   AST 55* 42* 37   ALT 74* 58* 45*   ANIONGAP 5* 5* 5*   ESTGFRAFRICA >60 >60 >60   EGFRNONAA >60 >60 >60     Recent Labs     07/17/19  0423 07/18/19  0430 07/19/19  0550   MG 2.2 2.1 2.0   PHOS 3.2 2.0* 2.1*         IMAGING  No new imaging    ASSESSMENT/PLAN  44 year old male with mesenteric mass on CT scan, uptake in small bowel and mesentery on gallium scan; POD 3 s/p exploratory laparotomy, small bowel resection (140 cm), mesenteric lymphadenectomy, cholecystectomy, and appendectomy  - Will  transition off PCA today   - Encourage IS, deep breathing, cough.  - Replete lytes PRN   - NPO with NGT in place. Await return of bowel function. Replace lytes PRN.  NG clamp today, check residuals q4h.    - Wean octreotide gtt  - PT/OT. Ambulate in [facility] PPX    Luz Elena Bustillo MD  [employer] General Surgery PGY-2

## 2019-07-19 NOTE — PLAN OF CARE
Problem: Adult Inpatient Plan of Care  Goal: Plan of Care Review  Outcome: Ongoing (interventions implemented as appropriate)  Pt vitals were maintained, pt was sleep most of the night and didn't complain of any pain. Pt NGT put out 200 at beginning of shift which was a greenish color. Pt wound is clean , dry and intact with no drainage. Will continue to monitor

## 2019-07-19 NOTE — PT/OT/SLP EVAL
Occupational Therapy   Evaluation    Name: Aubrey Gallagher  MRN: [id number]  Admitting Diagnosis:  Malignant carcinoid tumor of ileum 3 Days Post-Op    Recommendations:     Discharge Recommendations: home with home health  Discharge Equipment Recommendations:     Barriers to discharge:  None    Assessment:     Aubrey Gallagher is a 44 y.o. male with a medical diagnosis of Malignant carcinoid tumor of ileum.  He presents with performance deficits affecting function: weakness, impaired self care skills, impaired balance, impaired skin, edema, pain, impaired endurance, impaired functional mobilty, gait instability, impaired cardiopulmonary response to activity.      Rehab Prognosis: Good; patient would benefit from acute skilled OT services to address these deficits and reach maximum level of function.       Plan:     Patient to be seen 5 x/week to address the above listed problems via self-care/home management, therapeutic activities, therapeutic exercises  · Plan of Care Expires: 08/19/19  · Plan of Care Reviewed with: patient, mother    Subjective     Chief Complaint: Abdominal pain  Patient/Family Comments/goals: Return to [facility]    Occupational Profile:  Living Environment: Lives w/ spouse in [facility], 2 flights to [address] [place] [address] w/ B HR, T/S   Previous level of function: Independent in ADLs and mobility, stands to bathe   Roles and Routines: Independent in IADLs, 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  Patient found HOB elevated with bed alarm, peripheral IV, telemetry, SCD, oxygen upon OT entry to room.    General Precautions: Standard, NPO, fall   Orthopedic Precautions:N/A   Braces: N/A     Occupational Performance:    Bed Mobility:    · Patient completed Rolling/Turning to Right with supervision and stand by assistance  · Patient completed Scooting/Bridging with supervision and stand by assistance  · Patient completed Supine to Sit with minimum assistance    Functional Mobility/Transfers:  · Patient completed Sit <> Stand Transfer with contact guard assistance  with  rolling walker   · Patient completed Bed <> Chair Transfer using Step Transfer technique with stand by assistance and contact guard assistance with rolling walker  · Functional Mobility: Pt ambulated around RN station >100' w/ SBA and RW.  VCs given for upright posture and use of DME.     Activities of Daily Living:  · Toileting: stand by assistance to use urinal in standing    Cognitive/Visual Perceptual:  AO4    Physical Exam:  B UE AROM WFL.    Good-/Fair+ sit balance; Fair- stand balance.  Good activity tolerance.     AMPAC 6 Click ADL:  AMPAC Total Score: 16    Treatment & Education:  Pt educated on role of OT/POC and agreeable to tx.  Pt educated on proper positioning, posture, use of DME, as well as general safety and diaphragmatic breathing tech.  Pt educated on and demo'd HEP for NET and given written copy.    Education:    Patient left up in chair with all lines intact, call button in reach, nurse notified and family present    GOALS:   Multidisciplinary Problems     Occupational Therapy Goals        Problem: Occupational Therapy Goal    Goal Priority Disciplines Outcome Interventions   Occupational Therapy Goal     OT, PT/OT Ongoing (interventions implemented as appropriate)    Description:  Goals to be met by: 8/19/19     Patient will increase functional independence with ADLs by  performing:    UE Dressing with Modified [place].  LE Dressing with Modified [place].  Grooming while standing with Modified [place].  Toileting from toilet with Modified [place] for hygiene and clothing management.   Toilet transfer to toilet with Modified [place].  Upper extremity exercise program x10 reps per handout, with independence.                      History:     Past Medical History:   Diagnosis Date    Mesenteric mass        Past Surgical History:   Procedure Laterality Date    APPENDECTOMY  7/16/2019    [facility]    CHOLECYSTECTOMY  7/16/2019    [facility]    COLONOSCOPY      EXCISION, SMALL INTESTINE N/A 7/16/2019    [facility]    LAPAROTOMY, EXPLORATORY  7/16/2019    [facility]    LYMPHADENECTOMY N/A 7/16/2019    [facility] OR       Time Tracking:     OT Date of Treatment: 07/19/19  OT Start Time: 1103  OT Stop Time: 1140  OT Total Time (min): 37 min w/PT     Billable Minutes:Evaluation 15  Therapeutic Activity 8     RYANNE Sinclair  7/19/2019

## 2019-07-19 NOTE — PLAN OF CARE
Problem: Physical Therapy Goal  Goal: Physical Therapy Goal  Goals to be met by: 2019     Patient will increase functional independence with mobility by performin. Supine to sit with Modified [place]  2. Sit to stand transfer with Modified [place]  3. Gait  x >200 feet with Modified [place] using no AD.     Outcome: Ongoing (interventions implemented as appropriate)  Recommend Home   May need AD ? RW or SPC

## 2019-07-20 LAB
ALBUMIN SERPL BCP-MCNC: 3.2 G/DL (ref 3.5–5.2)
ALP SERPL-CCNC: 64 U/L (ref 55–135)
ALT SERPL W/O P-5'-P-CCNC: 42 U/L (ref 10–44)
ANION GAP SERPL CALC-SCNC: 10 MMOL/L (ref 8–16)
AST SERPL-CCNC: 30 U/L (ref 10–40)
BASOPHILS # BLD AUTO: 0.02 K/UL (ref 0–0.2)
BASOPHILS # BLD AUTO: 0.02 K/UL (ref 0–0.2)
BASOPHILS NFR BLD: 0.2 % (ref 0–1.9)
BASOPHILS NFR BLD: 0.2 % (ref 0–1.9)
BILIRUB SERPL-MCNC: 0.4 MG/DL (ref 0.1–1)
BUN SERPL-MCNC: 9 MG/DL (ref 6–20)
CALCIUM SERPL-MCNC: 9.6 MG/DL (ref 8.7–10.5)
CHLORIDE SERPL-SCNC: 102 MMOL/L (ref 95–110)
CO2 SERPL-SCNC: 27 MMOL/L (ref 23–29)
CREAT SERPL-MCNC: 0.8 MG/DL (ref 0.5–1.4)
DIFFERENTIAL METHOD: ABNORMAL
DIFFERENTIAL METHOD: ABNORMAL
EOSINOPHIL # BLD AUTO: 0.1 K/UL (ref 0–0.5)
EOSINOPHIL # BLD AUTO: 0.1 K/UL (ref 0–0.5)
EOSINOPHIL NFR BLD: 1.2 % (ref 0–8)
EOSINOPHIL NFR BLD: 1.3 % (ref 0–8)
ERYTHROCYTE [DISTWIDTH] IN BLOOD BY AUTOMATED COUNT: 13.5 % (ref 11.5–14.5)
ERYTHROCYTE [DISTWIDTH] IN BLOOD BY AUTOMATED COUNT: 13.8 % (ref 11.5–14.5)
EST. GFR  (AFRICAN AMERICAN): >60 ML/MIN/1.73 M^2
EST. GFR  (NON AFRICAN AMERICAN): >60 ML/MIN/1.73 M^2
GLUCOSE SERPL-MCNC: 120 MG/DL (ref 70–110)
HCT VFR BLD AUTO: 40.1 % (ref 40–54)
HCT VFR BLD AUTO: 42.3 % (ref 40–54)
HGB BLD-MCNC: 13.1 G/DL (ref 14–18)
HGB BLD-MCNC: 14 G/DL (ref 14–18)
LYMPHOCYTES # BLD AUTO: 1.1 K/UL (ref 1–4.8)
LYMPHOCYTES # BLD AUTO: 1.2 K/UL (ref 1–4.8)
LYMPHOCYTES NFR BLD: 12.3 % (ref 18–48)
LYMPHOCYTES NFR BLD: 12.9 % (ref 18–48)
MAGNESIUM SERPL-MCNC: 1.9 MG/DL (ref 1.6–2.6)
MCH RBC QN AUTO: 27.5 PG (ref 27–31)
MCH RBC QN AUTO: 27.5 PG (ref 27–31)
MCHC RBC AUTO-ENTMCNC: 32.7 G/DL (ref 32–36)
MCHC RBC AUTO-ENTMCNC: 33.1 G/DL (ref 32–36)
MCV RBC AUTO: 83 FL (ref 82–98)
MCV RBC AUTO: 84 FL (ref 82–98)
MONOCYTES # BLD AUTO: 0.7 K/UL (ref 0.3–1)
MONOCYTES # BLD AUTO: 0.8 K/UL (ref 0.3–1)
MONOCYTES NFR BLD: 7.3 % (ref 4–15)
MONOCYTES NFR BLD: 8.5 % (ref 4–15)
NEUTROPHILS # BLD AUTO: 7.3 K/UL (ref 1.8–7.7)
NEUTROPHILS # BLD AUTO: 7.3 K/UL (ref 1.8–7.7)
NEUTROPHILS NFR BLD: 77.2 % (ref 38–73)
NEUTROPHILS NFR BLD: 78.9 % (ref 38–73)
PHOSPHATE SERPL-MCNC: 2.9 MG/DL (ref 2.7–4.5)
PLATELET # BLD AUTO: 208 K/UL (ref 150–350)
PLATELET # BLD AUTO: 266 K/UL (ref 150–350)
PMV BLD AUTO: 9.6 FL (ref 9.2–12.9)
PMV BLD AUTO: 9.7 FL (ref 9.2–12.9)
POTASSIUM SERPL-SCNC: 3.9 MMOL/L (ref 3.5–5.1)
PROT SERPL-MCNC: 6.9 G/DL (ref 6–8.4)
RBC # BLD AUTO: 4.76 M/UL (ref 4.6–6.2)
RBC # BLD AUTO: 5.09 M/UL (ref 4.6–6.2)
SODIUM SERPL-SCNC: 139 MMOL/L (ref 136–145)
WBC # BLD AUTO: 9.26 K/UL (ref 3.9–12.7)
WBC # BLD AUTO: 9.55 K/UL (ref 3.9–12.7)

## 2019-07-20 PROCEDURE — 97116 GAIT TRAINING THERAPY: CPT

## 2019-07-20 PROCEDURE — 25000003 PHARM REV CODE 250: Performed by: STUDENT IN AN ORGANIZED HEALTH CARE EDUCATION/TRAINING PROGRAM

## 2019-07-20 PROCEDURE — 97110 THERAPEUTIC EXERCISES: CPT

## 2019-07-20 PROCEDURE — 83735 ASSAY OF MAGNESIUM: CPT

## 2019-07-20 PROCEDURE — 94761 N-INVAS EAR/PLS OXIMETRY MLT: CPT

## 2019-07-20 PROCEDURE — 85025 COMPLETE CBC W/AUTO DIFF WBC: CPT

## 2019-07-20 PROCEDURE — 97530 THERAPEUTIC ACTIVITIES: CPT

## 2019-07-20 PROCEDURE — 80053 COMPREHEN METABOLIC PANEL: CPT

## 2019-07-20 PROCEDURE — 94799 UNLISTED PULMONARY SVC/PX: CPT

## 2019-07-20 PROCEDURE — 99900035 HC TECH TIME PER 15 MIN (STAT)

## 2019-07-20 PROCEDURE — 11000001 HC ACUTE MED/SURG PRIVATE ROOM

## 2019-07-20 PROCEDURE — 63600175 PHARM REV CODE 636 W HCPCS: Performed by: STUDENT IN AN ORGANIZED HEALTH CARE EDUCATION/TRAINING PROGRAM

## 2019-07-20 PROCEDURE — 94664 DEMO&/EVAL PT USE INHALER: CPT

## 2019-07-20 PROCEDURE — 84100 ASSAY OF PHOSPHORUS: CPT

## 2019-07-20 RX ORDER — CALCIUM CARBONATE 200(500)MG
500 TABLET,CHEWABLE ORAL 3 TIMES DAILY PRN
Status: DISCONTINUED | OUTPATIENT
Start: 2019-07-20 | End: 2019-07-23 | Stop reason: HOSPADM

## 2019-07-20 RX ADMIN — METHOCARBAMOL TABLETS 500 MG: 500 TABLET, COATED ORAL at 03:07

## 2019-07-20 RX ADMIN — TRAMADOL HYDROCHLORIDE 100 MG: 50 TABLET, FILM COATED ORAL at 05:07

## 2019-07-20 RX ADMIN — CALCIUM CARBONATE (ANTACID) CHEW TAB 500 MG 500 MG: 500 CHEW TAB at 01:07

## 2019-07-20 RX ADMIN — METHOCARBAMOL TABLETS 500 MG: 500 TABLET, COATED ORAL at 09:07

## 2019-07-20 RX ADMIN — TRAMADOL HYDROCHLORIDE 100 MG: 50 TABLET, FILM COATED ORAL at 10:07

## 2019-07-20 RX ADMIN — CALCIUM CARBONATE (ANTACID) CHEW TAB 500 MG 500 MG: 500 CHEW TAB at 10:07

## 2019-07-20 RX ADMIN — PREGABALIN 75 MG: 75 CAPSULE ORAL at 09:07

## 2019-07-20 RX ADMIN — ENOXAPARIN SODIUM 40 MG: 100 INJECTION SUBCUTANEOUS at 05:07

## 2019-07-20 RX ADMIN — DEXTROSE, SODIUM CHLORIDE, AND POTASSIUM CHLORIDE: 5; .45; .15 INJECTION INTRAVENOUS at 03:07

## 2019-07-20 RX ADMIN — METHOCARBAMOL TABLETS 500 MG: 500 TABLET, COATED ORAL at 08:07

## 2019-07-20 RX ADMIN — CALCIUM CARBONATE (ANTACID) CHEW TAB 500 MG 500 MG: 500 CHEW TAB at 05:07

## 2019-07-20 RX ADMIN — PREGABALIN 75 MG: 75 CAPSULE ORAL at 08:07

## 2019-07-20 RX ADMIN — IRON SUCROSE 100 MG: 20 INJECTION, SOLUTION INTRAVENOUS at 09:07

## 2019-07-20 RX ADMIN — DEXTROSE, SODIUM CHLORIDE, AND POTASSIUM CHLORIDE: 5; .45; .15 INJECTION INTRAVENOUS at 06:07

## 2019-07-20 NOTE — PROGRESS NOTES
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    INTERVAL HISTORY  No acute events  Pain well controlled of PCA  NGT in place - residuals checked q4h and between 0 and 10 ml  No flatus or BM yet  OOB for the first time yesterday    Temp:  [98.2 °F (36.8 °C)-98.6 °F (37 °C)] 98.4 °F (36.9 °C)  Pulse:  [48-57] 50  Resp:  [16-20] 20  SpO2:  [94 %-98 %] 97 %  BP: (137-156)/(77-85) 137/81    I/O last 3 completed shifts:  In: 3081.7 [I.V.:2401.7; NG/GT:330; IV Piggyback:350]  Out: 5250 [Urine:4650; Drains:600]    EXAM  GEN: A&Ox3, NAD  HEENT: NGT in place, 200 ml out in 24h, lighter output  CV: RRR  RESP: Non-labored breathing on room air   ABD: Soft, mildly distended, appropriately TTP  INCISIONS/DRAINS: Midline laparotomy incision with Dermabond, well-approximated, no swelling/drainage/erythema.    LABS    All labs reviewed    Recent Labs     07/18/19  0430 07/19/19  0550 07/20/19  0504   WBC 10.87 9.26 9.55   HGB 13.0* 13.1* 14.0   HCT 39.8* 40.1 42.3    208 266     CMP  Recent Labs     07/18/19  0430 07/19/19  0550 07/20/19  0504    140 139   K 4.2 4.1 3.9    101 102   CO2 31* 34* 27   * 129* 120*   BUN 7 7 9   CREATININE 0.8 0.9 0.8   CALCIUM 8.6* 9.1 9.6   PROT 6.3 6.5 6.9   ALBUMIN 3.1* 3.1* 3.2*   BILITOT 0.5 0.4 0.4   ALKPHOS 54* 59 64   AST 42* 37 30   ALT 58* 45* 42   ANIONGAP 5* 5* 10   ESTGFRAFRICA >60 >60 >60   EGFRNONAA >60 >60 >60     Recent Labs     07/18/19  0430 07/19/19  0550 07/20/19  0504   MG 2.1 2.0 1.9   PHOS 2.0* 2.1* 2.9       IMAGING  No new imaging    ASSESSMENT/PLAN  44 year old male with mesenteric mass on CT scan, uptake in small bowel and mesentery on gallium scan; POD #4 s/p exploratory laparotomy, small bowel resection (140 cm),  mesenteric lymphadenectomy, cholecystectomy, and appendectomy  - Pain control  - Remove NGT - clear liquids later today if no nausea/vomiting. Start to wean IVF.   - Encourage IS, deep breathing, cough.    - PT/OT. Ambulate in halls    PPX: Keyur Nowak MD  [employer] General Surgery PGY-4

## 2019-07-20 NOTE — PLAN OF CARE
Problem: Adult Inpatient Plan of Care  Goal: Plan of Care Review  Outcome: Ongoing (interventions implemented as appropriate)  Pt AAOx4, wife at bedside throughout shift. Pt complains of mild pain and acid reflux, no complaints of nausea, emesis and SOB. Pt remains NPO except meds, IVF infusing to IJTLC - dressing CDI. NGT in place and clamped, residuals checked q4hrs. No BM or gas on shift. Voiding without difficulties. Safety maintained, bed alarm on - will cont to monitor.

## 2019-07-20 NOTE — PLAN OF CARE
Problem: Adult Inpatient Plan of Care  Goal: Plan of Care Review  Outcome: Ongoing (interventions implemented as appropriate)  Pt on RA with documented sats.  Will continue to monitor.

## 2019-07-20 NOTE — PLAN OF CARE
Problem: Physical Therapy Goal  Goal: Physical Therapy Goal  Goals to be met by: 2019     Patient will increase functional independence with mobility by performin. Supine to sit with Modified [place]  2. Sit to stand transfer with Modified [place]  3. Gait  x >200 feet with Modified [place] using no AD.      Outcome: Ongoing (interventions implemented as appropriate)      Pt continues to work and progress toward established goals.

## 2019-07-20 NOTE — PT/OT/SLP PROGRESS
"Physical Therapy Treatment    Patient Name:  Aubrey Gallagher   MRN:  [id number]    Recommendations:     Discharge Recommendations:  home   Discharge Equipment Recommendations: cane, straight, walker, rolling(Pending progress)   Barriers to discharge: None    Assessment:     Aubrey Gallagher is a 44 y.o. male admitted with a medical diagnosis of Malignant carcinoid tumor of ileum.  He presents with the following impairments/functional limitations:  impaired endurance, impaired self care skills, impaired functional mobilty, weakness, gait instability, impaired balance, decreased lower extremity function, decreased ROM, impaired skin, decreased coordination. Pt able to perform 2 trials of ambulation training with RW and SBA. ~90 ft and ~200 ft demonstrating very slow and cautious michael speed. Would benefit from continued PT services to increase pt's independent  functional mobility to level prior to admission addressing all impairments listed above.    Rehab Prognosis: Good; patient would benefit from acute skilled PT services to address these deficits and reach maximum level of function.    Recent Surgery: Procedure(s) (LRB):  LYMPHADENECTOMY (N/A)  EXCISION, SMALL INTESTINE (N/A)  APPENDECTOMY  CHOLECYSTECTOMY  LAPAROTOMY, EXPLORATORY 4 Days Post-Op    Plan:     During this hospitalization, patient to be seen 6 x/week to address the identified rehab impairments via gait training, therapeutic activities, therapeutic exercises and progress toward the following goals:    · Plan of Care Expires:  08/19/19    Subjective     Chief Complaint: "I have hurt burn".  Patient/Family Comments/goals: "I am ready to work. I just keep burping and every time I do my chest burns".  Pain/Comfort:  · Pain Rating 1: (Pain but did not rate)  · Location - Side 1: Bilateral  · Location - Orientation 1: generalized  · Location 1: abdomen  · Pain Addressed 1: Reposition, Distraction, Pre-medicate for activity  · Pain Rating " Post-Intervention 1: (Pain but did not rate)      Objective:     Communicated with  nurse prior to session.  Patient found supine with central line upon PT entry to room.     General Precautions: Standard, fall   Orthopedic Precautions:N/A   Braces: N/A     Functional Mobility:  · Bed Mobility:     · Rolling Right: contact guard assistance, minimum assistance and plus verbal cueing for proper execution for ease of movement  · Scooting: stand by assistance, contact guard assistance and extra time needed  · Supine to Sit: contact guard assistance, minimum assistance and verbal/tactile cueing for proper execution to ease movement to avoid increased pain with movement  · Sit to Supine: contact guard assistance, minimum assistance and guidance of trunk and for advancement of BLE's  · Transfers:     · Sit to Stand:  stand by assistance, contact guard assistance and with bed level slightly elevated with rolling walker  · Gait:  by 2 trials ~90 ft and ~200 ft with RW, IV pole in tow, and close SBA/CGA      AM-PAC 6 CLICK MOBILITY  Turning over in bed (including adjusting bedclothes, sheets and blankets)?: 3  Sitting down on and standing up from a chair with arms (e.g., wheelchair, bedside commode, etc.): 3  Moving from lying on back to sitting on the side of the bed?: 3  Moving to and from a bed to a chair (including a wheelchair)?: 3  Need to walk in hospital room?: 3  Climbing 3-5 steps with a railing?: 3  Basic Mobility Total Score: 18       Therapeutic Activities and Exercises:   Pt able to perform 2 trials of ambulation with RW, IV pole in tow, and close SBA/CGA, ~90 ft and ~200 ft. Demonstrates slow and cautious michael speed throughout ambulation. Seated therapeutic exercises 1 x 10 reps BLE's consisting of Hip add/abd and LAQ's only. Required rest break in between exercises and demonstrates slow execution of movement. Requested to use urinal while standing and required SGA/sup. Pt and wife requested to have  reminders for performing therapeutic exercises of ankle pumps and abdominal bracing. Reiterated proper form and execution of exercise movements. Pt requires extra time to complete all movements and exercises at this time.    Patient left supine with all lines intact, call button in reach,  nurse notified and  wife present..    GOALS:   Multidisciplinary Problems     Physical Therapy Goals        Problem: Physical Therapy Goal    Goal Priority Disciplines Outcome Goal Variances Interventions   Physical Therapy Goal     PT, PT/OT Ongoing (interventions implemented as appropriate)     Description:  Goals to be met by: 2019     Patient will increase functional independence with mobility by performin. Supine to sit with Modified [place]  2. Sit to stand transfer with Modified [place]  3. Gait  x >200 feet with Modified [place] using no AD.                       Time Tracking:     PT Received On: 19  PT Start Time: 1135     PT Stop Time: 1230  PT Total Time (min): 55 min     Billable Minutes: Gait Training  24, Therapeutic Activity  9 and Therapeutic Exercise  22    Treatment Type: Treatment  PT/PTA: PTA     PTA Visit Number: 1     Judi Martin, PTA  2019

## 2019-07-21 LAB
ALBUMIN SERPL BCP-MCNC: 3.2 G/DL (ref 3.5–5.2)
ALP SERPL-CCNC: 68 U/L (ref 55–135)
ALT SERPL W/O P-5'-P-CCNC: 51 U/L (ref 10–44)
ANION GAP SERPL CALC-SCNC: 10 MMOL/L (ref 8–16)
AST SERPL-CCNC: 34 U/L (ref 10–40)
BASOPHILS # BLD AUTO: 0.02 K/UL (ref 0–0.2)
BASOPHILS NFR BLD: 0.3 % (ref 0–1.9)
BILIRUB SERPL-MCNC: 0.6 MG/DL (ref 0.1–1)
BUN SERPL-MCNC: 10 MG/DL (ref 6–20)
CALCIUM SERPL-MCNC: 9.8 MG/DL (ref 8.7–10.5)
CHLORIDE SERPL-SCNC: 102 MMOL/L (ref 95–110)
CO2 SERPL-SCNC: 26 MMOL/L (ref 23–29)
CREAT SERPL-MCNC: 0.9 MG/DL (ref 0.5–1.4)
DIFFERENTIAL METHOD: ABNORMAL
EOSINOPHIL # BLD AUTO: 0.2 K/UL (ref 0–0.5)
EOSINOPHIL NFR BLD: 2.1 % (ref 0–8)
ERYTHROCYTE [DISTWIDTH] IN BLOOD BY AUTOMATED COUNT: 13.5 % (ref 11.5–14.5)
EST. GFR  (AFRICAN AMERICAN): >60 ML/MIN/1.73 M^2
EST. GFR  (NON AFRICAN AMERICAN): >60 ML/MIN/1.73 M^2
GLUCOSE SERPL-MCNC: 111 MG/DL (ref 70–110)
HCT VFR BLD AUTO: 43.1 % (ref 40–54)
HGB BLD-MCNC: 14.3 G/DL (ref 14–18)
LYMPHOCYTES # BLD AUTO: 1.2 K/UL (ref 1–4.8)
LYMPHOCYTES NFR BLD: 15.4 % (ref 18–48)
MAGNESIUM SERPL-MCNC: 1.8 MG/DL (ref 1.6–2.6)
MCH RBC QN AUTO: 27.3 PG (ref 27–31)
MCHC RBC AUTO-ENTMCNC: 33.2 G/DL (ref 32–36)
MCV RBC AUTO: 82 FL (ref 82–98)
MONOCYTES # BLD AUTO: 0.9 K/UL (ref 0.3–1)
MONOCYTES NFR BLD: 10.9 % (ref 4–15)
NEUTROPHILS # BLD AUTO: 5.6 K/UL (ref 1.8–7.7)
NEUTROPHILS NFR BLD: 71.3 % (ref 38–73)
PHOSPHATE SERPL-MCNC: 3.9 MG/DL (ref 2.7–4.5)
PLATELET # BLD AUTO: 250 K/UL (ref 150–350)
PMV BLD AUTO: 9.7 FL (ref 9.2–12.9)
POTASSIUM SERPL-SCNC: 3.9 MMOL/L (ref 3.5–5.1)
PROT SERPL-MCNC: 7 G/DL (ref 6–8.4)
RBC # BLD AUTO: 5.23 M/UL (ref 4.6–6.2)
SODIUM SERPL-SCNC: 138 MMOL/L (ref 136–145)
WBC # BLD AUTO: 7.92 K/UL (ref 3.9–12.7)

## 2019-07-21 PROCEDURE — 25000003 PHARM REV CODE 250: Performed by: STUDENT IN AN ORGANIZED HEALTH CARE EDUCATION/TRAINING PROGRAM

## 2019-07-21 PROCEDURE — 85025 COMPLETE CBC W/AUTO DIFF WBC: CPT

## 2019-07-21 PROCEDURE — 80053 COMPREHEN METABOLIC PANEL: CPT

## 2019-07-21 PROCEDURE — 11000001 HC ACUTE MED/SURG PRIVATE ROOM

## 2019-07-21 PROCEDURE — 63600175 PHARM REV CODE 636 W HCPCS: Performed by: STUDENT IN AN ORGANIZED HEALTH CARE EDUCATION/TRAINING PROGRAM

## 2019-07-21 PROCEDURE — 94761 N-INVAS EAR/PLS OXIMETRY MLT: CPT

## 2019-07-21 PROCEDURE — 99900035 HC TECH TIME PER 15 MIN (STAT)

## 2019-07-21 PROCEDURE — 83735 ASSAY OF MAGNESIUM: CPT

## 2019-07-21 PROCEDURE — 94799 UNLISTED PULMONARY SVC/PX: CPT

## 2019-07-21 PROCEDURE — 84100 ASSAY OF PHOSPHORUS: CPT

## 2019-07-21 PROCEDURE — 94664 DEMO&/EVAL PT USE INHALER: CPT

## 2019-07-21 RX ORDER — FAMOTIDINE 20 MG/1
20 TABLET, FILM COATED ORAL 2 TIMES DAILY
Status: DISCONTINUED | OUTPATIENT
Start: 2019-07-21 | End: 2019-07-23 | Stop reason: HOSPADM

## 2019-07-21 RX ORDER — AMOXICILLIN 250 MG
1 CAPSULE ORAL 2 TIMES DAILY
Status: DISCONTINUED | OUTPATIENT
Start: 2019-07-21 | End: 2019-07-23 | Stop reason: HOSPADM

## 2019-07-21 RX ORDER — OXYCODONE HYDROCHLORIDE 5 MG/1
5 TABLET ORAL ONCE
Status: COMPLETED | OUTPATIENT
Start: 2019-07-22 | End: 2019-07-22

## 2019-07-21 RX ORDER — POLYETHYLENE GLYCOL 3350 17 G/17G
17 POWDER, FOR SOLUTION ORAL DAILY
Status: DISCONTINUED | OUTPATIENT
Start: 2019-07-21 | End: 2019-07-23 | Stop reason: HOSPADM

## 2019-07-21 RX ADMIN — SENNOSIDES, DOCUSATE SODIUM 1 TABLET: 50; 8.6 TABLET, FILM COATED ORAL at 11:07

## 2019-07-21 RX ADMIN — IRON SUCROSE 100 MG: 20 INJECTION, SOLUTION INTRAVENOUS at 09:07

## 2019-07-21 RX ADMIN — CALCIUM CARBONATE (ANTACID) CHEW TAB 500 MG 500 MG: 500 CHEW TAB at 05:07

## 2019-07-21 RX ADMIN — DEXTROSE, SODIUM CHLORIDE, AND POTASSIUM CHLORIDE: 5; .45; .15 INJECTION INTRAVENOUS at 12:07

## 2019-07-21 RX ADMIN — PREGABALIN 75 MG: 75 CAPSULE ORAL at 09:07

## 2019-07-21 RX ADMIN — POLYETHYLENE GLYCOL 3350 17 G: 17 POWDER, FOR SOLUTION ORAL at 11:07

## 2019-07-21 RX ADMIN — METHOCARBAMOL TABLETS 500 MG: 500 TABLET, COATED ORAL at 05:07

## 2019-07-21 RX ADMIN — DEXTROSE, SODIUM CHLORIDE, AND POTASSIUM CHLORIDE: 5; .45; .15 INJECTION INTRAVENOUS at 10:07

## 2019-07-21 RX ADMIN — METHOCARBAMOL TABLETS 500 MG: 500 TABLET, COATED ORAL at 09:07

## 2019-07-21 RX ADMIN — ENOXAPARIN SODIUM 40 MG: 100 INJECTION SUBCUTANEOUS at 05:07

## 2019-07-21 RX ADMIN — METHOCARBAMOL TABLETS 500 MG: 500 TABLET, COATED ORAL at 12:07

## 2019-07-21 RX ADMIN — FAMOTIDINE 20 MG: 20 TABLET, FILM COATED ORAL at 11:07

## 2019-07-21 RX ADMIN — TRAMADOL HYDROCHLORIDE 100 MG: 50 TABLET, FILM COATED ORAL at 07:07

## 2019-07-21 RX ADMIN — SENNOSIDES, DOCUSATE SODIUM 1 TABLET: 50; 8.6 TABLET, FILM COATED ORAL at 09:07

## 2019-07-21 RX ADMIN — FAMOTIDINE 20 MG: 20 TABLET, FILM COATED ORAL at 09:07

## 2019-07-21 NOTE — PLAN OF CARE
Problem: Fall Injury Risk  Goal: Absence of Fall and Fall-Related Injury  Outcome: Ongoing (interventions implemented as appropriate)  Discussed with pt. Fall precautions and preventions.Call light and personal items placed within pt.s reach.Pt. Instructed to call for assistance before attempting to get out of the bed.Pt. Verbalizes understanding.

## 2019-07-21 NOTE — PLAN OF CARE
Cued into patient's room.  Permission received per patient to turn camera to view patient.  Introduced as VN for night shift that will be working with floor nurse and nursing assistant.  Educated patient on VN's role in patient care. Plan of care reviewed with patient. Education per flowsheet.  Opportunity given for questions and questions answered.  Denies pain, n/v, and sob.  Instructed to call for assistance.  Will cont to monitor.

## 2019-07-21 NOTE — PROGRESS NOTES
07/21/19 1101   Type of Frequent Check   Type Other (see comments)  (VN rounds)   Safety/Activity   Patient Rounds visualized patient   Safety Promotion/Fall Prevention Fall Risk reviewed with patient/family;instructed to call staff for mobility   Positioning   Body Position supine   Head of Bed (HOB) HOB at 20-30 degrees   Pain/Comfort/Sleep   Pain Rating (0-10): Rest 0   RASS (Bennett Agitation-Sedation Scale) 0-->alert and calm   Progress notes reviewed. Rounds completed. Introduced self as VN for this shift. Educated pt on VN's role in pt care. Educated pt about VTE and fall precautions.  Opportunity given for pt's questions. No questions or concerns expressed at this time.

## 2019-07-21 NOTE — NURSING
Pt. Resting with eyes closed.Opens eyes to speech.No distress noted at this time.will continue to monitor.IV fluids maintained as ordered.

## 2019-07-21 NOTE — PROGRESS NOTES
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    INTERVAL HISTORY  No acute events  No issues with pain control.  No nausea/vomiting since NGT removal.  Tolerating ice chips.   No flatus or BM.  Walked halls with PT yesterday.    Temp:  [98.3 °F (36.8 °C)-99.5 °F (37.5 °C)] 98.3 °F (36.8 °C)  Pulse:  [57-86] 68  Resp:  [15-20] 15  SpO2:  [95 %-98 %] 98 %  BP: (132-148)/(74-84) 132/84    I/O last 3 completed shifts:  In: 3790 [I.V.:3600; NG/GT:90; IV Piggyback:100]  Out: 3595 [Urine:3595]    EXAM  GEN: A&Ox3, NAD  CV: RRR  RESP: Non-labored breathing on room air   ABD: Soft, ND, appropriately TTP  INCISIONS/DRAINS: Midline laparotomy incision with Dermabond, well-approximated, no swelling/drainage/erythema.    LABS    All labs reviewed    Recent Labs     07/19/19  0550 07/20/19  0504 07/21/19  0503   WBC 9.26 9.55 7.92   HGB 13.1* 14.0 14.3   HCT 40.1 42.3 43.1    266 250     CMP  Recent Labs     07/19/19  0550 07/20/19  0504 07/21/19  0503    139 138   K 4.1 3.9 3.9    102 102   CO2 34* 27 26   * 120* 111*   BUN 7 9 10   CREATININE 0.9 0.8 0.9   CALCIUM 9.1 9.6 9.8   PROT 6.5 6.9 7.0   ALBUMIN 3.1* 3.2* 3.2*   BILITOT 0.4 0.4 0.6   ALKPHOS 59 64 68   AST 37 30 34   ALT 45* 42 51*   ANIONGAP 5* 10 10   ESTGFRAFRICA >60 >60 >60   EGFRNONAA >60 >60 >60     Recent Labs     07/19/19  0550 07/20/19  0504 07/21/19  0503   MG 2.0 1.9 1.8   PHOS 2.1* 2.9 3.9       IMAGING  No new imaging    ASSESSMENT/PLAN  44 year old male with mesenteric mass on CT scan, uptake in small bowel and mesentery on gallium scan; POD #5 s/p exploratory laparotomy, small bowel resection (140 cm), mesenteric lymphadenectomy, cholecystectomy, and appendectomy  - Pain control  - Start clear liquid  diet. Advance as tolerated.  - Await return of bowel function.  - Encourage IS, deep breathing, cough.    - PT/OT. Ambulate in halls    PPX: Keyur Nowak MD  [employer] General Surgery PGY-4

## 2019-07-22 ENCOUNTER — TELEPHONE (OUTPATIENT)
Dept: INFECTIOUS DISEASES | Facility: CLINIC | Age: 44
End: 2019-07-22

## 2019-07-22 LAB
ALBUMIN SERPL BCP-MCNC: 3.6 G/DL (ref 3.5–5.2)
ALP SERPL-CCNC: 80 U/L (ref 55–135)
ALT SERPL W/O P-5'-P-CCNC: 86 U/L (ref 10–44)
ANION GAP SERPL CALC-SCNC: 14 MMOL/L (ref 8–16)
AST SERPL-CCNC: 39 U/L (ref 10–40)
BASOPHILS # BLD AUTO: 0.03 K/UL (ref 0–0.2)
BASOPHILS NFR BLD: 0.3 % (ref 0–1.9)
BILIRUB SERPL-MCNC: 0.6 MG/DL (ref 0.1–1)
BUN SERPL-MCNC: 16 MG/DL (ref 6–20)
CALCIUM SERPL-MCNC: 10.4 MG/DL (ref 8.7–10.5)
CHLORIDE SERPL-SCNC: 103 MMOL/L (ref 95–110)
CO2 SERPL-SCNC: 21 MMOL/L (ref 23–29)
CREAT SERPL-MCNC: 1.1 MG/DL (ref 0.5–1.4)
DIFFERENTIAL METHOD: ABNORMAL
EOSINOPHIL # BLD AUTO: 0 K/UL (ref 0–0.5)
EOSINOPHIL NFR BLD: 0.3 % (ref 0–8)
ERYTHROCYTE [DISTWIDTH] IN BLOOD BY AUTOMATED COUNT: 13.7 % (ref 11.5–14.5)
EST. GFR  (AFRICAN AMERICAN): >60 ML/MIN/1.73 M^2
EST. GFR  (NON AFRICAN AMERICAN): >60 ML/MIN/1.73 M^2
GLUCOSE SERPL-MCNC: 118 MG/DL (ref 70–110)
HCT VFR BLD AUTO: 49 % (ref 40–54)
HGB BLD-MCNC: 16.7 G/DL (ref 14–18)
LYMPHOCYTES # BLD AUTO: 1.5 K/UL (ref 1–4.8)
LYMPHOCYTES NFR BLD: 16.4 % (ref 18–48)
MAGNESIUM SERPL-MCNC: 2.1 MG/DL (ref 1.6–2.6)
MCH RBC QN AUTO: 27.8 PG (ref 27–31)
MCHC RBC AUTO-ENTMCNC: 34.1 G/DL (ref 32–36)
MCV RBC AUTO: 82 FL (ref 82–98)
MONOCYTES # BLD AUTO: 0.8 K/UL (ref 0.3–1)
MONOCYTES NFR BLD: 8.6 % (ref 4–15)
NEUTROPHILS # BLD AUTO: 6.8 K/UL (ref 1.8–7.7)
NEUTROPHILS NFR BLD: 74.4 % (ref 38–73)
PHOSPHATE SERPL-MCNC: 5.1 MG/DL (ref 2.7–4.5)
PLATELET # BLD AUTO: 303 K/UL (ref 150–350)
PMV BLD AUTO: 9.4 FL (ref 9.2–12.9)
POTASSIUM SERPL-SCNC: 4.3 MMOL/L (ref 3.5–5.1)
PROT SERPL-MCNC: 8 G/DL (ref 6–8.4)
RBC # BLD AUTO: 6.01 M/UL (ref 4.6–6.2)
SODIUM SERPL-SCNC: 138 MMOL/L (ref 136–145)
WBC # BLD AUTO: 9.38 K/UL (ref 3.9–12.7)

## 2019-07-22 PROCEDURE — 27000646 HC AEROBIKA DEVICE

## 2019-07-22 PROCEDURE — 80053 COMPREHEN METABOLIC PANEL: CPT

## 2019-07-22 PROCEDURE — 25000003 PHARM REV CODE 250: Performed by: SURGERY

## 2019-07-22 PROCEDURE — 63600175 PHARM REV CODE 636 W HCPCS: Performed by: STUDENT IN AN ORGANIZED HEALTH CARE EDUCATION/TRAINING PROGRAM

## 2019-07-22 PROCEDURE — 94664 DEMO&/EVAL PT USE INHALER: CPT

## 2019-07-22 PROCEDURE — 25000003 PHARM REV CODE 250: Performed by: STUDENT IN AN ORGANIZED HEALTH CARE EDUCATION/TRAINING PROGRAM

## 2019-07-22 PROCEDURE — 97530 THERAPEUTIC ACTIVITIES: CPT

## 2019-07-22 PROCEDURE — 97110 THERAPEUTIC EXERCISES: CPT

## 2019-07-22 PROCEDURE — 97116 GAIT TRAINING THERAPY: CPT

## 2019-07-22 PROCEDURE — 94761 N-INVAS EAR/PLS OXIMETRY MLT: CPT

## 2019-07-22 PROCEDURE — 94799 UNLISTED PULMONARY SVC/PX: CPT

## 2019-07-22 PROCEDURE — 11000001 HC ACUTE MED/SURG PRIVATE ROOM

## 2019-07-22 PROCEDURE — 36415 COLL VENOUS BLD VENIPUNCTURE: CPT

## 2019-07-22 PROCEDURE — 99900035 HC TECH TIME PER 15 MIN (STAT)

## 2019-07-22 PROCEDURE — 85025 COMPLETE CBC W/AUTO DIFF WBC: CPT

## 2019-07-22 PROCEDURE — 83735 ASSAY OF MAGNESIUM: CPT

## 2019-07-22 PROCEDURE — 84100 ASSAY OF PHOSPHORUS: CPT

## 2019-07-22 RX ORDER — KETOROLAC TROMETHAMINE 30 MG/ML
10 INJECTION, SOLUTION INTRAMUSCULAR; INTRAVENOUS EVERY 6 HOURS PRN
Status: DISCONTINUED | OUTPATIENT
Start: 2019-07-22 | End: 2019-07-23 | Stop reason: HOSPADM

## 2019-07-22 RX ORDER — METHOCARBAMOL 500 MG/1
500 TABLET, FILM COATED ORAL 4 TIMES DAILY
Status: DISCONTINUED | OUTPATIENT
Start: 2019-07-22 | End: 2019-07-23 | Stop reason: HOSPADM

## 2019-07-22 RX ADMIN — PREGABALIN 75 MG: 75 CAPSULE ORAL at 09:07

## 2019-07-22 RX ADMIN — METHOCARBAMOL TABLETS 500 MG: 500 TABLET, COATED ORAL at 05:07

## 2019-07-22 RX ADMIN — METHOCARBAMOL TABLETS 500 MG: 500 TABLET, COATED ORAL at 09:07

## 2019-07-22 RX ADMIN — FAMOTIDINE 20 MG: 20 TABLET, FILM COATED ORAL at 08:07

## 2019-07-22 RX ADMIN — PREGABALIN 75 MG: 75 CAPSULE ORAL at 08:07

## 2019-07-22 RX ADMIN — ENOXAPARIN SODIUM 40 MG: 100 INJECTION SUBCUTANEOUS at 05:07

## 2019-07-22 RX ADMIN — FAMOTIDINE 20 MG: 20 TABLET, FILM COATED ORAL at 09:07

## 2019-07-22 RX ADMIN — ONDANSETRON 4 MG: 2 INJECTION INTRAMUSCULAR; INTRAVENOUS at 12:07

## 2019-07-22 RX ADMIN — SENNOSIDES, DOCUSATE SODIUM 1 TABLET: 50; 8.6 TABLET, FILM COATED ORAL at 08:07

## 2019-07-22 RX ADMIN — OXYCODONE HYDROCHLORIDE 5 MG: 5 TABLET ORAL at 12:07

## 2019-07-22 RX ADMIN — METHOCARBAMOL TABLETS 500 MG: 500 TABLET, COATED ORAL at 12:07

## 2019-07-22 RX ADMIN — SENNOSIDES, DOCUSATE SODIUM 1 TABLET: 50; 8.6 TABLET, FILM COATED ORAL at 09:07

## 2019-07-22 RX ADMIN — METHOCARBAMOL TABLETS 500 MG: 500 TABLET, COATED ORAL at 08:07

## 2019-07-22 RX ADMIN — KETOROLAC TROMETHAMINE 10 MG: 30 INJECTION, SOLUTION INTRAMUSCULAR at 05:07

## 2019-07-22 NOTE — PROGRESS NOTES
"NEUROENDOCRINE SURGERY PROGRESS NOTE    44 year old male with mesenteric mass on CT scan, uptake in small bowel and mesentery on gallium scan; s/p exploratory laparotomy, small bowel resection (140 cm), mesenteric lymphadenectomy, cholecystectomy, and appendectomy 7/16/19.    INTERVAL HISTORY  AFVSS  No acute events  No issues with pain control  One episode of "emesis" associated with coughing/GERD, but tolerating CLD with no nausea or emesis associated with meals.  + BM yesterday    Temp:  [98 °F (36.7 °C)-99.1 °F (37.3 °C)] 98.8 °F (37.1 °C)  Pulse:  [] 92  Resp:  [16-20] 20  SpO2:  [94 %-98 %] 94 %  BP: (130-165)/(86-95) 142/94    I/O last 3 completed shifts:  In: 2648.3 [P.O.:150; I.V.:2398.3; IV Piggyback:100]  Out: 3675 [Urine:3475; Emesis/NG output:200]    EXAM  GEN: A&Ox3, NAD  CV: RRR  RESP: Non-labored breathing on room air   ABD: Soft, ND, appropriately TTP  INCISIONS/DRAINS: Midline laparotomy incision with Dermabond, well-approximated, no swelling/drainage/erythema.    LABS    All labs reviewed    Recent Labs     07/20/19  0504 07/21/19  0503 07/22/19  0640   WBC 9.55 7.92 9.38   HGB 14.0 14.3 16.7   HCT 42.3 43.1 49.0    250 303     CMP  Recent Labs     07/20/19  0504 07/21/19  0503 07/22/19  0636    138 138   K 3.9 3.9 4.3    102 103   CO2 27 26 21*   * 111* 118*   BUN 9 10 16   CREATININE 0.8 0.9 1.1   CALCIUM 9.6 9.8 10.4   PROT 6.9 7.0 8.0   ALBUMIN 3.2* 3.2* 3.6   BILITOT 0.4 0.6 0.6   ALKPHOS 64 68 80   AST 30 34 39   ALT 42 51* 86*   ANIONGAP 10 10 14   ESTGFRAFRICA >60 >60 >60   EGFRNONAA >60 >60 >60     Recent Labs     07/20/19  0504 07/21/19  0503 07/22/19  0636   MG 1.9 1.8 2.1   PHOS 2.9 3.9 5.1*       IMAGING  No new imaging    ASSESSMENT/PLAN  44 year old male with mesenteric mass on CT scan, uptake in small bowel and mesentery on gallium scan; POD #6 s/p exploratory laparotomy, small bowel resection (140 cm), mesenteric lymphadenectomy, cholecystectomy, " and appendectomy  - Pain control  - Tolerating CLD, advance to soft per pt request. OK to progress to regular diet if tolerating.  - Encourage IS, deep breathing, cough.    - PT/OT. Ambulate in halls  - PPX: Lovenox  -  Dispo: likely home today vs tomorrow       Luz Elena Bustillo MD  [employer] General Surgery PGY-2

## 2019-07-22 NOTE — PLAN OF CARE
Problem: Adult Inpatient Plan of Care  Goal: Plan of Care Review  Outcome: Ongoing (interventions implemented as appropriate)  Patient AAOx4, VSS, Patient tolerating soft diet, denies nausea or vomiting. Patient denies any pain. Patient tolerating activity, ambulating in halls with PT. Free from falls. Midline abdominal incision clean, dry, and intact with derma bond.Patient in NAD. Call bell in reach. Bed alarm in place. Safety maintained, will continue to monitor.     Problem: Infection  Goal: Infection Symptom Resolution  Outcome: Ongoing (interventions implemented as appropriate)       Problem: Skin Injury Risk Increased  Goal: Skin Health and Integrity  Outcome: Ongoing (interventions implemented as appropriate)       Problem: Pain Acute  Goal: Optimal Pain Control  Outcome: Ongoing (interventions implemented as appropriate)       Problem: Nausea and Vomiting  Goal: Fluid and Electrolyte Balance  Outcome: Ongoing (interventions implemented as appropriate)

## 2019-07-22 NOTE — PLAN OF CARE
Problem: Adult Inpatient Plan of Care  Goal: Plan of Care Review  Outcome: Ongoing (interventions implemented as appropriate)  Pt AAox4, VSS, NAD. No complaints of N/V or headache. Mild pain from acid reflux. Team notified, PRN and scheduled medications given. IV fluids D/C per MAR. Ate <25% of clear liquid diet.

## 2019-07-22 NOTE — PLAN OF CARE
Problem: Adult Inpatient Plan of Care  Goal: Plan of Care Review  Pt on Room air. No distress noted. Will cont. to monitor

## 2019-07-22 NOTE — PLAN OF CARE
[facility] Infectious Disease Plan of Care Note    Primary Team: Surgery  Consultant Attending: Janette  Date of Admit: 7/16/2019    Summary of history     Aubrey Gallagher is a 44 y.o. male with a recent mesenteric mass found on CT scan and positive uptake in small bowel and mesentery on gallium scan. The patient presented to [facility] on 7/16/2019 for exploratory laparotomy, bowel resection (140 cm), mesenteric lymphadenopathy, cholecystectomy, and appendectomy.      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 He reports that all prior PPD's have been negative. On 6/4, he was seen by ID physician Dr. Shen Leger who reported that his TB induration was >11 mm but that chest x-ray showed no cavitary lesions or adenopathy. Patient denied any night sweats, fever, chills, cough, unintentional weight loss, or SOB at this time. She recommended 9 months of INH with B6 therapy for latent TB.      A quantiferon was then performed on 7/8 that was negative and the patient decided to refrain from INH therapy until after surgery.     Subjective     Spoke with the patient and wife at bedside. He has decided to start TB treatment (Isoniazid and B6) after getting back from an anniversary trip he had planned already.    Recommendations     Latent Tuberculosis  - Recommend starting INH for 9 months with B6 therapy  - Patient wishes to start treatment after returning from anniversary trip in October  - Spoke with pharmacy and they report no drug interactions at this time with INH therapy  - Spoke with the  to get him an appointment with ID at [facility] main campus in October.    Thank you for the consult. ID will sign off at this time. Please call with any questions.    Diane Damon MD  [facility] Internal Medicine Resident, [facility]

## 2019-07-22 NOTE — PLAN OF CARE
Problem: Adult Inpatient Plan of Care  Goal: Plan of Care Review  Outcome: Ongoing (interventions implemented as appropriate)  Pt AAOx3. Pt with complaints of abdominal pain with mild relief from PRN tramadol. One time dose percalone given with moderate relief. 1x episode of emesis. PRN zofran administered with full relief. Midline incision remains CDI with dermabond. R IJ TLC removed by MERARI Doyle. Peripheral IV inserted. Pt ambulating to toilet, pt reports lightheadedness with ambulation. Family remains at bedside, attentive to pt. Bed locked in lowest position, bed alarm set and call bell within reach. Pt verbalized understanding to call for any needs or assistance. Will continue to monitor.

## 2019-07-22 NOTE — PT/OT/SLP PROGRESS
Physical Therapy Treatment    Patient Name:  Aubrey Gallagher   MRN:  [id number]    Recommendations:     Discharge Recommendations:  home   Discharge Equipment Recommendations: (TBD)   Barriers to discharge: decreased mobility,strenghth and endurance    Assessment:     Aubrey Gallagher is a 44 y.o. male admitted with a medical diagnosis of Malignant carcinoid tumor of ileum.  He presents with the following impairments/functional limitations:  weakness, impaired endurance, impaired functional mobilty, gait instability, impaired balance, decreased lower extremity function, decreased ROM, impaired coordination, impaired skin,pt with improving mobility and endurance,pt ambulated some today w/o RW and no LOB,continue and progress toward goals.    Rehab Prognosis: Good; patient would benefit from acute skilled PT services to address these deficits and reach maximum level of function.    Recent Surgery: Procedure(s) (LRB):  LYMPHADENECTOMY (N/A)  EXCISION, SMALL INTESTINE (N/A)  APPENDECTOMY  CHOLECYSTECTOMY  LAPAROTOMY, EXPLORATORY 6 Days Post-Op    Plan:     During this hospitalization, patient to be seen 6 x/week to address the identified rehab impairments via gait training, therapeutic activities, therapeutic exercises and progress toward the following goals:    · Plan of Care Expires:  08/19/19    Subjective     Chief Complaint: n/a  Patient/Family Comments/goals: pt states the stairs won't be a problem.  Pain/Comfort:  · Pain Rating 1: (no rating)  · Location - Orientation 1: generalized  · Location 1: abdomen  · Pain Addressed 1: Reposition, Distraction      Objective:     Communicated with nsg prior to session.  Patient found supine with central line upon PT entry to room.     General Precautions: Standard, fall   Orthopedic Precautions:N/A   Braces: N/A     Functional Mobility:  · Bed Mobility:     · Supine to Sit: supervision  · Transfers:     · Sit to Stand:  supervision with rolling walker  · Bed to Chair:  stand by assistance with  no AD  using  ambulation  · Gait: amb ~200' X 2 with RW and S and ambulated ~40' X 1 w/o AD and SBA,no LOB  · Balance: fair standing balance      AM-PAC 6 CLICK MOBILITY  Turning over in bed (including adjusting bedclothes, sheets and blankets)?: 4  Sitting down on and standing up from a chair with arms (e.g., wheelchair, bedside commode, etc.): 3  Moving from lying on back to sitting on the side of the bed?: 3  Moving to and from a bed to a chair (including a wheelchair)?: 3  Need to walk in hospital room?: 3  Climbing 3-5 steps with a railing?: 3  Basic Mobility Total Score: 19       Therapeutic Activities and Exercises: le seated ex's X 10-12 reps inc: ap,laq,hip flex,abd/add,amb up/down 5 steps X 3 consecutive trials with S using one handrail and B ue's       Patient left up in chair with all lines intact, call button in reach and mom present..    GOALS: see general POC  Multidisciplinary Problems     Physical Therapy Goals        Problem: Physical Therapy Goal    Goal Priority Disciplines Outcome Goal Variances Interventions   Physical Therapy Goal     PT, PT/OT Ongoing (interventions implemented as appropriate)     Description:  Goals to be met by: 2019     Patient will increase functional independence with mobility by performin. Supine to sit with Modified [place]  2. Sit to stand transfer with Modified [place]  3. Gait  x >200 feet with Modified [place] using no AD.                       Time Tracking:     PT Received On: 19  PT Start Time: 0855     PT Stop Time: 35  PT Total Time (min): 40 min     Billable Minutes: Gait Training 18, Therapeutic Activity 12 and Therapeutic Exercise 10    Treatment Type: Treatment  PT/PTA: PTA     PTA Visit Number: 2     Poncho Oliveira, PTA  2019

## 2019-07-22 NOTE — PT/OT/SLP PROGRESS
Occupational Therapy   Treatment    Name: Aubrey Gallagher  MRN: [id number]  Admitting Diagnosis:  Malignant carcinoid tumor of ileum  6 Days Post-Op    Recommendations:     Discharge Recommendations: home  Discharge Equipment Recommendations:  walker, rolling, shower chair  Barriers to discharge:  None    Assessment:     Aubrey Gallagher is a 44 y.o. male with a medical diagnosis of Malignant carcinoid tumor of ileum.  He presents with performance deficits affecting function are weakness, impaired endurance, impaired functional mobilty, gait instability, impaired balance, decreased lower extremity function, decreased ROM, impaired skin, pain, edema.     Rehab Prognosis:  Good; patient would benefit from acute skilled OT services to address these deficits and reach maximum level of function.       Plan:     Patient to be seen 5 x/week to address the above listed problems via self-care/home management, therapeutic activities, therapeutic exercises  · Plan of Care Expires: 08/19/19  · Plan of Care Reviewed with: patient    Subjective     Pain/Comfort:  · Pain Rating 1: 2/10  · Location - Side 1: Bilateral  · Location - Orientation 1: generalized  · Location 1: abdomen  · Pain Addressed 1: Cessation of Activity, Reposition, Distraction  · Pain Rating Post-Intervention 1: 2/10    Objective:     Communicated with: nurse prior to session.  Patient found up in chair with bed alarm, peripheral IV, telemetry, SCD, oxygen, central line, NG tube upon OT entry to room.    General Precautions: Standard, fall   Orthopedic Precautions:N/A   Braces: N/A     Occupational Performance:     Bed Mobility:    · Patient completed Sit to Supine with supervision     Functional Mobility/Transfers:  · Patient completed Sit <> Stand Transfer with stand by assistance  with  no assistive device   · Patient completed Bed <> Chair Transfer using Step Transfer technique with stand by assistance with no assistive device  · Functional Mobility: Pt  ambulated in room chair <> bathroom and performed dry toilet t/f w/ SBA-CGA and no AD.  Pt with loss of balance on one occasion during ambulation w/ quick recovery and no fall.     Activities of Daily Living:  · Lower Body Dressing: supervision and stand by assistance setup to doff/don socks seated EOB      [facility] 6 Click ADL: 18    Treatment & Education:  Pt educated on positioning, mobility regarding car travel upon D/C, postural strengthening, stretching to maintain trunk control.  Educated on tech for sit to supine on flat bed, bed positioning for sleep, LE dressing tech.      Patient left HOB elevated with all lines intact, call button in reach and nurse notifiedEducation:      GOALS:   Multidisciplinary Problems     Occupational Therapy Goals        Problem: Occupational Therapy Goal    Goal Priority Disciplines Outcome Interventions   Occupational Therapy Goal     OT, PT/OT Ongoing (interventions implemented as appropriate)    Description:  Goals to be met by: 8/19/19     Patient will increase functional independence with ADLs by performing:    UE Dressing with Modified [place].  LE Dressing with Modified [place].  Grooming while standing with Modified [place].  Toileting from toilet with Modified [place] for hygiene and clothing management.   Toilet transfer to toilet with Modified [place].  Upper extremity exercise program x10 reps per handout, with independence.                      Time Tracking:     OT Date of Treatment: 07/22/19  OT Start Time: 1345  OT Stop Time: 1411  OT Total Time (min): 26 min    Billable Minutes:Therapeutic Activity 26    RYANNE Sinclair  7/22/2019

## 2019-07-22 NOTE — PLAN OF CARE
VN rounds:  VN cued into pt's room with pt's permission.  Pt sitting in recliner with call bell near, family at bedside,  Fall risk protocol discussed with pt.  VN instructed to call for assistance.  Pt aware and agreeable.  Pt denies pain, anxiety or nausea.  Instructed pt on new orders for IV toradol, pt not in need at this time.  No acute distress noted.  Allowed time for questions.  Will continue to be available and intervene as needed.

## 2019-07-22 NOTE — PLAN OF CARE
Problem: Physical Therapy Goal  Goal: Physical Therapy Goal  Goals to be met by: 2019     Patient will increase functional independence with mobility by performin. Supine to sit with Modified [place]  2. Sit to stand transfer with Modified [place]  3. Gait  x >200 feet with Modified [place] using no AD.      Outcome: Ongoing (interventions implemented as appropriate)  Goals ongoing

## 2019-07-22 NOTE — PLAN OF CARE
Problem: Occupational Therapy Goal  Goal: Occupational Therapy Goal  Goals to be met by: 8/19/19     Patient will increase functional independence with ADLs by performing:    UE Dressing with Modified [place].  LE Dressing with Modified [place].  Grooming while standing with Modified [place].  Toileting from toilet with Modified [place] for hygiene and clothing management.   Toilet transfer to toilet with Modified [place].  Upper extremity exercise program x10 reps per handout, with independence.     Outcome: Ongoing (interventions implemented as appropriate)  Pt educated on positioning, mobility regarding car travel upon D/C, postural strengthening, stretching to maintain trunk control.  Educated on tech for sit to supine on flat bed, bed positioning for sleep, LE dressing tech.     Continue POC.

## 2019-07-23 ENCOUNTER — TELEPHONE (OUTPATIENT)
Dept: NEUROLOGY | Facility: HOSPITAL | Age: 44
End: 2019-07-23

## 2019-07-23 VITALS
DIASTOLIC BLOOD PRESSURE: 83 MMHG | SYSTOLIC BLOOD PRESSURE: 125 MMHG | BODY MASS INDEX: 35.3 KG/M2 | TEMPERATURE: 98 F | RESPIRATION RATE: 18 BRPM | HEART RATE: 81 BPM | HEIGHT: 69 IN | WEIGHT: 238.31 LBS | OXYGEN SATURATION: 98 %

## 2019-07-23 LAB
ALBUMIN SERPL BCP-MCNC: 3.4 G/DL (ref 3.5–5.2)
ALP SERPL-CCNC: 74 U/L (ref 55–135)
ALT SERPL W/O P-5'-P-CCNC: 89 U/L (ref 10–44)
ANION GAP SERPL CALC-SCNC: 9 MMOL/L (ref 8–16)
AST SERPL-CCNC: 39 U/L (ref 10–40)
BASOPHILS # BLD AUTO: 0.02 K/UL (ref 0–0.2)
BASOPHILS NFR BLD: 0.2 % (ref 0–1.9)
BILIRUB SERPL-MCNC: 0.6 MG/DL (ref 0.1–1)
BUN SERPL-MCNC: 23 MG/DL (ref 6–20)
CALCIUM SERPL-MCNC: 9.7 MG/DL (ref 8.7–10.5)
CHLORIDE SERPL-SCNC: 102 MMOL/L (ref 95–110)
CO2 SERPL-SCNC: 27 MMOL/L (ref 23–29)
CREAT SERPL-MCNC: 1.3 MG/DL (ref 0.5–1.4)
DIFFERENTIAL METHOD: ABNORMAL
EOSINOPHIL # BLD AUTO: 0.2 K/UL (ref 0–0.5)
EOSINOPHIL NFR BLD: 2.2 % (ref 0–8)
ERYTHROCYTE [DISTWIDTH] IN BLOOD BY AUTOMATED COUNT: 13.9 % (ref 11.5–14.5)
EST. GFR  (AFRICAN AMERICAN): >60 ML/MIN/1.73 M^2
EST. GFR  (NON AFRICAN AMERICAN): >60 ML/MIN/1.73 M^2
GLUCOSE SERPL-MCNC: 121 MG/DL (ref 70–110)
HCT VFR BLD AUTO: 46.4 % (ref 40–54)
HGB BLD-MCNC: 15.4 G/DL (ref 14–18)
LYMPHOCYTES # BLD AUTO: 1.2 K/UL (ref 1–4.8)
LYMPHOCYTES NFR BLD: 12.5 % (ref 18–48)
MAGNESIUM SERPL-MCNC: 2.2 MG/DL (ref 1.6–2.6)
MCH RBC QN AUTO: 27.6 PG (ref 27–31)
MCHC RBC AUTO-ENTMCNC: 33.2 G/DL (ref 32–36)
MCV RBC AUTO: 83 FL (ref 82–98)
MONOCYTES # BLD AUTO: 0.8 K/UL (ref 0.3–1)
MONOCYTES NFR BLD: 8.4 % (ref 4–15)
NEUTROPHILS # BLD AUTO: 7 K/UL (ref 1.8–7.7)
NEUTROPHILS NFR BLD: 76.7 % (ref 38–73)
PHOSPHATE SERPL-MCNC: 2.9 MG/DL (ref 2.7–4.5)
PLATELET # BLD AUTO: 305 K/UL (ref 150–350)
PMV BLD AUTO: 9.3 FL (ref 9.2–12.9)
POTASSIUM SERPL-SCNC: 4.1 MMOL/L (ref 3.5–5.1)
PROT SERPL-MCNC: 7.5 G/DL (ref 6–8.4)
RBC # BLD AUTO: 5.58 M/UL (ref 4.6–6.2)
SODIUM SERPL-SCNC: 138 MMOL/L (ref 136–145)
WBC # BLD AUTO: 9.3 K/UL (ref 3.9–12.7)

## 2019-07-23 PROCEDURE — 97110 THERAPEUTIC EXERCISES: CPT

## 2019-07-23 PROCEDURE — 80053 COMPREHEN METABOLIC PANEL: CPT

## 2019-07-23 PROCEDURE — 83735 ASSAY OF MAGNESIUM: CPT

## 2019-07-23 PROCEDURE — 84100 ASSAY OF PHOSPHORUS: CPT

## 2019-07-23 PROCEDURE — 25000003 PHARM REV CODE 250: Performed by: STUDENT IN AN ORGANIZED HEALTH CARE EDUCATION/TRAINING PROGRAM

## 2019-07-23 PROCEDURE — 25000003 PHARM REV CODE 250: Performed by: SURGERY

## 2019-07-23 PROCEDURE — 85025 COMPLETE CBC W/AUTO DIFF WBC: CPT

## 2019-07-23 PROCEDURE — 36415 COLL VENOUS BLD VENIPUNCTURE: CPT

## 2019-07-23 PROCEDURE — 97116 GAIT TRAINING THERAPY: CPT

## 2019-07-23 RX ORDER — POLYETHYLENE GLYCOL 3350 17 G/17G
17 POWDER, FOR SOLUTION ORAL DAILY
Qty: 510 G | Refills: 0 | Status: SHIPPED | OUTPATIENT
Start: 2019-07-24

## 2019-07-23 RX ORDER — TRAMADOL HYDROCHLORIDE 50 MG/1
50 TABLET ORAL EVERY 6 HOURS PRN
Qty: 30 TABLET | Refills: 0 | Status: SHIPPED | OUTPATIENT
Start: 2019-07-23 | End: 2022-08-11

## 2019-07-23 RX ORDER — METHOCARBAMOL 500 MG/1
500 TABLET, FILM COATED ORAL 3 TIMES DAILY
Qty: 30 TABLET | Refills: 0 | Status: SHIPPED | OUTPATIENT
Start: 2019-07-23 | End: 2019-08-02

## 2019-07-23 RX ORDER — METHOCARBAMOL 500 MG/1
500 TABLET, FILM COATED ORAL 3 TIMES DAILY
Qty: 30 TABLET | Refills: 0 | Status: SHIPPED | OUTPATIENT
Start: 2019-07-23 | End: 2019-07-23

## 2019-07-23 RX ADMIN — SENNOSIDES, DOCUSATE SODIUM 1 TABLET: 50; 8.6 TABLET, FILM COATED ORAL at 08:07

## 2019-07-23 RX ADMIN — METHOCARBAMOL TABLETS 500 MG: 500 TABLET, COATED ORAL at 08:07

## 2019-07-23 RX ADMIN — PREGABALIN 75 MG: 75 CAPSULE ORAL at 08:07

## 2019-07-23 RX ADMIN — FAMOTIDINE 20 MG: 20 TABLET, FILM COATED ORAL at 08:07

## 2019-07-23 NOTE — PLAN OF CARE
Discharge order noted. DME(RW and Shower chair) to be delivered by [facility] tomorrow AM (pt spoke to [facility] over the phone); pt's wife to provide assistance as needed until DME delivered. No HH noted. Case Management Discharge Information sheet reviewed with pt and pt's wife. Pt call [facility] ID clinic back to reschedule follow up appt. Message sent to Dr. Plummer's office to schedule follow up appointment, Office to call patient with appt. Pt to follow up with his PCP as needed upon discharge.[facility] provided pt and [facility]'s wife with [facility]  contact info for any additional needs.    Discharge rounds on patient. Discussed followup appointments, blue discharge folder, discharge nurse will go over home medications and reasons for medications and final discharge instructions. All patient/caregiver questions answered. Patient verbalized understanding.         07/23/19 1636   Final Note   Assessment Type Final Discharge Note   Anticipated Discharge Disposition Home   What phone number can be called within the next 1-3 days to see how you are doing after discharge? [phone]   Hospital Follow Up  Appt(s) scheduled? Yes  ('s office to call with follow up appointment)   Discharge plans and expectations educations in teach back method with documentation complete? Yes   Right Care Referral Info   Post Acute Recommendation No Care

## 2019-07-23 NOTE — PT/OT/SLP PROGRESS
Physical Therapy Treatment    Patient Name:  Aubrey Gallagher   MRN:  [id number]    Recommendations:     Discharge Recommendations:  home   Discharge Equipment Recommendations: (TBD)   Barriers to discharge: decreased endurance    Assessment:     Aubrey Gallagher is a 44 y.o. male admitted with a medical diagnosis of Malignant carcinoid tumor of ileum.  He presents with the following impairments/functional limitations:  impaired endurance, impaired functional mobilty, impaired skin, decreased ROM,pt with improving mobility and balance,pt ambulating in room independently,possible discharge home today.    Rehab Prognosis: Good; patient would benefit from acute skilled PT services to address these deficits and reach maximum level of function.    Recent Surgery: Procedure(s) (LRB):  LYMPHADENECTOMY (N/A)  EXCISION, SMALL INTESTINE (N/A)  APPENDECTOMY  CHOLECYSTECTOMY  LAPAROTOMY, EXPLORATORY 7 Days Post-Op    Plan:     During this hospitalization, patient to be seen 6 x/week to address the identified rehab impairments via gait training, therapeutic activities, therapeutic exercises and progress toward the following goals:    · Plan of Care Expires:  08/19/19    Subjective     Chief Complaint: n/a  Patient/Family Comments/goals: pt is ready to go home.  Pain/Comfort:  · Pain Rating 1: (no c/o's)      Objective:     Communicated with nsg prior to session.  Patient found supine with central line upon PT entry to room.     General Precautions: Standard, fall   Orthopedic Precautions:N/A   Braces: N/A     Functional Mobility:  · Bed Mobility:     · Supine to Sit: modified independence  · Transfers:     · Sit to Stand:  modified independence with no AD  · Gait: amb ~250' and ~125' w/o AD and S w/o LOB  · Balance: dynamic balance activities with SBA      AM-PAC 6 CLICK MOBILITY  Turning over in bed (including adjusting bedclothes, sheets and blankets)?: 4  Sitting down on and standing up from a chair with arms (e.g.,  wheelchair, bedside commode, etc.): 4  Moving from lying on back to sitting on the side of the bed?: 4  Moving to and from a bed to a chair (including a wheelchair)?: 3  Need to walk in hospital room?: 3  Climbing 3-5 steps with a railing?: 3  Basic Mobility Total Score: 21       Therapeutic Activities and Exercises: standing ex's inc: toe raises and mini squats with CGA,issued pt HEP.       Patient left EOB with call button in reach and nsg notified..    GOALS: see general POC  Multidisciplinary Problems     Physical Therapy Goals        Problem: Physical Therapy Goal    Goal Priority Disciplines Outcome Goal Variances Interventions   Physical Therapy Goal     PT, PT/OT Ongoing (interventions implemented as appropriate)     Description:  Goals to be met by: 2019     Patient will increase functional independence with mobility by performin. Supine to sit with Modified [place]  MET 19  2. Sit to stand transfer with Modified [place]  MET 19  3. Gait  x >200 feet with Modified [place] using no AD.                        Time Tracking:     PT Received On: 19  PT Start Time: 1314     PT Stop Time: 1339  PT Total Time (min): 25 min     Billable Minutes: Gait Training 16 and Therapeutic Exercise 9    Treatment Type: Treatment  PT/PTA: PTA     PTA Visit Number: 3     Poncho Oliveira, PTA  2019

## 2019-07-23 NOTE — PLAN OF CARE
VN rounds:  VN cued into pt's room with pt's permission.  Pt resting in bed in low position and call bell in lap. Fall risk protocol discussed with pt.  VN instructed to call for assistance.  Pt aware and agreeable.   No acute distress noted.  Allowed time for questions.  Will continue to be available and intervene as needed.

## 2019-07-23 NOTE — TELEPHONE ENCOUNTER
----- Message from Perla Garcia sent at 7/23/2019  2:34 PM CDT -----  Contact: self / [phone]  KORY: Needs a 4 week Hospital Follow Up. Please advise. Please advise

## 2019-07-23 NOTE — PLAN OF CARE
faxed completed discharge worksheet and orders for RW and SC to [phone].    Per [facility]  she will work on the dme order once received.

## 2019-07-23 NOTE — PLAN OF CARE
07/23/19 1227   Post-Acute Status   Post-Acute Authorization E   [facility] Status Referrals Sent

## 2019-07-23 NOTE — DISCHARGE SUMMARY
[facility] General Surgery  Neuroendocrine Tumor Service  Discharge Summary      Patient Name: Aubrey Gallagher  MRN: [id number]  Admission Date: 7/16/2019  Hospital Length of Stay: 7 days  Discharge Date and Time: 7/23/2019  5:26 PM  Attending Physician: Dr. 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  He has a gallium scan that is positive at the primary site as well as at the mesenteric base.  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  He was seen by ID who recommended prophylactic treatment. Underwent TB gold test came back negative. He does not have any metastatic disease on MRI or gallium scan.  The plan would be to undergo small bowel resection with or without colon, lymphadenectomy, cholecystectomy and possible liver resection    Procedure(s) (LRB):  LYMPHADENECTOMY (N/A)  EXCISION, SMALL INTESTINE (N/A)  APPENDECTOMY  CHOLECYSTECTOMY  LAPAROTOMY, EXPLORATORY     Hospital Course: He underwent exploratory laparotomy with appendectomy, cholecystectomy, resection of 140 cm of small bowel, and intralesional 5-FU.  His overall recovery was unremarkable. By post op day 7 he had return of bowel function, was tolerating a diet with no nausea or vomiting, was able to ambulate and void without issue, had well controlled pain on oral medications, and was otherwise stable for discharge.  He will follow up with Dr. Plummer in clinic in 4 weeks.      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    Specialty:  Infectious Diseases  Why:  please call office back to reschedule follow up appointment.  Contact information:  [address]  [facility] [address]  [phone]           Kavitha Carlos MD In 4 weeks.    Specialty:  General Surgery  Why:  message sent to 's office for hospital follow up appointment, office to call patient with follow up appointment  Contact information:  [address]  [address]  [facility] [id number] [phone]             Noble Abdul MD.    Specialty:  Internal Medicine  Why:  as needed  Contact information:  [address] [address]  [phone]                 Patient Instructions:      BATH/SHOWER CHAIR FOR HOME USE     Order Specific Question Answer Comments   Height: 5' 9" (1.753 m)    Weight: 108.1 kg (238 lb 5.1 oz)    Does patient have medical equipment at home? none    Length of need (1-99 months): 1    Type: Without back      WALKER FOR HOME USE     Order Specific Question Answer Comments   Type of Walker: Adult (5'4"-6'6")    With wheels? Yes    Height: 5' 9" (1.753 m)    Weight: 108.1 kg (238 lb 5.1 oz)    Length of need (1-99 months): 1    Does patient have medical equipment at home? none    Please check all that apply: Patient's condition impairs ambulation.    Please check all that apply: Patient is unable to safely ambulate without equipment.      Diet Adult Regular     No driving until: "   Order Comments: No driving while taking sedating pain medication     Lifting restrictions   Order Comments: No lifting over 15 pounds for 4 weeks     Notify your health care provider if you experience any of the following:  temperature >100.4     Notify your health care provider if you experience any of the following:  persistent nausea and vomiting or diarrhea     Notify your health care provider if you experience any of the following:  severe uncontrolled pain     Notify your health care provider if you experience any of the following:  redness, tenderness, or signs of infection (pain, swelling, redness, odor or green/yellow discharge around incision site)     Notify your health care provider if you experience any of the following:  difficulty breathing or increased cough     Notify your health care provider if you experience any of the following:  increased confusion or weakness     No dressing needed   Order Comments: Shower or bathe normally with mild soap and water, pat incision dry. Glue will come off on its own     Activity as tolerated     Medications:  Reconciled Home Medications:      Medication List      START taking these medications    methocarbamol 500 MG Tab  Commonly known as:  ROBAXIN  Take 1 tablet (500 mg total) by mouth 3 (three) times daily. for 10 days     polyethylene glycol 17 gram/dose powder  Commonly known as:  GLYCOLAX  Mix 1 capful (17 g) with fluids and drink by mouth once daily.     * traMADol 50 mg tablet  Commonly known as:  ULTRAM  Take 1 tablet (50 mg total) by mouth every 6 (six) hours as needed for Pain.         * This list has 1 medication(s) that are the same as other medications prescribed for you. Read the directions carefully, and ask your doctor or other care provider to review them with you.            CONTINUE taking these medications    bisacodyl 5 mg EC tablet  Commonly known as:  DULCOLAX  Take by mouth 2 TABLETS AT 12 NOON THE DAY PRIOR TO SURGERY     dicyclomine  10 MG capsule  Commonly known as:  BENTYL  Take 10 mg by mouth 4 (four) times daily before meals and nightly.     metoclopramide HCl 10 MG tablet  Commonly known as:  REGLAN  Take 10 mg by mouth 4 (four) times daily.     ondansetron 4 MG Tbdl  Commonly known as:  ZOFRAN-ODT  Take 8 mg by mouth every 12 (twelve) hours.        STOP taking these medications    erythromycin base 500 MG tablet  Commonly known as:  E-MYCIN     HIBICLENS 4 % external liquid  Generic drug:  chlorhexidine     Listerine Liqd     magnesium citrate solution     neomycin 500 mg Tab  Commonly known as:  MYCIFRADIN        ASK your doctor about these medications    * traMADol 50 mg tablet  Commonly known as:  ULTRAM  Take 1 tablet (50 mg total) by mouth every 6 (six) hours as needed for pain.     * traMADol 50 mg tablet  Commonly known as:  ULTRAM  Utd         * This list has 2 medication(s) that are the same as other medications prescribed for you. Read the directions carefully, and ask your doctor or other care provider to review them with you.                Luz Elena Bustillo MD  General Surgery  Neuroendocrine Tumor Service  [facility]

## 2019-07-23 NOTE — PLAN OF CARE
Informed by bedside nurse Khloe that pt is ready for discharge, but unable to pay copay on Tramadol and Robaxin and is [facility].  Informed Dr. Nowak of pts request, verified with [facility] that they could not [facility] and pt informed he may have to wait until physician is out of the Operating room.  Pt agreeable.

## 2019-07-23 NOTE — PLAN OF CARE
Problem: Adult Inpatient Plan of Care  Goal: Plan of Care Review  Outcome: Ongoing (interventions implemented as appropriate)  Pt vitals were maintained, pt had some mild pain associated with coughing and activity/walking. Pt was given night time meds for pain relief. Pt had a small BM which was dark green/blueish only one formed stool. Pt had no N/v, pt is getting up more each day. No there complaints from pt

## 2019-07-23 NOTE — PLAN OF CARE
Problem: Physical Therapy Goal  Goal: Physical Therapy Goal  Goals to be met by: 2019     Patient will increase functional independence with mobility by performin. Supine to sit with Modified [place]  MET 19  2. Sit to stand transfer with Modified [place]  MET 19  3. Gait  x >200 feet with Modified [place] using no AD.      Outcome: Ongoing (interventions implemented as appropriate)  Goals 1 and 2 met

## 2019-07-24 NOTE — TELEPHONE ENCOUNTER
Returned patients call, scheduled him for a follow up visit with Dr. Plummer for in 4 weeks. Patient had no further questions at this time.

## 2019-07-24 NOTE — PHYSICIAN QUERY
PT Name: Aubrey Gallagher  MR #: [id number]    Physician Query Form - Pathology Findings Clarification     CDS Radha Rose RN, BSN        Contact Information:  [phone]    [email or web address]         This form is a permanent document in the medical record.     Query Date: July 24, 2019      By submitting this query, we are merely seeking further clarification of documentation.  Please utilize your independent clinical judgment when addressing the question(s) below.      The medical record contains the following:     Findings Supporting Clinical Information Location in Medical Record   SPECIMEN  1) Appendix.  2) Gallbladder.  3) Small bowel for tumor.  4) mesenteric lymph node.  5) highest mesenteric lymph node.    1. APPENDIX WITH NO EVIDENCE OF MALIGNANCY OR ACUTE INFLAMMATION IDENTIFIED    2. SECTIONS OF GALLBLADDER SHOWING CHRONIC CHOLECYSTITIS, NO EVIDENCE OF MALIGNANCY  IDENTIFIED.    3. SEGMENT OF SMALL BOWEL WITH MULTIPLE WELL-DIFFERENTIATED NEUROENDOCRINE TUMORS,  REGIONAL LYMPH NODES: 9 OUT OF 17 (9/17) LYMPH NODES IDENTIFIED WITH THIS SPECIMEN SHOW  METASTATIC NEUROENDOCRINE TUMOR    4. ONE MESENTERIC LYMPH NODE WITH METASTATIC NEUROENDOCRINE TUMOR (1/1).    5. SPECIMEN LABELED HIGHEST MESENTERIC LYMPH NODE SHOWS A FRAGMENT OF ADIPOSE TISSUE  WITH 1 OUT OF 9 (1/9) LYMPH NODES WITH METASTATIC NEUROENDOCRINE TUMOR                   Pre-Operative Diagnosis: Malignant carcinoid tumor of ileum      Post-Operative Diagnosis:    Malignant carcinoid tumor of ileum       multicentric partially obstructing  Chronic cholecystitis  chronic appendicitis  Mesenteric lymphadenopathy    PROCEDURES DONE:  1.  Exploratory laparotomy.  2.  Mckinney lymph node dissection.  3.  Appendectomy.  4.  Cholecystectomy.  5.  Small bowel resection of 140 cms  6.  Mesenteric lymphadenectomy.  7.  Ultrasound of the liver.  8.  Small bowel resection with ICG perfusion and infrared camera vision.  9.  Intralesional chemo with  5-FU.  10. Unlisted abdomen Pathology report final result 7/22 7/16 Op note      Please document the clinical significance of the Pathologists findings of    1. APPENDIX WITH NO EVIDENCE OF MALIGNANCY OR ACUTE INFLAMMATION IDENTIFIED    2. SECTIONS OF GALLBLADDER SHOWING CHRONIC CHOLECYSTITIS, NO EVIDENCE OF MALIGNANCY  IDENTIFIED.    3. SEGMENT OF SMALL BOWEL WITH MULTIPLE WELL-DIFFERENTIATED NEUROENDOCRINE TUMORS,  REGIONAL LYMPH NODES: 9 OUT OF 17 (9/17) LYMPH NODES IDENTIFIED WITH THIS SPECIMEN SHOW  METASTATIC NEUROENDOCRINE TUMOR    4. ONE MESENTERIC LYMPH NODE WITH METASTATIC NEUROENDOCRINE TUMOR (1/1).    5. SPECIMEN LABELED HIGHEST MESENTERIC LYMPH NODE SHOWS A FRAGMENT OF ADIPOSE TISSUE  WITH 1 OUT OF 9 (1/9) LYMPH NODES WITH METASTATIC NEUROENDOCRINE TUMOR      [  X ] I agree with the Pathology Findings   [   ] I do not agree with the Pathology Findings   [   ] Other/Clarification of Findings:   [  ] Clinically Undetermined       Please document in your progress notes daily for the duration of treatment until resolved and include in your discharge summary.

## 2019-08-19 ENCOUNTER — OFFICE VISIT (OUTPATIENT)
Dept: NEUROLOGY | Facility: HOSPITAL | Age: 44
End: 2019-08-19
Attending: SURGERY
Payer: OTHER GOVERNMENT

## 2019-08-19 VITALS
SYSTOLIC BLOOD PRESSURE: 120 MMHG | WEIGHT: 235.13 LBS | BODY MASS INDEX: 34.82 KG/M2 | DIASTOLIC BLOOD PRESSURE: 79 MMHG | HEART RATE: 77 BPM | TEMPERATURE: 99 F | HEIGHT: 69 IN

## 2019-08-19 DIAGNOSIS — C7A.012 MALIGNANT CARCINOID TUMOR OF THE ILEUM: ICD-10-CM

## 2019-08-19 DIAGNOSIS — Z09 POSTOP CHECK: Primary | ICD-10-CM

## 2019-08-19 PROCEDURE — 99213 OFFICE O/P EST LOW 20 MIN: CPT | Performed by: SURGERY

## 2019-08-19 NOTE — PROGRESS NOTES
S/p ex lap 7/16    Doing well    Eating well having BM    abd soft wound looks good  Path rrport noted low grade NET    voiding ok    To see ID for starting INH    Will do imaging in 3 months

## 2019-08-19 NOTE — PATIENT INSTRUCTIONS
Tumor markers in 3 months. Given to pt.    CT scheduled on November 7 at 1015am    Mri scheduled on November 7, 2019 at 1215pm    3 month follow up scheduled on Monday, November 11, 2019 at 1015am.

## 2019-11-04 ENCOUNTER — TELEPHONE (OUTPATIENT)
Dept: NEUROLOGY | Facility: HOSPITAL | Age: 44
End: 2019-11-04

## 2019-11-04 NOTE — TELEPHONE ENCOUNTER
LEE needed   Received: Today   Message Contents   Naga Carlos MD; REECE Carlos Staff             Good afternoon,     A LEE is need to add the code to the auth please fax over the LEE to [phone] with auth # [id number] and include all codes that might be needed it can take up to 5 business day for the codes to be added.     Thank you   Naga JACKSON   [id number]

## 2019-11-07 ENCOUNTER — HOSPITAL ENCOUNTER (OUTPATIENT)
Dept: RADIOLOGY | Facility: HOSPITAL | Age: 44
Discharge: HOME | End: 2019-11-07
Attending: SURGERY
Payer: COMMERCIAL

## 2019-11-07 ENCOUNTER — HOSPITAL ENCOUNTER (OUTPATIENT)
Dept: RADIOLOGY | Facility: HOSPITAL | Age: 44
Discharge: HOME | End: 2019-11-07
Attending: SURGERY
Payer: OTHER GOVERNMENT

## 2019-11-07 DIAGNOSIS — C7A.012 MALIGNANT CARCINOID TUMOR OF THE ILEUM: ICD-10-CM

## 2019-11-07 PROCEDURE — 25500020 PHARM REV CODE 255: Performed by: SURGERY

## 2019-11-07 PROCEDURE — 74177 CT ABD & PELVIS W/CONTRAST: CPT | Mod: TC

## 2019-11-07 PROCEDURE — 72197 MRI PELVIS W/O & W/DYE: CPT | Mod: 26,,, | Performed by: RADIOLOGY

## 2019-11-07 PROCEDURE — 72197 MRI ABDOMEN-PELVIS W W/O CONTRAST (XPD): ICD-10-PCS | Mod: 26,,, | Performed by: RADIOLOGY

## 2019-11-07 PROCEDURE — 74183 MRI ABD W/O CNTR FLWD CNTR: CPT | Mod: 26,,, | Performed by: RADIOLOGY

## 2019-11-07 PROCEDURE — 74177 CT ABDOMEN PELVIS WITH CONTRAST: ICD-10-PCS | Mod: 26,,, | Performed by: RADIOLOGY

## 2019-11-07 PROCEDURE — 74177 CT ABD & PELVIS W/CONTRAST: CPT | Mod: 26,,, | Performed by: RADIOLOGY

## 2019-11-07 PROCEDURE — A9585 GADOBUTROL INJECTION: HCPCS | Performed by: SURGERY

## 2019-11-07 PROCEDURE — 74183 MRI ABDOMEN-PELVIS W W/O CONTRAST (XPD): ICD-10-PCS | Mod: 26,,, | Performed by: RADIOLOGY

## 2019-11-07 PROCEDURE — 72197 MRI PELVIS W/O & W/DYE: CPT | Mod: TC

## 2019-11-07 RX ORDER — GADOBUTROL 604.72 MG/ML
10 INJECTION INTRAVENOUS
Status: COMPLETED | OUTPATIENT
Start: 2019-11-07 | End: 2019-11-07

## 2019-11-07 RX ADMIN — GADOBUTROL 10 ML: 604.72 INJECTION INTRAVENOUS at 02:11

## 2019-11-07 RX ADMIN — IOHEXOL 100 ML: 350 INJECTION, SOLUTION INTRAVENOUS at 11:11

## 2019-11-07 RX ADMIN — IOHEXOL 30 ML: 350 INJECTION, SOLUTION INTRAVENOUS at 10:11

## 2019-11-11 ENCOUNTER — OFFICE VISIT (OUTPATIENT)
Dept: NEUROLOGY | Facility: HOSPITAL | Age: 44
End: 2019-11-11
Attending: SURGERY
Payer: COMMERCIAL

## 2019-11-11 VITALS
DIASTOLIC BLOOD PRESSURE: 73 MMHG | BODY MASS INDEX: 36.7 KG/M2 | HEIGHT: 69 IN | TEMPERATURE: 99 F | HEART RATE: 82 BPM | WEIGHT: 247.81 LBS | SYSTOLIC BLOOD PRESSURE: 121 MMHG

## 2019-11-11 DIAGNOSIS — C7B.8 SECONDARY NEUROENDOCRINE TUMOR OF DISTANT LYMPH NODES: ICD-10-CM

## 2019-11-11 DIAGNOSIS — C7A.012 MALIGNANT CARCINOID TUMOR OF ILEUM: Primary | ICD-10-CM

## 2019-11-11 LAB
5-HIAA, PLASMA (NEUROEND): NORMAL
ALBUMIN SERPL-MCNC: 4.1 G/DL (ref 3.6–5.1)
ALBUMIN/GLOB SERPL: 1.5 (CALC) (ref 1–2.5)
ALP SERPL-CCNC: 73 U/L (ref 40–115)
ALT SERPL-CCNC: 26 U/L (ref 9–46)
AST SERPL-CCNC: 19 U/L (ref 10–40)
BASOPHILS # BLD AUTO: 42 CELLS/UL (ref 0–200)
BASOPHILS NFR BLD AUTO: 0.8 %
BILIRUB SERPL-MCNC: 0.3 MG/DL (ref 0.2–1.2)
BUN SERPL-MCNC: 13 MG/DL (ref 7–25)
BUN/CREAT SERPL: ABNORMAL (CALC) (ref 6–22)
CALCIUM SERPL-MCNC: 9.4 MG/DL (ref 8.6–10.3)
CHLORIDE SERPL-SCNC: 104 MMOL/L (ref 98–110)
CO2 SERPL-SCNC: 30 MMOL/L (ref 20–32)
CREAT SERPL-MCNC: 1.05 MG/DL (ref 0.6–1.35)
EOSINOPHIL # BLD AUTO: 80 CELLS/UL (ref 15–500)
EOSINOPHIL NFR BLD AUTO: 1.5 %
ERYTHROCYTE [DISTWIDTH] IN BLOOD BY AUTOMATED COUNT: 13.8 % (ref 11–15)
GFRSERPLBLD MDRD-ARVRAT: 85 ML/MIN/1.73M2
GLOBULIN SER CALC-MCNC: 2.8 G/DL (CALC) (ref 1.9–3.7)
GLUCOSE SERPL-MCNC: 104 MG/DL (ref 65–99)
HCT VFR BLD AUTO: 45.4 % (ref 38.5–50)
HGB BLD-MCNC: 14.6 G/DL (ref 13.2–17.1)
LYMPHOCYTES # BLD AUTO: 1542 CELLS/UL (ref 850–3900)
LYMPHOCYTES NFR BLD AUTO: 29.1 %
MCH RBC QN AUTO: 27.3 PG (ref 27–33)
MCHC RBC AUTO-ENTMCNC: 32.2 G/DL (ref 32–36)
MCV RBC AUTO: 85 FL (ref 80–100)
MONOCYTES # BLD AUTO: 472 CELLS/UL (ref 200–950)
MONOCYTES NFR BLD AUTO: 8.9 %
NEUROKININ A: NORMAL
NEUTROPHILS # BLD AUTO: 3164 CELLS/UL (ref 1500–7800)
NEUTROPHILS NFR BLD AUTO: 59.7 %
PANCREASTATIN: NORMAL
PLATELET # BLD AUTO: 279 THOUSAND/UL (ref 140–400)
PMV BLD REES-ECKER: 10.1 FL (ref 7.5–12.5)
POTASSIUM SERPL-SCNC: 4.4 MMOL/L (ref 3.5–5.3)
PROT SERPL-MCNC: 6.9 G/DL (ref 6.1–8.1)
RBC # BLD AUTO: 5.34 MILLION/UL (ref 4.2–5.8)
SEROTONIN SER-MCNC: 340 NG/ML (ref 56–244)
SODIUM SERPL-SCNC: 140 MMOL/L (ref 135–146)
WBC # BLD AUTO: 5.3 THOUSAND/UL (ref 3.8–10.8)

## 2019-11-11 PROCEDURE — 99213 OFFICE O/P EST LOW 20 MIN: CPT | Performed by: SURGERY

## 2019-11-11 NOTE — PATIENT INSTRUCTIONS
Your next appt with Dr. Guardado is 5/11/20 at 1020 am    Your lab work is 3 months 2/11/20 at 1020 am or before 430 pm    Your MRI and Gallium is 6 months 5/6/20 at 1000 am and 100 pm

## 2019-11-11 NOTE — PROGRESS NOTES
"NOLAN[place]:  [facility]  A collaboration between [facility] and [facility]      PATIENT: Aubrey Gallagher  MRN: [id number]  DATE: 11/11/2019    Subjective:      Chief Complaint: Follow-up (3 month follow up)      Doing well overall  No compliants    Recovered from surgery   Back to work  Vitals: Blood pressure 121/73, pulse 82, temperature 98.7 °F (37.1 °C), temperature source Oral, height 5' 9" (1.753 m), weight 112.4 kg (247 lb 12.8 oz).    ECOG Score: 0 - Asymptomatic    Diagnosis: No diagnosis found.     Interval History:     Oncologic History:   Oncologic History    Oncologic Treatment    Pathology      Past Medical History:  Past Medical History:   Diagnosis Date    Mesenteric mass        Past Surgical History:  Past Surgical History:   Procedure Laterality Date    APPENDECTOMY  7/16/2019    Procedure: APPENDECTOMY;  Surgeon: Kavitha Carlos MD;  Location: [facility] OR;  Service: General;;    CHOLECYSTECTOMY  7/16/2019    Procedure: CHOLECYSTECTOMY;  Surgeon: Kavitha Carlos MD;  Location: [facility] OR;  Service: General;;    COLONOSCOPY         Family History:  History reviewed. No pertinent family history.    Allergies:  Epinephrine and Ciprofloxacin    Medications:   Current Outpatient Medications   Medication Sig    bisacodyl (DULCOLAX) 5 mg EC tablet Take by mouth 2 TABLETS AT 12 NOON THE DAY PRIOR TO SURGERY (Patient not taking: Reported on 11/11/2019)    dicyclomine (BENTYL) 10 MG capsule Take 10 mg by mouth 4 (four) times daily before meals and nightly.    metoclopramide HCl (REGLAN) 10 MG tablet Take 10 mg by mouth 4 (four) times daily.    ondansetron (ZOFRAN-ODT) 4 MG TbDL Take 8 mg by mouth every 12 (twelve) hours.    polyethylene glycol (GLYCOLAX) 17 gram/dose powder Mix 1 capful (17 g) with fluids and drink by mouth once daily. (Patient not taking: Reported on 11/11/2019)    traMADol (ULTRAM) " 50 mg tablet Take 1 tablet (50 mg total) by mouth every 6 (six) hours as needed for Pain. (Patient not taking: Reported on 11/11/2019)    traMADol (ULTRAM) 50 mg tablet Take 1 tablet (50 mg total) by mouth every 6 (six) hours as needed for pain. (Patient not taking: Reported on 11/11/2019)    traMADol (ULTRAM) 50 mg tablet Utd     No current facility-administered medications for this visit.         Review of Systems   Constitutional: Negative for activity change, chills, diaphoresis, fatigue and unexpected weight change.   HENT: Negative for congestion, dental problem, drooling, ear discharge, ear pain, facial swelling, nosebleeds, postnasal drip, rhinorrhea, sinus pressure, sinus pain, sneezing and sore throat.    Eyes: Negative.  Negative for photophobia, pain, redness, itching and visual disturbance.   Respiratory: Negative for apnea, cough, choking, chest tightness, shortness of breath, wheezing and stridor.    Cardiovascular: Negative for chest pain, palpitations and leg swelling.   Endocrine: Negative.    Genitourinary: Negative for discharge, enuresis, flank pain, frequency, hematuria, penile pain, penile swelling, scrotal swelling, testicular pain and urgency.   Musculoskeletal: Negative for arthralgias, back pain, gait problem, joint swelling, myalgias and neck stiffness.   Skin: Negative for pallor, rash and wound.   Allergic/Immunologic: Negative for environmental allergies, food allergies and immunocompromised state.   Neurological: Negative.  Negative for tremors, syncope, weakness, light-headedness, numbness and headaches.   Hematological: Negative for adenopathy.   Psychiatric/Behavioral: Negative for behavioral problems, confusion and decreased concentration.      Objective:      Physical Exam   Constitutional: He is oriented to person, place, and time. He appears well-developed and well-nourished.   HENT:   Head: Normocephalic and atraumatic.   Right Ear: External ear normal.   Left Ear: External  ear normal.   Mouth/Throat: Oropharynx is clear and moist.   Eyes: Pupils are equal, round, and reactive to light. Conjunctivae and EOM are normal.   Neck: Normal range of motion.   Cardiovascular: Normal rate and regular rhythm.   Pulmonary/Chest: Effort normal and breath sounds normal.   Abdominal: Soft. Bowel sounds are normal. He exhibits no distension and no mass. There is no tenderness. There is no rebound and no guarding. No hernia.   Incision healed well no hernia   Musculoskeletal: Normal range of motion.   Neurological: He is alert and oriented to person, place, and time.   Skin: Skin is warm and dry.   Psychiatric: He has a normal mood and affect. His behavior is normal.      Assessment:       No diagnosis found.    Laboratory Data:   Results for orders placed or performed in visit on 11/07/19   Comprehensive metabolic panel   Result Value Ref Range    Glucose 104 (H) 65 - 99 mg/dL    BUN, Bld 13 7 - 25 mg/dL    Creatinine 1.05 0.60 - 1.35 mg/dL    eGFR if non  85 > OR = 60 mL/min/1.73m2    eGFR if African American 98 > OR = 60 mL/min/1.73m2    BUN/Creatinine Ratio NOT APPLICABLE 6 - 22 (calc)    Sodium 140 135 - 146 mmol/L    Potassium 4.4 3.5 - 5.3 mmol/L    Chloride 104 98 - 110 mmol/L    CO2 30 20 - 32 mmol/L    Calcium 9.4 8.6 - 10.3 mg/dL    Total Protein 6.9 6.1 - 8.1 g/dL    Albumin 4.1 3.6 - 5.1 g/dL    Globulin, Total 2.8 1.9 - 3.7 g/dL (calc)    Albumin/Globulin Ratio 1.5 1.0 - 2.5 (calc)    Total Bilirubin 0.3 0.2 - 1.2 mg/dL    Alkaline Phosphatase 73 40 - 115 U/L    AST 19 10 - 40 U/L    ALT 26 9 - 46 U/L   Serotonin serum   Result Value Ref Range    Serotonin, Serum     CBC auto differential   Result Value Ref Range    WBC 5.3 3.8 - 10.8 Thousand/uL    RBC 5.34 4.20 - 5.80 Million/uL    Hemoglobin 14.6 13.2 - 17.1 g/dL    Hematocrit 45.4 38.5 - 50.0 %    Mean Corpuscular Volume 85.0 80.0 - 100.0 fL    Mean Corpuscular Hemoglobin 27.3 27.0 - 33.0 pg    Mean Corpuscular  Hemoglobin Conc 32.2 32.0 - 36.0 g/dL    RDW 13.8 11.0 - 15.0 %    Platelets 279 140 - 400 Thousand/uL    MPV 10.1 7.5 - 12.5 fL    Neutrophils Absolute 3,164 1,500 - 7,800 cells/uL    Lymph # 1,542 850 - 3,900 cells/uL    Mono # 472 200 - 950 cells/uL    Eos # 80 15 - 500 cells/uL    Baso # 42 0 - 200 cells/uL    Neutrophils Relative 59.7 %    Lymph% 29.1 %    Mono% 8.9 %    Eosinophil% 1.5 %    Basophil% 0.8 %   Neurokinin A (Substance K)   Result Value Ref Range    Neurokinin A     5-HIAA Plasma (Neuoendocrine)   Result Value Ref Range    5-HIAA, Plasma (Neuroend)     Pancreastatin   Result Value Ref Range    Pancreastatin         Scans:   MRI Abdomen-Pelvis w w/o Contrast (XPD)  Narrative: EXAMINATION:  MRI ABDOMEN-PELVIS W W/O CONTRAST (XPD)    CLINICAL HISTORY:  neoplasm;  Malignant carcinoid tumor of the ileum    TECHNIQUE:  Multiplanar multi images of the abdomen and pelvis obtained, pre and post-contrast images acquired.  The patient received 10 cc of Gadavist IV contrast.    COMPARISON:  02/20/2019    FINDINGS:  Pelvis MRI demonstrate no abnormalities.    The lung bases are clear.    Four nonspecific lesion right hepatic lobe, the largest within the segment 6 measures 1.1 cm.  Three of them demonstrate  delayed enhancement possibly hemangiomas.  One demonstrates no significant enhancement possibly a small cyst.  The gallbladder has been removed.    The stomach, pancreas, spleen, adrenal glands and kidneys appear normal.  The aorta tapers normally.  No para-aortic lymphadenopathy.    The mesentery demonstrates residual soft tissue mass 1.0 cm series 1901, image 83.    No ascites.    Nonnodular smooth enhancement of the liver capsule, it is nonspecific.    The osseous structures demonstrate no osseous lesions.  There is hepatic steatosis.  Impression: Hepatic steatosis.    Four nonspecific small liver lesions 3 of them demonstrating delayed enhancement possibly small hemangiomas, 1 do not demonstrate  enhancement possibly a cyst, they are not significantly changed.    Residual small enhancing soft tissue mesenteric mass 1.0 cm, series 1901, image 83.    Nonspecific smooth subtle enhancement of the liver capsule.    Cholecystectomy.    RECIST SUMMARY:    Date of prior examination for comparison: None    Lesion 1: Mid mesentery residual small mass.  1.0 cm. Series 1901 image 83.  Not seen on the prior axial MRI    Electronically signed by: Amada Lainez MD  Date:    11/07/2019  Time:    14:43  CT Abdomen Pelvis With Contrast - Triple Phase  Narrative: EXAMINATION:  CT ABDOMEN PELVIS WITH CONTRAST    CLINICAL HISTORY:  neoplasm; Malignant carcinoid tumor of the ileum    TECHNIQUE:  Low dose axial images, sagittal and coronal reformations were obtained from the lung bases to the pubic symphysis following the IV administration of 100 mL of Omnipaque 350 and the oral administration of 30 mL of Omnipaque 350.    COMPARISON:  07/08/2019    FINDINGS:  The lung bases are clear.There are 3 hypoattenuating lesions within the right hepatic lobe segment 5 and 6, the largest 0.8 cm, series 3, image 45, image 47 and image 50.    There are not seen on the prior study, there were seen on the prior MRI 02/20/2019.  The biliary ducts are not dilated.  The gallbladder is removed.  The stomach, pancreas, spleen, adrenal glands and kidneys appear normal.  The aorta tapers normally, no para-aortic lymphadenopathy.  Surgical bowel suture in the mid abdomen region.  Previously seen mesenteric mass have significantly decreased in size, one of the mass measures 0.9 cm.  No new mesenteric mass seen.  Moderate stool retention, no inflammatory changes of the bowel seen, no bowel dilation.  The bladder, prostate and seminal vesicles appear normal.  The inguinal regions appear normal.  The osseous structures demonstrate no osseous lesions.  Impression: Significant interval improvement compared to the prior exam, the mesenteric masses are  smaller.  The largest mass is not seen, the smaller mass is now 0.9 cm.    No new masses.    Nonspecific small hypoattenuating lesions within the right lobe of the liver, they are better seen on today's study, there were present on 02/20/2019 MRI exam.    Prior bowel surgery.    Cholecystectomy.    RECIST SUMMARY:    Date of prior examination for comparison: 07/08/2019    Lesion 1: Mid mesentery mass.  No longer seen, only small surgical clips noted in the mid mesentery.    Lesion 2: Mid mesentery mass.  0.9 cm cm. Series series 3 image 70.  Prior measurement 2.4 cm.  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  He is doing well overall.  Had a follow-up scans done which showed a small nodule at the mesentery most likely related to the postop change.    Clinically doing well physically active    Plan:       No further intervention necessary for now  Carcinoid labs in 3 months time  Repeat MRI and gallium scan about 6 months  Follow-up in 6 months time  Continue regular activity and normal diet          MILENA Plummer MD, FACS   Associate Professor of Surgery, [facility]   Neuroendocrine Surgery, Hepatic/Pancreatic & General Surgery   [address]   [place] [address]   ph. [phone]; [phone]   fax. [phone]

## 2019-11-29 ENCOUNTER — TELEPHONE (OUTPATIENT)
Dept: NEUROLOGY | Facility: HOSPITAL | Age: 44
End: 2019-11-29

## 2019-12-11 ENCOUNTER — TELEPHONE (OUTPATIENT)
Dept: NEUROLOGY | Facility: HOSPITAL | Age: 44
End: 2019-12-11

## 2019-12-11 NOTE — TELEPHONE ENCOUNTER
Spoke with patient - Rachael had charged $200 while they are reprocessing his last bill.  I called [facility] to confirm the authorization number and that it is being reprocessed.  Leigha at [facility] confirmed this, that the reprocessing can take 45-60 days, and told me that the patient can call to contest the charge to be returned to his credit card if he chooses.  I left the patient a message stating all of the above and to call if he has any further questions.

## 2020-02-13 ENCOUNTER — LAB VISIT (OUTPATIENT)
Dept: LAB | Facility: HOSPITAL | Age: 45
End: 2020-02-13
Attending: SURGERY
Payer: OTHER GOVERNMENT

## 2020-02-13 DIAGNOSIS — C7A.012 MALIGNANT CARCINOID TUMOR OF ILEUM: ICD-10-CM

## 2020-02-13 DIAGNOSIS — C7B.8 SECONDARY NEUROENDOCRINE TUMOR OF DISTANT LYMPH NODES: ICD-10-CM

## 2020-02-13 PROCEDURE — 82542 COL CHROMOTOGRAPHY QUAL/QUAN: CPT

## 2020-02-13 PROCEDURE — 36415 COLL VENOUS BLD VENIPUNCTURE: CPT

## 2020-02-13 PROCEDURE — 83519 RIA NONANTIBODY: CPT | Mod: 59

## 2020-02-13 PROCEDURE — 84260 ASSAY OF SEROTONIN: CPT

## 2020-02-13 PROCEDURE — 83519 RIA NONANTIBODY: CPT

## 2020-02-18 LAB — SEROTONIN: 441 NG/ML

## 2020-02-21 LAB — PANCREASTATIN SERPL-MCNC: 716.9 PG/ML (ref 100–288.7)

## 2020-03-05 LAB
5-HIAA, PLASMA (NEUROEND): 12 NG/ML (ref 0–22)
NEUROKININ A SERPL-MCNC: 16 PG/ML (ref 0–40)

## 2020-03-24 ENCOUNTER — PATIENT MESSAGE (OUTPATIENT)
Dept: NEUROLOGY | Facility: HOSPITAL | Age: 45
End: 2020-03-24

## 2020-04-21 DIAGNOSIS — Z01.84 ANTIBODY RESPONSE EXAMINATION: ICD-10-CM

## 2020-05-05 ENCOUNTER — TELEPHONE (OUTPATIENT)
Dept: NEUROLOGY | Facility: HOSPITAL | Age: 45
End: 2020-05-05

## 2020-05-05 NOTE — TELEPHONE ENCOUNTER
----- Message from Joyce Oneal sent at 5/5/2020  1:48 PM CDT -----  Contact: self [phone]  KORY - Patient is cll to change appointment from Monday 5/11/20 to Tuesday 5/12/20. Please call

## 2020-05-05 NOTE — TELEPHONE ENCOUNTER
Pt requested to rescheduled clinic appt from 5/11/20 to 5/12/20, due to not having anyone to watch his son.   Pt informed Dr. Guardado has clinic on Monday and Thursday, Tuesday is a surgery day.  Virtual appt offered to pt on 5/11/20 at 240pm. Pt given virtual appt instructions and acknowledged understanding. Pt was greatful to be offered virtual appt.

## 2020-05-06 ENCOUNTER — HOSPITAL ENCOUNTER (OUTPATIENT)
Dept: RADIOLOGY | Facility: HOSPITAL | Age: 45
Discharge: HOME | End: 2020-05-06
Attending: SURGERY
Payer: COMMERCIAL

## 2020-05-06 DIAGNOSIS — C7B.8 SECONDARY NEUROENDOCRINE TUMOR OF DISTANT LYMPH NODES: ICD-10-CM

## 2020-05-06 DIAGNOSIS — C7A.012 MALIGNANT CARCINOID TUMOR OF ILEUM: ICD-10-CM

## 2020-05-06 PROCEDURE — 25500020 PHARM REV CODE 255: Performed by: SURGERY

## 2020-05-06 PROCEDURE — 74183 MRI ABD W/O CNTR FLWD CNTR: CPT | Mod: TC

## 2020-05-06 PROCEDURE — 78815 PET IMAGE W/CT SKULL-THIGH: CPT | Mod: 26,PS,, | Performed by: RADIOLOGY

## 2020-05-06 PROCEDURE — 74183 MRI ABDOMEN W WO CONTRAST: ICD-10-PCS | Mod: 26,,, | Performed by: RADIOLOGY

## 2020-05-06 PROCEDURE — A9587 GALLIUM GA-68: HCPCS

## 2020-05-06 PROCEDURE — 78815 PET IMAGE W/CT SKULL-THIGH: CPT | Mod: TC

## 2020-05-06 PROCEDURE — A9585 GADOBUTROL INJECTION: HCPCS | Performed by: SURGERY

## 2020-05-06 PROCEDURE — 74183 MRI ABD W/O CNTR FLWD CNTR: CPT | Mod: 26,,, | Performed by: RADIOLOGY

## 2020-05-06 PROCEDURE — 78815 NM PET 68GA DOTATATE WHOLE BODY: ICD-10-PCS | Mod: 26,PS,, | Performed by: RADIOLOGY

## 2020-05-06 RX ORDER — GADOBUTROL 604.72 MG/ML
10 INJECTION INTRAVENOUS
Status: COMPLETED | OUTPATIENT
Start: 2020-05-06 | End: 2020-05-06

## 2020-05-06 RX ADMIN — GADOBUTROL 10 ML: 604.72 INJECTION INTRAVENOUS at 03:05

## 2020-05-08 NOTE — PROGRESS NOTES
NOLANETS:  [facility]  A collaboration between [facility] and [facility]      PATIENT: Aubrey Gallagher  MRN: [id number]  DATE: 5/8/2020    Subjective:      Chief Complaint:6 month follow up with Gallium and MRI        Vitals: There were no vitals taken for this visit.    ECOG Score: 0 - Asymptomatic    Diagnosis: No diagnosis found.     Interval History:     Oncologic History:   Oncologic History Unknown primary   Oncologic Treatment    Pathology      Past Medical History:  Past Medical History:   Diagnosis Date    Mesenteric mass        Past Surgical History:  Past Surgical History:   Procedure Laterality Date    APPENDECTOMY  7/16/2019    Procedure: APPENDECTOMY;  Surgeon: Kavitha Carlos MD;  Location: [facility] OR;  Service: General;;    CHOLECYSTECTOMY  7/16/2019    Procedure: CHOLECYSTECTOMY;  Surgeon: Kavitha Carlos MD;  Location: [facility] OR;  Service: General;;    COLONOSCOPY         Family History:  No family history on file.    Allergies:  Epinephrine and Ciprofloxacin    Medications:   Current Outpatient Medications   Medication Sig    bisacodyl (DULCOLAX) 5 mg EC tablet Take by mouth 2 TABLETS AT 12 NOON THE DAY PRIOR TO SURGERY (Patient not taking: Reported on 11/11/2019)    dicyclomine (BENTYL) 10 MG capsule Take 10 mg by mouth 4 (four) times daily before meals and nightly.    metoclopramide HCl (REGLAN) 10 MG tablet Take 10 mg by mouth 4 (four) times daily.    ondansetron (ZOFRAN-ODT) 4 MG TbDL Take 8 mg by mouth every 12 (twelve) hours.    polyethylene glycol (GLYCOLAX) 17 gram/dose powder Mix 1 capful (17 g) with fluids and drink by mouth once daily. (Patient not taking: Reported on 11/11/2019)    traMADol (ULTRAM) 50 mg tablet Take 1 tablet (50 mg total) by mouth every 6 (six) hours as needed for Pain. (Patient not taking: Reported on 11/11/2019)    traMADol (ULTRAM) 50 mg tablet Take 1  tablet (50 mg total) by mouth every 6 (six) hours as needed for pain. (Patient not taking: Reported on 11/11/2019)    traMADol (ULTRAM) 50 mg tablet Utd     No current facility-administered medications for this visit.         Review of Systems   Constitutional: Negative for activity change, chills, diaphoresis, fatigue and unexpected weight change.   HENT: Negative for congestion, dental problem, drooling, ear discharge, ear pain, facial swelling, nosebleeds, postnasal drip, rhinorrhea, sinus pressure, sinus pain, sneezing and sore throat.    Eyes: Negative.  Negative for photophobia, pain, redness, itching and visual disturbance.   Respiratory: Negative for apnea, cough, choking, chest tightness, shortness of breath, wheezing and stridor.    Cardiovascular: Negative for chest pain, palpitations and leg swelling.   Endocrine: Negative.    Genitourinary: Negative for discharge, enuresis, flank pain, frequency, hematuria, penile pain, penile swelling, scrotal swelling, testicular pain and urgency.   Musculoskeletal: Negative for arthralgias, back pain, gait problem, joint swelling, myalgias and neck stiffness.   Skin: Negative for pallor, rash and wound.   Allergic/Immunologic: Negative for environmental allergies, food allergies and immunocompromised state.   Neurological: Negative.  Negative for tremors, syncope, weakness, light-headedness, numbness and headaches.   Hematological: Negative for adenopathy.   Psychiatric/Behavioral: Negative for behavioral problems, confusion and decreased concentration.      Objective:      Physical Exam   Constitutional: He is oriented to person, place, and time. He appears well-developed and well-nourished.   HENT:   Nose: Nose normal.   Mouth/Throat: Oropharynx is clear and moist.   Pulmonary/Chest: Effort normal.   Abdominal: He exhibits no distension and no mass. There is no tenderness. There is no rebound and no guarding. No hernia.   Incision healed well no hernia    Musculoskeletal: Normal range of motion.   Neurological: He is alert and oriented to person, place, and time.   Psychiatric: He has a normal mood and affect. His behavior is normal.      Assessment:       No diagnosis found.    Laboratory Data:   Results for orders placed or performed in visit on 02/13/20   5-HIAA Plasma (Neuroendocrine)   Result Value Ref Range    5-HIAA, Plasma (Neuroend) 12 0 - 22 ng/mL   SEROTONIN SERUM   Result Value Ref Range    Serotonin 441 (H) <=230 ng/mL   Pancreastatin   Result Value Ref Range    Pancreastatin 716.9 (H) 100.0 - 288.7 pg/mL   Neurokinin A   Result Value Ref Range    Neurokinin A 16 0 - 40 pg/mL       Scans:      NM PET 68GA DOTATATE WHOLE BODY 5/6/20    CLINICAL HISTORY:  Malignant carcinoid tumor of ileum; Malignant carcinoid tumor of the ileum    TECHNIQUE:  4.2 mCi of Ga-68 DOTA-TREVIÑO was administered intravenously in the left antecubital fossa. After an approximately 60 min distribution time, PET/CT images were acquired from the skull vertex to the mid thigh. Transmission images were acquired to correct for attenuation using a whole body low-dose CT scan without contrast with the arms positioned above the head.    COMPARISON:  GA 68 PET-CT 03/14/2019    FINDINGS:  Quality of the study: Adequate.    In this patient status post lymphadenectomy with known small bowel carcinoid status post small bowel resection there is no definite residual or recurrent mesenteric lesion.  As seen on image 117 about a surgical clip there is mild soft tissue thickening with associated SUV max of 2.6, most likely postoperative change.    No definite liver lesions are seen on the CT portion of the exam.  There is normal heterogeneity of activity within the liver with no definite somatostatin avid lesions identified.    Physiologic distribution of the tracer is seen within the pituitary, salivary, and thyroid glands, stomach, liver, pancreas uncinate process, spleen, adrenal glands,  tract,  and bowel.    Incidental CT findings: None.      Impression       No PET/CT findings to suggest residual somatostatin receptor avid disease.        MRI ABDOMEN W WO CONTRAST 5/6/20    CLINICAL HISTORY:  Malignant carcinoid tumor of ileum;  Malignant carcinoid tumor of the ileum    TECHNIQUE:  Multiplanar multisequence routine MRI abdomen obtained with the administration of 10 mL Gadavist IV contrast.    COMPARISON:  11/07/2019    FINDINGS:  Previously noted mesenteric mass not clearly identified.    Several subcentimeter foci of restricted diffusion, unchanged.  Largest is in the inferior right hepatic lobe with delayed enhancement, probable hemangioma, stable.  No new liver lesions.  The main portal veins are patent.  Hepatic steatosis noted.    No biliary or pancreatic ductal dilatation.  Splenic size within normal limits.  The adrenal glands and kidneys are unremarkable.  No renal masses or hydronephrosis.  Abdominal aorta tapers normally.  No periaortic lymphadenopathy.    Visualized portion of the spinal is unremarkable.      Impression       No detrimental changes from the 11/07/2019 exam.  Subcentimeter liver lesions, stable.    Prior cholecystectomy.    Fatty change in the liver.    RECIST SUMMARY:    Lesion 1: Mesenteric mass.  Not visualized on today's exam.    Previously measured 1.0 cm.             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  Doing well with no other complaints.  He is eating well and does not have any eeg I issues other than sporadic diarrhea is active.    He had a recent gallium scan which shows no uptake.  The MRI also shows the no evidence of disease.  Previously in November there was an area in the mesentery.  That was inflammatory and that has resolved completely    Biochemically he had a serum markers done in February which shows a increase in the pancreas statin and serotonin levels.  These labs were sent to [place].   Previously he had lab work done in the middle of last year.  There is discrepancy in the lab values which needs to be followed up    He is currently not taking any medications          Plan:     repeat serum biomarkers next month  Repeat CT MRI in 6 months and follow-up 6 months  Recommended to follow proper diet and activities          MILENA Plummer MD, FACS   Associate Professor of Surgery, [facility]   Neuroendocrine Surgery, Hepatic/Pancreatic & General Surgery   [address], [address]   [place] [address]   ph. [phone]; [phone]   fax. [phone]

## 2020-05-11 ENCOUNTER — OFFICE VISIT (OUTPATIENT)
Dept: NEUROLOGY | Facility: HOSPITAL | Age: 45
End: 2020-05-11
Attending: SURGERY
Payer: OTHER GOVERNMENT

## 2020-05-11 DIAGNOSIS — C7A.012 MALIGNANT CARCINOID TUMOR OF ILEUM: Primary | ICD-10-CM

## 2020-05-11 DIAGNOSIS — C7B.01 SECONDARY CARCINOID TUMOR OF DISTANT LYMPH NODES: ICD-10-CM

## 2020-05-11 DIAGNOSIS — D49.89 NEOPLASM OF ABDOMEN: ICD-10-CM

## 2020-05-11 NOTE — PATIENT INSTRUCTIONS
Pt contacted via telephone.: You will be discussed in Tumor Board in the next week or two.  You will be contacted with Tumor Board recommendations.    Tumor Markers at [facility] next month.(June 2020) Orders placed in mail.    CT and mri due in 6 months.  CT scheduled on 11/11/20 at 1pm and mri on 11/11/20 at 315pm.    6 months follow up appt with Dr. Guardado on Monday, November 16, 2020 at 140pm.

## 2020-05-19 ENCOUNTER — CONFERENCE (OUTPATIENT)
Dept: NEUROLOGY | Facility: HOSPITAL | Age: 45
End: 2020-05-19

## 2020-05-21 DIAGNOSIS — Z01.84 ANTIBODY RESPONSE EXAMINATION: ICD-10-CM

## 2020-06-15 DIAGNOSIS — C7A.012 MALIGNANT CARCINOID TUMOR OF ILEUM: Primary | ICD-10-CM

## 2020-06-20 DIAGNOSIS — Z01.84 ANTIBODY RESPONSE EXAMINATION: ICD-10-CM

## 2020-07-01 ENCOUNTER — TELEPHONE (OUTPATIENT)
Dept: NEUROLOGY | Facility: HOSPITAL | Age: 45
End: 2020-07-01

## 2020-07-01 NOTE — TELEPHONE ENCOUNTER
----- Message from Meagan Sears sent at 7/1/2020  2:39 PM CDT -----  Regarding: 3 month labs  Type:  Needs Medical Advice    Who Called:  patient  Would the patient rather a call back or a response via [facility]?  Call back  Best Call Back Number:  [phone]  Additional Information:  called to status of his 3 month blood labs and also about a bill he received that was to have been taken care of months ago

## 2020-07-01 NOTE — TELEPHONE ENCOUNTER
Pt states that he is suppose to have labs every three months and they have not been ordered or scheduled . I looked in the chart could no find documentation on where they are suppose to be done every 3 months. Told pt that I would check with Dr. Guardado tomorrow and then call pt with what Dr. Guardado wants done and we will set it up. Will give the pt info about a long standing bill that codes were changed and should have been taken care of, will give his name to Sol. Pt verbalized understanding

## 2020-07-02 ENCOUNTER — TELEPHONE (OUTPATIENT)
Dept: NEUROLOGY | Facility: HOSPITAL | Age: 45
End: 2020-07-02

## 2020-07-02 NOTE — TELEPHONE ENCOUNTER
Spoke with pt and let him know to come by the office to  paperwork for labs at [facility] when he is ready to do lab work. Pt verbalized understanding

## 2020-07-06 LAB
EXT 24 HR UR METANEPHRINE: ABNORMAL
EXT 24 HR UR NORMETANEPHRINE: ABNORMAL
EXT 24 HR UR NORMETANEPHRINE: ABNORMAL
EXT 25 HYDROXY VIT D2: ABNORMAL
EXT 25 HYDROXY VIT D3: ABNORMAL
EXT 5 HIAA 24 HR URINE: ABNORMAL
EXT 5 HIAA BLOOD: 7 NG/ML (ref 0–22)
EXT ACTH: ABNORMAL
EXT AFP: ABNORMAL
EXT ALBUMIN: ABNORMAL
EXT ALKALINE PHOSPHATASE: ABNORMAL
EXT ALT: ABNORMAL
EXT AMYLASE: ABNORMAL
EXT ANTI ISLET CELL AB: ABNORMAL
EXT ANTI PARIETAL CELL AB: ABNORMAL
EXT ANTI THYROID AB: ABNORMAL
EXT AST: ABNORMAL
EXT BILIRUBIN DIRECT: ABNORMAL
EXT BILIRUBIN TOTAL: ABNORMAL
EXT BK VIRUS DNA QN PCR: ABNORMAL
EXT BUN: ABNORMAL
EXT C PEPTIDE: ABNORMAL
EXT CA 125: ABNORMAL
EXT CA 19-9: ABNORMAL
EXT CA 27-29: ABNORMAL
EXT CALCITONIN: ABNORMAL
EXT CALCIUM: ABNORMAL
EXT CEA: ABNORMAL
EXT CHLORIDE: ABNORMAL
EXT CHOLESTEROL: ABNORMAL
EXT CHROMOGRANIN A: ABNORMAL
EXT CO2: ABNORMAL
EXT CREATININE UA: ABNORMAL
EXT CREATININE: ABNORMAL
EXT CYCLOSPORONE LEVEL: ABNORMAL
EXT DOPAMINE: ABNORMAL
EXT EBV DNA BY PCR: ABNORMAL
EXT EPINEPHRINE: ABNORMAL
EXT FOLATE: ABNORMAL
EXT FREE T3: ABNORMAL
EXT FREE T4: ABNORMAL
EXT FSH: ABNORMAL
EXT GASTRIN RELEASING PEPTIDE: ABNORMAL
EXT GASTRIN RELEASING PEPTIDE: ABNORMAL
EXT GASTRIN: ABNORMAL
EXT GGT: ABNORMAL
EXT GHRELIN: ABNORMAL
EXT GLUCAGON: ABNORMAL
EXT GLUCOSE: ABNORMAL
EXT GROWTH HORMONE: ABNORMAL
EXT HCV RNA QUANT PCR: ABNORMAL
EXT HDL: ABNORMAL
EXT HEMATOCRIT: ABNORMAL
EXT HEMOGLOBIN A1C: ABNORMAL
EXT HEMOGLOBIN: ABNORMAL
EXT HISTAMINE 24 HR URINE: ABNORMAL
EXT HISTAMINE: ABNORMAL
EXT IGF-1: ABNORMAL
EXT IMMUNKNOW (NON-STIMULATED): ABNORMAL
EXT IMMUNKNOW (STIMULATED): ABNORMAL
EXT INR: ABNORMAL
EXT INSULIN: ABNORMAL
EXT LANREOTIDE LEVEL: ABNORMAL
EXT LDH, TOTAL: ABNORMAL
EXT LDL CHOLESTEROL: ABNORMAL
EXT LIPASE: ABNORMAL
EXT MAGNESIUM: ABNORMAL
EXT METANEPHRINE FREE PLASMA: ABNORMAL
EXT MOTILIN: ABNORMAL
EXT NEUROKININ A CAMB: ABNORMAL
EXT NEUROKININ A ISI: ABNORMAL PG/ML (ref 0–40)
EXT NEUROTENSIN: ABNORMAL
EXT NOREPINEPHRINE: ABNORMAL
EXT NORMETANEPHRINE: ABNORMAL
EXT NSE: ABNORMAL
EXT OCTREOTIDE LEVEL: ABNORMAL
EXT PANCREASTATIN CAMB: ABNORMAL
EXT PANCREASTATIN ISI: 75 PG/ML (ref 10–135)
EXT PANCREATIC POLYPEPTIDE: ABNORMAL
EXT PHOSPHORUS: ABNORMAL
EXT PLATELETS: ABNORMAL
EXT POTASSIUM: ABNORMAL
EXT PROGRAF LEVEL: ABNORMAL
EXT PROLACTIN: ABNORMAL
EXT PROTEIN TOTAL: ABNORMAL
EXT PROTEIN UA: ABNORMAL
EXT PT: ABNORMAL
EXT PTH, INTACT: ABNORMAL
EXT PTT: ABNORMAL
EXT RAPAMUNE LEVEL: ABNORMAL
EXT SEROTONIN: 476 NG/ML (ref 56–244)
EXT SODIUM: ABNORMAL
EXT SOMATOSTATIN: ABNORMAL
EXT SUBSTANCE P: ABNORMAL
EXT TRIGLYCERIDES: ABNORMAL
EXT TRYPTASE: ABNORMAL
EXT TSH: ABNORMAL
EXT URIC ACID: ABNORMAL
EXT URINE AMYLASE U/HR: ABNORMAL
EXT URINE AMYLASE U/L: ABNORMAL
EXT VASOACTIVE INTESTINAL POLYPEPTIDE: ABNORMAL
EXT VITAMIN B12: ABNORMAL
EXT VMA 24 HR URINE: ABNORMAL
EXT WBC: ABNORMAL
NEURON SPECIFIC ENOLASE: ABNORMAL

## 2020-07-07 ENCOUNTER — TELEPHONE (OUTPATIENT)
Dept: NEUROLOGY | Facility: HOSPITAL | Age: 45
End: 2020-07-07

## 2020-07-07 DIAGNOSIS — C7B.8 SECONDARY NEUROENDOCRINE TUMOR OF LIVER: Primary | ICD-10-CM

## 2020-07-07 NOTE — TELEPHONE ENCOUNTER
Mr Aileen there is a lipid panel lab test to be done at [facility] whenever you want to come in an get the blood drawn

## 2020-07-08 ENCOUNTER — TELEPHONE (OUTPATIENT)
Dept: NEUROLOGY | Facility: HOSPITAL | Age: 45
End: 2020-07-08

## 2020-07-08 NOTE — TELEPHONE ENCOUNTER
----- Message from Isabell Hernandez sent at 7/6/2020  3:48 PM CDT -----  Regarding: Insurance- Billing question  Patient would like a call in regards to an ongoing issue he has had with a Bill. Patient states Fatou has been working on this issue with him.  Thank you

## 2020-07-08 NOTE — TELEPHONE ENCOUNTER
Spoke with patient about his billing issues and sent them to the  to help in getting the issue resolved. Patient verbalized understanding.

## 2020-07-09 ENCOUNTER — PATIENT MESSAGE (OUTPATIENT)
Dept: NEUROLOGY | Facility: HOSPITAL | Age: 45
End: 2020-07-09

## 2020-07-10 ENCOUNTER — LAB VISIT (OUTPATIENT)
Dept: LAB | Facility: HOSPITAL | Age: 45
End: 2020-07-10
Attending: SURGERY
Payer: COMMERCIAL

## 2020-07-10 DIAGNOSIS — C7B.8 SECONDARY NEUROENDOCRINE TUMOR OF LIVER: ICD-10-CM

## 2020-07-10 LAB
CHOLEST SERPL-MCNC: 162 MG/DL (ref 120–199)
CHOLEST/HDLC SERPL: 4.8 {RATIO} (ref 2–5)
HDLC SERPL-MCNC: 34 MG/DL (ref 40–75)
HDLC SERPL: 21 % (ref 20–50)
LDLC SERPL CALC-MCNC: 111.6 MG/DL (ref 63–159)
NONHDLC SERPL-MCNC: 128 MG/DL
TRIGL SERPL-MCNC: 82 MG/DL (ref 30–150)

## 2020-07-10 PROCEDURE — 86762 RUBELLA ANTIBODY: CPT

## 2020-07-10 PROCEDURE — 86480 TB TEST CELL IMMUN MEASURE: CPT

## 2020-07-10 PROCEDURE — 86765 RUBEOLA ANTIBODY: CPT

## 2020-07-10 PROCEDURE — 86735 MUMPS ANTIBODY: CPT

## 2020-07-10 PROCEDURE — 36415 COLL VENOUS BLD VENIPUNCTURE: CPT

## 2020-07-10 PROCEDURE — 86787 VARICELLA-ZOSTER ANTIBODY: CPT

## 2020-07-10 PROCEDURE — 86706 HEP B SURFACE ANTIBODY: CPT

## 2020-07-10 PROCEDURE — 80061 LIPID PANEL: CPT

## 2020-07-13 LAB
HBV SURFACE AB SER-ACNC: NEGATIVE M[IU]/ML
RUBV IGG SER-ACNC: <5 IU/ML
RUBV IGG SER-IMP: ABNORMAL

## 2020-07-14 LAB
GAMMA INTERFERON BACKGROUND BLD IA-ACNC: 0.02 IU/ML
M TB IFN-G CD4+ BCKGRND COR BLD-ACNC: 0 IU/ML
MITOGEN IGNF BCKGRD COR BLD-ACNC: 5.85 IU/ML
TB GOLD PLUS: NEGATIVE
TB2 - NIL: 0 IU/ML

## 2020-07-15 LAB
MUMPS IGG INTERPRETATION: POSITIVE
MUMPS IGG SCREEN: 2.16 ISR (ref 0–0.9)
RUBEOLA IGG ANTIBODY: 2.27 ISR (ref 0–0.9)
RUBEOLA INTERPRETATION: POSITIVE
VARICELLA INTERPRETATION: POSITIVE
VARICELLA ZOSTER IGG: 3.5 ISR (ref 0–0.9)

## 2020-07-20 DIAGNOSIS — Z01.84 ANTIBODY RESPONSE EXAMINATION: ICD-10-CM

## 2020-07-20 LAB
5-HIAA, PLASMA (NEUROEND): 7 NG/ML
NEUROKININ A: NORMAL PG/ML
PANCREASTATIN: 75 PG/ML (ref 10–135)
SEROTONIN SER-MCNC: 476 NG/ML (ref 56–244)

## 2020-08-19 DIAGNOSIS — Z01.84 ANTIBODY RESPONSE EXAMINATION: ICD-10-CM

## 2020-09-18 DIAGNOSIS — Z01.84 ANTIBODY RESPONSE EXAMINATION: ICD-10-CM

## 2020-10-01 ENCOUNTER — TELEPHONE (OUTPATIENT)
Dept: NEUROLOGY | Facility: HOSPITAL | Age: 45
End: 2020-10-01

## 2020-10-01 NOTE — TELEPHONE ENCOUNTER
Spoke with patient about a letter that was sent to him in regards to a billing issue that has been going on since 2/2020. The information was sent to [employer] services 7/8/2020 and now again for a follow up. I asked the patient to email me a copy of the bill so I know what it is for and he stated he will do that. I will continue to follow up with him and [facility] billing to get this matter resolved.

## 2020-10-01 NOTE — PLAN OF CARE
Chief Complaint:   Chief Complaint   Patient presents with    Consultation     new pt.  stroke         HPI: Patient came to the office with his sister, Monie Merida, for the evaluation of history of stroke with right facial weakness, suspected for potential Bell's palsy, was recently hospitalized, did undergo MRI of the brain, revealed stroke in the occipital lobe, carotid ultrasound revealed bilateral carotid artery stenosis, was seen by neurology service, was recommended medical therapy, referred by patient's nursing home physician Dr. Kelly Wright, regarding further treatment options, currently on aspirin and Plavix    Patient has been at the facility, for the last 4 days, unable to take care of himself, patient is helped by his sisters including Monie Merida that came with him    Patient also has some developmental problems, when he was born, the according the patient's history, the skull froze and at one time was recommended what appears to be possible craniectomy, deferred, patient is bulging of the frontal area right more than left and has been that way for many years    Patient smokes tobacco      Patient denies any new focal lateralizing neurological symptoms like loss of speech, vision or loss of function of extremity    Patient can walk short distances slowly, and denies any symptoms of rest pain    No Known Allergies    Current Outpatient Medications   Medication Sig Dispense Refill    clopidogrel (PLAVIX) 75 MG tablet Take 1 tablet by mouth daily 30 tablet 2    polyvinyl alcohol (LIQUIFILM TEARS) 1.4 % ophthalmic solution Place 1 drop into the left eye as needed      citalopram (CELEXA) 10 MG tablet Take 10 mg by mouth daily      famotidine (PEPCID) 20 MG tablet Take 20 mg by mouth 2 times daily      insulin glargine (LANTUS) 100 UNIT/ML injection vial Inject 43 Units into the skin nightly      lisinopril (PRINIVIL;ZESTRIL) 10 MG tablet Take 10 mg by mouth daily      metFORMIN (GLUCOPHAGE) 1000 MG tablet Take 1,000 VN reviewed discharge instructions with pt.  AVS printed and handed to pt by bedside nurse.  Reviewed followup appts, medication, diet, and importance of medication compliance.  Reviewed home care instructions, treatment plan, self management and when to seek medical attention.  Allowed time for questions, all questions answered.  Pt verbalized complete understanding of discharge instructions and voices no concerns.      Discharge instructions complete.  Bedside delivery done.  Transport wheelchair requested.  Bedside nurse notified.     mg by mouth 2 times daily (with meals)      levothyroxine (SYNTHROID) 175 MCG tablet Take 150 mcg by mouth Daily       Insulin Aspart Prot & Aspart (NOVOLOG MIX 70/30 SC) Inject 10 Units into the skin every morning 5 units before dinner      sertraline (ZOLOFT) 50 MG tablet Take 75 mg by mouth daily Instructed to take am of procedure      vitamin D (ERGOCALCIFEROL) 03686 UNITS CAPS capsule Take 2,000 Units by mouth daily       aspirin (ECOTRIN) 325 MG EC tablet Take 325 mg by mouth daily. Do not crush      oxyCODONE-acetaminophen (PERCOCET) 5-325 MG per tablet   Take 1 tablet by mouth every 4 hours as needed for Pain Instructed to take am of procedure if needed      acetaminophen 650 MG TABS Take 650 mg by mouth every 4 hours as needed. 120 tablet 3    isosorbide mononitrate (IMDUR) 60 MG CR tablet Take 1 tablet by mouth daily. 30 tablet 3    glucagon, rDNA, 1 MG SOLR injection Inject 1 mg into the muscle as needed for Low blood sugar (Blood glucose less than 70 mg/dL and patient NOT ALERT or NPO and does not have IV access. ).  0    glucose (GLUTOSE) 40 % GEL Take 15 g by mouth as needed. 45 g 1    magnesium hydroxide (MILK OF MAGNESIA) 400 MG/5ML suspension Take 30 mLs by mouth daily as needed for Constipation.  atorvastatin (LIPITOR) 20 MG tablet Take 20 mg by mouth daily.  carvedilol (COREG) 6.25 MG tablet Take 6.25 mg by mouth 2 times daily (with meals). Instructed to take morning of surgery with a sip of water      multivitamin (OCUVITE) TABS per tablet Take 1 tablet by mouth daily.  montelukast (SINGULAIR) 10 MG tablet Take 1 tablet by mouth nightly. 30 tablet 0    pantoprazole (PROTONIX) 40 MG tablet Take 1 tablet by mouth every morning (before breakfast). 30 tablet 1    clopidogrel (PLAVIX) 75 MG tablet Take 1 tablet by mouth daily. 30 tablet 11     No current facility-administered medications for this visit.         Past Medical History:   Diagnosis Date    Arthritis     Asthma Gets together: Not on file     Attends Yazidism service: Not on file     Active member of club or organization: Not on file     Attends meetings of clubs or organizations: Not on file     Relationship status: Not on file    Intimate partner violence     Fear of current or ex partner: Not on file     Emotionally abused: Not on file     Physically abused: Not on file     Forced sexual activity: Not on file   Other Topics Concern    Not on file   Social History Narrative    Not on file       Review of Systems:  Skin:  No abnormal pigmentation or rash  Eyes:  No blurring, diplopia or vision loss  Ears/Nose/Throat:  No hearing loss or vertigo  Respiratory:  No cough, pleuritic chest pain, dyspnea, or wheezing. Obstructive sleep apnea, history of tobacco use  Cardiovascular: No angina, palpitations . Coronary artery disease, hyperlipidemia  Gastrointestinal:  No nausea or vomiting; no abdominal pain or rectal bleeding  Musculoskeletal:  No arthritis or weakness. Neurologic:  No paralysis, paresis, paresthesia, seizures or headaches. History of right facial weakness, possible Bell's palsy diagnosed in September seen by neurology service, also has left occipital lobe infarct on MRI of the brain  Hematologic/Lymphatic/Immunologic:  No anemia, abnormal bleeding/bruising, fever, chills or night sweats. Endocrine:  No heat or cold intolerance. No polyphagia, polydipsia or polyuria. Diabetes mellitus      Physical Exam:  General appearance:  Alert, awake, oriented x 3. No distress. Skin:  Warm and dry  Head:  Normocephalic. No masses, lesions or tenderness  Eyes:  Conjunctivae appear normal; PERRL  Ears:  External ears normal  Nose/Sinuses:  Septum midline, mucosa normal; no drainage  Oropharynx:  Clear, no exudate noted  Neck:  No jugular venous distention, lymphadenopathy or thyromegaly. Faint left carotid bruit noted  Lungs:  Clear to ausculation bilaterally.   No rhonchi, crackles, wheezes  Heart:  Regular rate and rhythm. No rub or murmur  Abdomen:  Soft, non-tender. No masses, organomegaly. Musculoskeletal : No joint effusions, tenderness swelling    Neuro: Speech is intact. Moving all extremities. No focal motor or sensory deficits. Mild right facial weakness noted      Extremities:  Both feet are warm to touch. The color of both feet is normal.        Pulses Right  Left    Brachial 3 3    Radial    3=normal   Femoral 2 2  2=diminished   Popliteal    1=barely palpable   Dorsalis pedis    0=absent   Posterior tibial    4=aneurysmal             Other pertinent information:1. The past medical records were reviewed. 2.  MRI of the brain was personally reviewed by me, revealed evidence of left occipital lobe infarct, also similar findings noted on the CT scan of the brain    3. Carotid ultrasound that was done was personally reviewed by me, technically somewhat suboptimal study, overall after reviewing the peak internal carotid velocities and the diastolic velocities in the plaque, patient appears to have approximately 60% stenosis    Assessment:    1. Bilateral carotid artery stenosis    2. Right facial weakness  3.   Left occipital lobe infarct          Plan:       I long detailed discussion patient and his sister, Tasha Diego that takes care of him, telephone #[phone], all options, risks benefits and alternatives were explained    It is felt considering his overall situation, it is prudent and reasonable to follow him conservatively with instruction to continue aspirin Plavix and call me immediately if any further lateralizing neurological symptoms happen, explained, the time a CT of the carotids can be performed and make additional recommendations    Patient also recommended follow-up evaluation see me back in 6 months time    Patient also recommended to stop smoking completely      Patient was instructed to continue walking program and to call if any worsening of symptoms and to call if any focal lateralizing neurological symptoms like loss of speech, vision or loss of function of extremity. All the questions were answered. Indicated follow-up: Return in about 6 months (around 4/1/2021), or if symptoms worsen or fail to improve.           CC to , and [facility]

## 2020-10-01 NOTE — TELEPHONE ENCOUNTER
----- Message from Meredith Santoro sent at 10/1/2020 10:42 AM CDT -----  Type:  Needs Medical Advice    Who Called: patient  Would the patient rather a call back or a response via [facility]? call  Best Call Back Number: [phone]  Additional Information: He has been speaking with Fatou since January in regarding a Billing issue; he is working with someone new but could not remember her name.  He is hoping to come to some type of resolution.

## 2020-10-02 ENCOUNTER — TELEPHONE (OUTPATIENT)
Dept: NEUROLOGY | Facility: HOSPITAL | Age: 45
End: 2020-10-02

## 2020-10-02 NOTE — TELEPHONE ENCOUNTER
Good Afternoon Mr. Gallagher,   I received you bill however, I am unable to submit a handwritten bill. I will need to bill from the company. Also, I did speak with the  and she sees the authorization on file so she is working with the investigation team to see why this is outstanding. As soon a as I get an update I will let you know.   Thank you,   Pat

## 2020-10-18 DIAGNOSIS — Z01.84 ANTIBODY RESPONSE EXAMINATION: ICD-10-CM

## 2020-11-04 NOTE — PROGRESS NOTES
"NOLANETS:  [place] [facility]  A collaboration between [facility] and [facility]      PATIENT: Aubrey Gallagher  MRN: [id number]  DATE: 11/16/2020    Subjective:      Chief Complaint: 6 month follow up with labs, ct, mri    He is doing well overall no complaints at all.    Vitals: Blood pressure 129/83, pulse 65, temperature 98.3 °F (36.8 °C), temperature source Oral, height 5' 9" (1.753 m), weight 115.8 kg (255 lb 2.9 oz). --    ECOG Score: 0 - Asymptomatic    Diagnosis:   1. Malignant carcinoid tumor of ileum         Interval History:  3/17/2019  This is a 43-year-old gentleman who went to the emergency room with excruciating abdominal pain and workup showed mesenteric mass.  [facility] and was [facility] here he has a gallium scan that is consistent with mesenteric mass with a positive somatostatin receptor uptake.  He has ileal primary with mesenteric mass.  He requires carcinoid labs, echocardiogram and an explored laparotomy with small-bowel resection, mesenteric lymphadenectomy, cholecystectomy and exploration of the liver.     I spent a lot of time talking to him and his wife about his condition the natural history of the carcinoid disease.  The difference between the carcinoid and other cancers.   I also talked to them about the treatment options.   IA outlined about the surgery the recovery time the risks and benefits of the surgery such as bleeding infection DVT PE MI redo surgery for leaks and bleeding.  I also informed them the the purpose of surgery is to debulk and not to be curative and there is a chance of recurrence and usually happens in about 6-10 years time.  I emphasized the importance of for close follow-up with scans as this tumor needs to be followed up. At the time of exploration in addition to the primary and the javier Mets I have also a explore the other aspects " of the belly and liver and make sure there is no other disease involved.   would be the basis for him requiring long-acting octreotide therapy.     I answered all the questions a showed them the picture elaborated the surgery and the recovery time and long-term plans.     I also talked about other studies where we preserve our tumor specimen RNA later solution and also talked to him about other stool studies that he apparently doing.  7/16/2019  1. Ex lap  2. sentinl lymph node mapping  3. Appendectomy  4. Cholecystectomy  5. US liver  6. Small bowel resection  7. Mesenteric lymphadenectomy  8. Small bowel anastomosis with ICG perfusion and infrared vision   9.intrlesional chemo with 5 FU  10. Unlisted abdomen  11/11/2019   Small bowel carcinoid with mesenteric lymphadenopathy status post resection more than 3 months back.  He is doing well overall.  Had a follow-up scans done which showed a small nodule at the mesentery most likely related to the postop change.     Clinically doing well physically active  5/11/2020  Neuroendocrine tumor of the small bowel with large mesenteric mass in July of last year.  Doing well with no other complaints.  He is eating well and does not have any eeg I issues other than sporadic diarrhea is active.     He had a recent gallium scan which shows no uptake.  The MRI also shows the no evidence of disease.  Previously in November there was an area in the mesentery.  That was inflammatory and that has resolved completely     Biochemically he had a serum markers done in February which shows a increase in the pancreas statin and serotonin levels.  These labs were sent to [facility].  Previously he had lab work done in the middle of last year.  There is discrepancy in the lab values which needs to be followed up     He is currently not taking any medications      Oncologic History:   Oncologic History Unknown primary 7/2019 Malignant carcinoid tumor of ileum.   Oncologic Treatment 07/2019  lympnadenectomy   Pathology 1. APPENDIX WITH NO EVIDENCE OF MALIGNANCY OR ACUTE INFLAMMATION IDENTIFIED  2. SECTIONS OF GALLBLADDER SHOWING CHRONIC CHOLECYSTITIS, NO EVIDENCE OF MALIGNANCY  IDENTIFIED.  3. SEGMENT OF SMALL BOWEL WITH MULTIPLE WELL-DIFFERENTIATED NEUROENDOCRINE TUMORS,  SEE SYNOPTIC REPORT:  PROCEDURE: SEGMENTAL RESECTION, SMALL INTESTINE  TUMOR SITE: SMALL INTESTINE, NOT OTHERWISE SPECIFIED  TUMOR SIZE: 1.5 CM (LARGEST)  TUMOR FOCALITY: MULTIFOCAL (11)  HISTOLOGIC TYPE: GRADE 1: WELL-DIFFERENTIATED NEUROENDOCRINE TUMOR  MITOTIC RATE: LESS THAN 2 MITOSES PER 2 MM SQ  KI-67 LABELING INDEX: LESS THAN 1% OF TUMOR CELLS STAIN POSITIVELY  TUMOR EXTENSION: TUMOR IS PRIMARILY SUBMUCOSAL BUT DOES FOCALLY INVADE SUPERFICIAL  MUSCULARIS PROPRIA IN AREAS  MARGINS: TUMOR IS PRESENT IN SECTIONS FROM THE NARROW MARGIN, SLIDE 3A (NOT OTHERWISE  DESIGNATED). THE WIDER MARGIN AND MESENTERIC MARGINS ARE NEGATIVE FOR  NEUROENDOCRINE TUMOR  LYMPH-VASCULAR INVASION: PRESENT  LARGE MESENTERIC MASSES: PRESENT, 2 (5 AND 2 CM)  REGIONAL LYMPH NODES: 9 OUT OF 17 (9/17) LYMPH NODES IDENTIFIED WITH THIS SPECIMEN SHOW  METASTATIC NEUROENDOCRINE TUMOR     Past Medical History:  Past Medical History:   Diagnosis Date    Mesenteric mass        Past Surgical History:  Past Surgical History:   Procedure Laterality Date    APPENDECTOMY  7/16/2019    Procedure: APPENDECTOMY;  Surgeon: Kavitha Carlos MD;  Location: [facility] OR;  Service: General;;    CHOLECYSTECTOMY  7/16/2019    Procedure: CHOLECYSTECTOMY;  Surgeon: Kavitha Carlos MD;  Location: [facility] OR;  Service: General;;    COLONOSCOPY         Family History:  History reviewed. No pertinent family history.    Allergies:  Epinephrine and Ciprofloxacin    Medications:   Current Outpatient Medications   Medication Sig    bisacodyl (DULCOLAX) 5 mg EC tablet Take by mouth 2 TABLETS AT 12 NOON THE DAY PRIOR TO SURGERY (Patient not taking: Reported on 11/11/2019)     dicyclomine (BENTYL) 10 MG capsule Take 10 mg by mouth 4 (four) times daily before meals and nightly.    metoclopramide HCl (REGLAN) 10 MG tablet Take 10 mg by mouth 4 (four) times daily.    ondansetron (ZOFRAN-ODT) 4 MG TbDL Take 8 mg by mouth every 12 (twelve) hours.    polyethylene glycol (GLYCOLAX) 17 gram/dose powder Mix 1 capful (17 g) with fluids and drink by mouth once daily. (Patient not taking: Reported on 11/11/2019)    traMADol (ULTRAM) 50 mg tablet Take 1 tablet (50 mg total) by mouth every 6 (six) hours as needed for Pain. (Patient not taking: Reported on 11/11/2019)    traMADol (ULTRAM) 50 mg tablet Take 1 tablet (50 mg total) by mouth every 6 (six) hours as needed for pain. (Patient not taking: Reported on 11/11/2019)    traMADol (ULTRAM) 50 mg tablet Utd     No current facility-administered medications for this visit.         Review of Systems   Constitutional: Negative for appetite change, chills, diaphoresis, fatigue and unexpected weight change.   HENT: Negative for dental problem, hearing loss, postnasal drip, rhinorrhea, sinus pressure and sore throat.    Eyes: Negative for pain, discharge, redness, itching and visual disturbance.   Respiratory: Negative for cough, choking, chest tightness, shortness of breath, wheezing and stridor.    Cardiovascular: Negative for chest pain, palpitations and leg swelling.   Gastrointestinal: Negative for anal bleeding and blood in stool.   Endocrine: Negative.    Genitourinary: Negative.    Musculoskeletal: Negative for back pain and joint swelling.   Allergic/Immunologic: Negative.    Neurological: Negative for dizziness, tremors, syncope, facial asymmetry, weakness and headaches.   Hematological: Negative.    Psychiatric/Behavioral: Negative.       Objective:      Physical Exam  Constitutional:       Appearance: Normal appearance.   HENT:      Head: Normocephalic.      Right Ear: External ear normal.      Left Ear: External ear normal.      Nose: Nose  normal.      Mouth/Throat:      Mouth: Mucous membranes are moist.   Neck:      Musculoskeletal: No neck rigidity or muscular tenderness.      Vascular: No carotid bruit.   Cardiovascular:      Rate and Rhythm: Regular rhythm. Tachycardia present.      Pulses: Normal pulses.      Heart sounds: Normal heart sounds.   Pulmonary:      Effort: Pulmonary effort is normal.      Breath sounds: Normal breath sounds.   Abdominal:      General: Abdomen is flat. Bowel sounds are normal. There is no distension.      Tenderness: There is no abdominal tenderness. There is no right CVA tenderness, left CVA tenderness or guarding.      Hernia: No hernia is present.      Comments: Midline incision is healed well may have a small hernia not tender no symptoms at all   Musculoskeletal: Normal range of motion.   Lymphadenopathy:      Cervical: No cervical adenopathy.   Skin:     General: Skin is warm.   Neurological:      General: No focal deficit present.      Mental Status: He is alert and oriented to person, place, and time.   Psychiatric:         Mood and Affect: Mood normal.        Assessment:       1. Malignant carcinoid tumor of ileum        Laboratory Data:       Scans:   MRI Abdomen-Pelvis w w/o Contrast (XPD)  Narrative: EXAMINATION:  MRI ABDOMEN-PELVIS W W/O CONTRAST (XPD)    CLINICAL HISTORY:  Neoplasm: abdomen, other primary, suspected;  Neoplasm of unspecified behavior of other specified sites    TECHNIQUE:  Multiplanar, multisequence MRI of the abdomen and pelvis was performed before and after the administration of 10 cc gadolinium contrast.    COMPARISON:  Prior dated 11/07/2019 and prior MRI of the abdomen dated 05/06/2020 and prior CT dated 11/07/2019 as well as prior PET dated 11/11/2020.    FINDINGS:  The liver is normal in size, contour, and background signal.  Two subcentimeter lesions with delayed postcontrast enhancement are present in segment 6 unchanged from previous exam.  The larger measures approximately 7  mm (1901:63).  These are nonspecific and could reflect hemangiomas or other benign lesions with stable metastatic disease not excluded.  No new lesions are seen.  There is a 6 mm cyst on image 12:16.The gallbladder is absent. The portal vein is patent.    The pancreas, spleen, and adrenal glands have a normal appearance.  Kidneys demonstrate normal cortical thickness.  There is postsurgical change of the anterior abdominal wall.  Postsurgical changes also noted at the root of the mesentery and a tiny stable soft tissue nodule measuring 6 mm at the postoperative region is unchanged from prior exams (1900:83).  This could reflect a benign reactive lymph node or less likely residual disease.    No definite adenopathy or free fluid is seen in the abdomen or pelvis.  Impression: 1. No detrimental change from prior exam.  2. Two subcentimeter enhancing lesions in the right hepatic lobe are stable from previous exams and could reflect atypical hemangiomas with stable metastatic disease not excluded.  No new lesions are detected.  3. 6 mm soft tissue nodule at mesenteric root adjacent to resection site is similar to previous CT and most likely reflects a benign reactive mesenteric node with stable residual disease also possible.    Electronically signed by: Chioma Garcia MD  Date:    11/12/2020  Time:    11:56       Impression:  45-year-old gentleman who underwent surgery for small-bowel neuroendocrine tumor of mid ileum. with small-bowel resection lymphadenectomy.  He has been followed-up 6 monthly     Overall no complaints of abdominal pain     His recent MRI and gallium scan shows no evidence of disease.  There is a mesenteric mass which is stable appears inflammatory which has no gallium uptake his last neuroendocrine markers were normal range    The CT scan was reviewed he has some incisional hernia but he is not symptomatic at all was were no complaints of pain discomfort or bulge    Plan:       Follow-up with MRI  in 6 months time   neuroendocrine markers today    Follow-up later in case he develops any symptoms from his abdominal incisional hernia        MILENA Plummer MD, FACS   Associate Professor of Surgery, [facility]   Neuroendocrine Surgery, Hepatic/Pancreatic & General Surgery   [address], [address]   [place] [address]   ph. [phone]; [phone]   fax. [phone]

## 2020-11-11 ENCOUNTER — HOSPITAL ENCOUNTER (OUTPATIENT)
Dept: RADIOLOGY | Facility: HOSPITAL | Age: 45
Discharge: HOME | End: 2020-11-11
Attending: SURGERY
Payer: OTHER GOVERNMENT

## 2020-11-11 DIAGNOSIS — C7A.012 MALIGNANT CARCINOID TUMOR OF ILEUM: ICD-10-CM

## 2020-11-11 DIAGNOSIS — D49.89 NEOPLASM OF ABDOMEN: ICD-10-CM

## 2020-11-11 PROCEDURE — 25500020 PHARM REV CODE 255: Performed by: SURGERY

## 2020-11-11 PROCEDURE — 74183 MRI ABDOMEN-PELVIS W W/O CONTRAST (XPD): ICD-10-PCS | Mod: 26,,, | Performed by: RADIOLOGY

## 2020-11-11 PROCEDURE — A9585 GADOBUTROL INJECTION: HCPCS | Performed by: SURGERY

## 2020-11-11 PROCEDURE — 72197 MRI PELVIS W/O & W/DYE: CPT | Mod: TC

## 2020-11-11 PROCEDURE — 78815 NM PET 68GA DOTATATE WHOLE BODY: ICD-10-PCS | Mod: 26,PS,, | Performed by: RADIOLOGY

## 2020-11-11 PROCEDURE — 74183 MRI ABD W/O CNTR FLWD CNTR: CPT | Mod: 26,,, | Performed by: RADIOLOGY

## 2020-11-11 PROCEDURE — 78815 PET IMAGE W/CT SKULL-THIGH: CPT | Mod: TC

## 2020-11-11 PROCEDURE — 78815 PET IMAGE W/CT SKULL-THIGH: CPT | Mod: 26,PS,, | Performed by: RADIOLOGY

## 2020-11-11 PROCEDURE — 72197 MRI ABDOMEN-PELVIS W W/O CONTRAST (XPD): ICD-10-PCS | Mod: 26,,, | Performed by: RADIOLOGY

## 2020-11-11 PROCEDURE — 72197 MRI PELVIS W/O & W/DYE: CPT | Mod: 26,,, | Performed by: RADIOLOGY

## 2020-11-11 PROCEDURE — A9587 GALLIUM GA-68: HCPCS

## 2020-11-11 RX ORDER — GADOBUTROL 604.72 MG/ML
10 INJECTION INTRAVENOUS
Status: COMPLETED | OUTPATIENT
Start: 2020-11-11 | End: 2020-11-11

## 2020-11-11 RX ADMIN — GADOBUTROL 10 ML: 604.72 INJECTION INTRAVENOUS at 05:11

## 2020-11-16 ENCOUNTER — OFFICE VISIT (OUTPATIENT)
Dept: NEUROLOGY | Facility: HOSPITAL | Age: 45
End: 2020-11-16
Attending: SURGERY
Payer: OTHER GOVERNMENT

## 2020-11-16 VITALS
WEIGHT: 255.19 LBS | BODY MASS INDEX: 37.8 KG/M2 | HEART RATE: 65 BPM | SYSTOLIC BLOOD PRESSURE: 129 MMHG | TEMPERATURE: 98 F | DIASTOLIC BLOOD PRESSURE: 83 MMHG | HEIGHT: 69 IN

## 2020-11-16 DIAGNOSIS — C7A.012 MALIGNANT CARCINOID TUMOR OF ILEUM: Primary | ICD-10-CM

## 2020-11-16 PROCEDURE — 99215 OFFICE O/P EST HI 40 MIN: CPT | Performed by: SURGERY

## 2020-11-16 NOTE — PATIENT INSTRUCTIONS
Your lab work due today 1st floor outpatient diagnostic       Your MRI and Follow up Visit is 6 months and you will get a letter and a reminder to call and schedule

## 2020-11-17 DIAGNOSIS — Z01.84 ANTIBODY RESPONSE EXAMINATION: ICD-10-CM

## 2021-03-16 ENCOUNTER — TELEPHONE (OUTPATIENT)
Dept: NEUROLOGY | Facility: HOSPITAL | Age: 46
End: 2021-03-16

## 2021-04-16 ENCOUNTER — PATIENT MESSAGE (OUTPATIENT)
Dept: RESEARCH | Facility: HOSPITAL | Age: 46
End: 2021-04-16

## 2021-08-12 ENCOUNTER — LAB VISIT (OUTPATIENT)
Dept: LAB | Facility: HOSPITAL | Age: 46
End: 2021-08-12
Attending: SURGERY
Payer: OTHER GOVERNMENT

## 2021-08-12 DIAGNOSIS — C7A.012 MALIGNANT CARCINOID TUMOR OF THE ILEUM: Primary | ICD-10-CM

## 2021-08-12 PROCEDURE — 36415 COLL VENOUS BLD VENIPUNCTURE: CPT | Performed by: SURGERY

## 2021-08-12 PROCEDURE — 86316 IMMUNOASSAY TUMOR OTHER: CPT | Performed by: SURGERY

## 2021-08-12 PROCEDURE — 83519 RIA NONANTIBODY: CPT | Performed by: SURGERY

## 2021-08-12 PROCEDURE — 82542 COL CHROMOTOGRAPHY QUAL/QUAN: CPT | Performed by: SURGERY

## 2021-08-12 PROCEDURE — 84260 ASSAY OF SEROTONIN: CPT | Performed by: SURGERY

## 2021-08-18 LAB — SEROTONIN: 587 NG/ML

## 2021-08-24 LAB
EXT 24 HR UR METANEPHRINE: ABNORMAL
EXT 24 HR UR NORMETANEPHRINE: ABNORMAL
EXT 24 HR UR NORMETANEPHRINE: ABNORMAL
EXT 25 HYDROXY VIT D2: ABNORMAL
EXT 25 HYDROXY VIT D3: ABNORMAL
EXT 5 HIAA 24 HR URINE: ABNORMAL
EXT 5 HIAA BLOOD: ABNORMAL
EXT ACTH: ABNORMAL
EXT AFP: ABNORMAL
EXT ALBUMIN: ABNORMAL
EXT ALKALINE PHOSPHATASE: ABNORMAL
EXT ALT: 53 IU/L (ref 12–63)
EXT AMYLASE: ABNORMAL
EXT ANTI ISLET CELL AB: ABNORMAL
EXT ANTI PARIETAL CELL AB: ABNORMAL
EXT ANTI THYROID AB: ABNORMAL
EXT AST: 37 IU/L (ref 15–41)
EXT BILIRUBIN DIRECT: ABNORMAL
EXT BILIRUBIN TOTAL: ABNORMAL
EXT BK VIRUS DNA QN PCR: ABNORMAL
EXT BUN: ABNORMAL
EXT C PEPTIDE: ABNORMAL
EXT CA 125: ABNORMAL
EXT CA 19-9: ABNORMAL
EXT CA 27-29: ABNORMAL
EXT CALCITONIN: ABNORMAL
EXT CALCIUM: ABNORMAL
EXT CEA: ABNORMAL
EXT CHLORIDE: ABNORMAL
EXT CHOLESTEROL: 143 MG/DL (ref 0–240)
EXT CHROMOGRANIN A: ABNORMAL
EXT CO2: ABNORMAL
EXT CREATININE UA: ABNORMAL
EXT CREATININE: ABNORMAL
EXT CYCLOSPORONE LEVEL: ABNORMAL
EXT DOPAMINE: ABNORMAL
EXT EBV DNA BY PCR: ABNORMAL
EXT EPINEPHRINE: ABNORMAL
EXT FOLATE: ABNORMAL
EXT FREE T3: ABNORMAL
EXT FREE T4: ABNORMAL
EXT FSH: ABNORMAL
EXT GASTRIN RELEASING PEPTIDE: ABNORMAL
EXT GASTRIN RELEASING PEPTIDE: ABNORMAL
EXT GASTRIN: ABNORMAL
EXT GGT: ABNORMAL
EXT GHRELIN: ABNORMAL
EXT GLUCAGON: ABNORMAL
EXT GLUCOSE: ABNORMAL
EXT GROWTH HORMONE: ABNORMAL
EXT HCV RNA QUANT PCR: ABNORMAL
EXT HDL: 30.5 MG/DL (ref 0–40)
EXT HEMATOCRIT: ABNORMAL
EXT HEMOGLOBIN A1C: ABNORMAL
EXT HEMOGLOBIN: ABNORMAL
EXT HISTAMINE 24 HR URINE: ABNORMAL
EXT HISTAMINE: ABNORMAL
EXT IGF-1: ABNORMAL
EXT IMMUNKNOW (NON-STIMULATED): ABNORMAL
EXT IMMUNKNOW (STIMULATED): ABNORMAL
EXT INR: ABNORMAL
EXT INSULIN: ABNORMAL
EXT LANREOTIDE LEVEL: ABNORMAL
EXT LDH, TOTAL: ABNORMAL
EXT LDL CHOLESTEROL: 75.5 MG/DL (ref 0–100)
EXT LIPASE: ABNORMAL
EXT MAGNESIUM: ABNORMAL
EXT METANEPHRINE FREE PLASMA: ABNORMAL
EXT MOTILIN: ABNORMAL
EXT NEUROKININ A CAMB: ABNORMAL
EXT NEUROKININ A ISI: ABNORMAL
EXT NEUROTENSIN: ABNORMAL
EXT NOREPINEPHRINE: ABNORMAL
EXT NORMETANEPHRINE: ABNORMAL
EXT NSE: ABNORMAL
EXT OCTREOTIDE LEVEL: ABNORMAL
EXT PANCREASTATIN CAMB: ABNORMAL
EXT PANCREASTATIN ISI: ABNORMAL
EXT PANCREATIC POLYPEPTIDE: ABNORMAL
EXT PHOSPHORUS: ABNORMAL
EXT PLATELETS: ABNORMAL
EXT POTASSIUM: ABNORMAL
EXT PROGRAF LEVEL: ABNORMAL
EXT PROLACTIN: ABNORMAL
EXT PROTEIN TOTAL: ABNORMAL
EXT PROTEIN UA: ABNORMAL
EXT PT: ABNORMAL
EXT PTH, INTACT: ABNORMAL
EXT PTT: ABNORMAL
EXT RAPAMUNE LEVEL: ABNORMAL
EXT SEROTONIN: ABNORMAL
EXT SODIUM: ABNORMAL
EXT SOMATOSTATIN: ABNORMAL
EXT SUBSTANCE P: ABNORMAL
EXT TRIGLYCERIDES: 185 MG/DL (ref 0–200)
EXT TRYPTASE: ABNORMAL
EXT TSH: ABNORMAL
EXT URIC ACID: ABNORMAL
EXT URINE AMYLASE U/HR: ABNORMAL
EXT URINE AMYLASE U/L: ABNORMAL
EXT VASOACTIVE INTESTINAL POLYPEPTIDE: ABNORMAL
EXT VITAMIN B12: ABNORMAL
EXT VMA 24 HR URINE: ABNORMAL
EXT WBC: ABNORMAL
LDL CHOLESTEROL DIRECT: 83 MG/DL
NEURON SPECIFIC ENOLASE: ABNORMAL

## 2021-08-27 LAB
5OH-INDOLEACETATE SERPL-MCNC: 11 NG/ML
PANCREASTATIN SERPL-MCNC: 73 PG/ML (ref 10–135)

## 2021-09-13 LAB — NEUROKININ A SERPL-MCNC: 13 PG/ML (ref 0–40)

## 2021-10-04 ENCOUNTER — TELEPHONE (OUTPATIENT)
Dept: NEUROLOGY | Facility: HOSPITAL | Age: 46
End: 2021-10-04

## 2021-10-13 ENCOUNTER — TELEPHONE (OUTPATIENT)
Dept: NEUROLOGY | Facility: HOSPITAL | Age: 46
End: 2021-10-13

## 2021-10-13 DIAGNOSIS — K80.10 CALCULUS OF GALLBLADDER WITH CHRONIC CHOLECYSTITIS WITHOUT OBSTRUCTION: ICD-10-CM

## 2021-10-13 DIAGNOSIS — C7A.012 MALIGNANT CARCINOID TUMOR OF ILEUM: Primary | ICD-10-CM

## 2021-10-13 DIAGNOSIS — C7A.00 CARCINOID TUMOR METASTATIC TO INTRA-ABDOMINAL LYMPH NODE: ICD-10-CM

## 2021-10-13 DIAGNOSIS — C7B.09 CARCINOID TUMOR METASTATIC TO INTRA-ABDOMINAL LYMPH NODE: ICD-10-CM

## 2021-10-18 ENCOUNTER — TELEPHONE (OUTPATIENT)
Dept: NEUROLOGY | Facility: HOSPITAL | Age: 46
End: 2021-10-18

## 2021-11-11 ENCOUNTER — HOSPITAL ENCOUNTER (OUTPATIENT)
Dept: RADIOLOGY | Facility: HOSPITAL | Age: 46
Discharge: HOME | End: 2021-11-11
Attending: SURGERY
Payer: OTHER GOVERNMENT

## 2021-11-11 ENCOUNTER — PATIENT MESSAGE (OUTPATIENT)
Dept: NEUROLOGY | Facility: HOSPITAL | Age: 46
End: 2021-11-11

## 2021-11-11 ENCOUNTER — OFFICE VISIT (OUTPATIENT)
Dept: NEUROLOGY | Facility: HOSPITAL | Age: 46
End: 2021-11-11
Attending: SURGERY
Payer: OTHER GOVERNMENT

## 2021-11-11 VITALS
HEIGHT: 69 IN | DIASTOLIC BLOOD PRESSURE: 88 MMHG | TEMPERATURE: 99 F | SYSTOLIC BLOOD PRESSURE: 150 MMHG | BODY MASS INDEX: 38.4 KG/M2 | HEART RATE: 52 BPM | WEIGHT: 259.25 LBS

## 2021-11-11 DIAGNOSIS — C7B.09 CARCINOID TUMOR METASTATIC TO INTRA-ABDOMINAL LYMPH NODE: ICD-10-CM

## 2021-11-11 DIAGNOSIS — K90.89 BILE SALT-INDUCED DIARRHEA: Primary | ICD-10-CM

## 2021-11-11 DIAGNOSIS — C7A.012 MALIGNANT CARCINOID TUMOR OF ILEUM: ICD-10-CM

## 2021-11-11 DIAGNOSIS — K80.10 CALCULUS OF GALLBLADDER WITH CHRONIC CHOLECYSTITIS WITHOUT OBSTRUCTION: ICD-10-CM

## 2021-11-11 DIAGNOSIS — C7B.8 SECONDARY NEUROENDOCRINE TUMOR OF LIVER: ICD-10-CM

## 2021-11-11 DIAGNOSIS — C7A.00 CARCINOID TUMOR METASTATIC TO INTRA-ABDOMINAL LYMPH NODE: ICD-10-CM

## 2021-11-11 DIAGNOSIS — C7B.01 SECONDARY CARCINOID TUMOR OF DISTANT LYMPH NODES: ICD-10-CM

## 2021-11-11 PROCEDURE — 74183 MRI ABD W/O CNTR FLWD CNTR: CPT | Mod: 26,,, | Performed by: RADIOLOGY

## 2021-11-11 PROCEDURE — A9585 GADOBUTROL INJECTION: HCPCS | Performed by: SURGERY

## 2021-11-11 PROCEDURE — 74183 MRI ABD W/O CNTR FLWD CNTR: CPT | Mod: TC

## 2021-11-11 PROCEDURE — 74183 MRI ABDOMEN W WO CONTRAST: ICD-10-PCS | Mod: 26,,, | Performed by: RADIOLOGY

## 2021-11-11 PROCEDURE — 25500020 PHARM REV CODE 255: Performed by: SURGERY

## 2021-11-11 PROCEDURE — 99215 OFFICE O/P EST HI 40 MIN: CPT | Mod: 25 | Performed by: SURGERY

## 2021-11-11 RX ORDER — CHOLESTYRAMINE 4 G/9G
4 POWDER, FOR SUSPENSION ORAL
Qty: 270 PACKET | Refills: 3 | Status: SHIPPED | OUTPATIENT
Start: 2021-11-11 | End: 2022-08-11 | Stop reason: SDUPTHER

## 2021-11-11 RX ORDER — CHOLESTYRAMINE 4 G/9G
4 POWDER, FOR SUSPENSION ORAL
Qty: 270 PACKET | Refills: 3 | Status: SHIPPED | OUTPATIENT
Start: 2021-11-11 | End: 2022-08-11

## 2021-11-11 RX ORDER — CHOLESTYRAMINE 4 G/9G
4 POWDER, FOR SUSPENSION ORAL
Qty: 270 PACKET | Refills: 3 | OUTPATIENT
Start: 2021-11-11 | End: 2021-11-11 | Stop reason: SDUPTHER

## 2021-11-11 RX ORDER — CHOLESTYRAMINE 4 G/9G
4 POWDER, FOR SUSPENSION ORAL
Qty: 270 PACKET | Refills: 3 | Status: SHIPPED | OUTPATIENT
Start: 2021-11-11 | End: 2021-11-11 | Stop reason: SDUPTHER

## 2021-11-11 RX ORDER — GADOBUTROL 604.72 MG/ML
10 INJECTION INTRAVENOUS
Status: COMPLETED | OUTPATIENT
Start: 2021-11-11 | End: 2021-11-11

## 2021-11-11 RX ORDER — OMEPRAZOLE 20 MG/1
20 CAPSULE, DELAYED RELEASE ORAL DAILY
COMMUNITY
Start: 2021-07-12

## 2021-11-11 RX ADMIN — GADOBUTROL 10 ML: 604.72 INJECTION INTRAVENOUS at 10:11

## 2021-11-13 ENCOUNTER — PATIENT MESSAGE (OUTPATIENT)
Dept: NEUROLOGY | Facility: HOSPITAL | Age: 46
End: 2021-11-13
Payer: OTHER GOVERNMENT

## 2022-02-16 NOTE — PROGRESS NOTES
NOLANETS:  [facility]  A collaboration between [facility] and [facility]      PATIENT: Aubrey Gallagher  MRN: [id number]  DATE: 2/16/2022    Subjective:      Chief Complaint: 3 month follow up    He was seen by me in November and was found have a stable disease however he was having intermittent diarrhea. he was given cholestyramine.  After questran he feels better his diarrhea has improved, intermittent and  responds to Questran    Vitals: There were no vitals taken for this visit. --    ECOG Score: 0 - Asymptomatic    Diagnosis:   No diagnosis found.     Interval History:    3/17/2019  This is a 43-year-old gentleman who went to the emergency room with excruciating abdominal pain and workup showed mesenteric mass.  [facility] here he has a gallium scan that is consistent with mesenteric mass with a positive somatostatin receptor uptake.  He has ileal primary with mesenteric mass.  He requires carcinoid labs, echocardiogram and an explored laparotomy with small-bowel resection, mesenteric lymphadenectomy, cholecystectomy and exploration of the liver.      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  Doing well with no other complaints.  He is eating well and does not have any eeg I issues other than sporadic diarrhea is active.     11/11/21- His main complaint is diarrhea which appears to be from bile salt diarrhea appy he was worried about his serotonin level.  the diarrhea he does not have any carcinoid syndrome and he does not have any detectable disease on the scans.  I talked about his condition a bile salt diarrhea and the treatment I talked about the patchy about taking Questran and slowly weaning and decreasing the dosage over a period of time. His MRI looks stable his markers also looks  stable.  Since he has a bile salt diarrhea to make sure he has responded I will see him back in 3 months time will do the markers in 6 months time.      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    Allergies:  Epinephrine and Ciprofloxacin    Medications:   Current Outpatient Medications   Medication Sig    bisacodyl (DULCOLAX) 5 mg EC tablet Take by mouth 2 TABLETS AT 12 NOON THE DAY PRIOR TO SURGERY (Patient not taking: No sig reported)    cholestyramine (QUESTRAN) 4 gram packet Take 1 packet (4 g total) by mouth 3 (three) times daily with meals.    cholestyramine (QUESTRAN) 4 gram packet Take 1 packet (4 g total) by mouth 3 (three) times daily with meals.    cholestyramine (QUESTRAN) 4 gram packet Take 1 packet (4 g total) by mouth 3 (three) times daily with meals.    dicyclomine (BENTYL) 10 MG capsule Take 10 mg by mouth 4 (four) times daily before meals and nightly.    metoclopramide HCl (REGLAN) 10 MG tablet Take 10 mg by mouth 4 (four) times daily.    NAPROXEN ORAL Take by mouth.    omeprazole (PRILOSEC) 20 MG capsule Take 20 mg by mouth once daily.    ondansetron (ZOFRAN-ODT) 4 MG  TbDL Take 8 mg by mouth every 12 (twelve) hours.    polyethylene glycol (GLYCOLAX) 17 gram/dose powder Mix 1 capful (17 g) with fluids and drink by mouth once daily. (Patient not taking: No sig reported)    traMADol (ULTRAM) 50 mg tablet Take 1 tablet (50 mg total) by mouth every 6 (six) hours as needed for Pain. (Patient not taking: No sig reported)    traMADol (ULTRAM) 50 mg tablet Take 1 tablet (50 mg total) by mouth every 6 (six) hours as needed for pain. (Patient not taking: No sig reported)    traMADol (ULTRAM) 50 mg tablet Utd     No current facility-administered medications for this visit.        Review of Systems   Constitutional: Negative for appetite change, chills, diaphoresis, fatigue and unexpected weight change.   HENT: Negative for dental problem, hearing loss, postnasal drip, rhinorrhea, sinus pressure and sore throat.    Eyes: Negative for pain, discharge, redness, itching and visual disturbance.   Respiratory: Negative for cough, choking, chest tightness, shortness of breath, wheezing and stridor.    Cardiovascular: Negative for chest pain, palpitations and leg swelling.   Gastrointestinal: Positive for diarrhea. Negative for anal bleeding and blood in stool.   Endocrine: Negative.    Genitourinary: Negative.    Musculoskeletal: Negative for back pain and joint swelling.   Skin: Negative for rash and wound.   Allergic/Immunologic: Negative.    Neurological: Negative for dizziness, tremors, syncope, facial asymmetry, weakness and headaches.   Hematological: Negative.    Psychiatric/Behavioral: Negative.       Objective:      Physical Exam  Constitutional:       Appearance: Normal appearance.   HENT:      Head: Normocephalic.      Right Ear: External ear normal.      Left Ear: External ear normal.      Nose: Nose normal.      Mouth/Throat:      Mouth: Mucous membranes are moist.   Neck:      Vascular: No carotid bruit.   Cardiovascular:      Rate and Rhythm: Regular rhythm. Tachycardia present.       Pulses: Normal pulses.      Heart sounds: Normal heart sounds.   Pulmonary:      Effort: Pulmonary effort is normal.      Breath sounds: Normal breath sounds.   Abdominal:      General: Abdomen is flat. Bowel sounds are normal. There is no distension.      Tenderness: There is no abdominal tenderness. There is no right CVA tenderness, left CVA tenderness or guarding.      Hernia: No hernia is present.      Comments: Midline incision is healed well    Musculoskeletal:         General: Normal range of motion.      Cervical back: No rigidity. No muscular tenderness.   Lymphadenopathy:      Cervical: No cervical adenopathy.   Skin:     General: Skin is warm.   Neurological:      General: No focal deficit present.      Mental Status: He is alert and oriented to person, place, and time.   Psychiatric:         Mood and Affect: Mood normal.        Assessment:       No diagnosis found.    Laboratory Data:   Neuroendocrine Labs Latest Ref Rng & Units 8/12/2021 11/16/2020   5 HIAA BLOOD Up to 22 ng/mL 11 7   EXT 5 HIAA BLOOD 0 - 22 ng/mL     SEROTONIN <=230 ng/mL 587 (H) 599 (H)   SEROTONIN 56 - 244 ng/mL     EXT SEROTONIN 56 - 244 ng/mL     EXT CHROMOGRANIN A 25 - 140 ng/ml     PANCREASTATIN 10 - 135 pg/mL     PANCREASTATIN 10 - 135 pg/mL 73 66   EXT PANCREASTATIN JAKI 10 - 135 pg/mL     NEUROKININ A UP TO 40 pg/mL     NEUROKININ A 0 - 40 pg/mL 13 5       Scans:   MRI abd w/wo contrast- 11/11/2021  COMPARISON:  MRI abdomen pelvis with and dated 11/12/2020 and PET-CT dated 11/11/2020     FINDINGS:  Pulmonary Bases: No pleural effusion.     Liver: Liver is normal in size and contour.  Diffuse signal dropout on opposed phase sequence in keeping with steatosis.  Redemonstration of mild T2 hyperintense lesions with delayed enhancement in the right lobe (for reference series 1402, images 36, 63, and 70).  Additional nonenhancing T2 hyperintense focus (series 6, image 19), unchanged.  No definite new lesion.  Intrahepatic  vasculature is patent.     Bile Ducts: No biliary dilatation.     Gallbladder: Absent     Pancreas: normal.     Spleen: normal.     Proximal Gastrointestinal tract: Normal caliber.     Mesentery: No discrete mesenteric mass.     Adrenal Glands: normal.     Kidneys: Enhance symmetrically without hydronephrosis.     Aorta and Abdominal Vasculature: The aorta, IVC SMV, and splenic vein appear patent.     Lymph nodes: Similar shotty periportal nodes.  Central mesenteric 0.5 cm nodular focus, unchanged (series 12, image 91).     Body wall: Postsurgical change of the anterior abdominal wall with lower fat containing midline hernia.     Other: No free fluid.  No suspicious marrow enhancing lesion.     Impression:     Stable exam without significant interval detrimental change.     Stable right intrahepatic lesions, again possible atypical hemangiomas with stable metastatic disease not excluded.  No new lesion.     Hepatic steatosis and additional findings as above.       NM PET 68GA DOTATATE WHOLE BODY -11/11/2020     TECHNIQUE:  4.25 mCi of Ga-68 DOTA-TREVIÑO was administered intravenously in the left antecubital fossa. After an approximately 60 min distribution time, PET/CT images were acquired from the skull vertex to the mid thigh. Transmission images were acquired to correct for attenuation using a whole body low-dose CT scan without contrast with the arms positioned above the head.     COMPARISON:  Gallium 68 DOTA-TREVIÑO PET-CT 05/06/2020.     FINDINGS:  Quality of the study: Adequate.     In the head and neck, there is physiologic distribution in the pituitary, salivary, and thyroid glands, and there are no tracer avid lesions suspicious for malignancy.  There is enlargement of the thyroid gland, similar to several prior exams.     In the chest, there are no tracer avid lesions suspicious for malignancy.     Patient with known small bowel carcinoid status post small bowel resection, there are several loops of bowel in  the mid abdomen associated surgical sutures as well as minimal fat stranding and postoperative changes of the ventral abdominal wall.  There is physiologic uptake in the pancreatic uncinate process and body, liver, spleen, adrenal glands, and  tract.  There is focal uptake surrounding a mid abdominal surgical suture, similar prior exam and likely postoperative in nature.  There are no tracer avid lesions suspicious for malignancy.     Postop changes in the ventral abdominal wall with several midline ventral fat containing hernias.  Largest 1 adjacent to the umbilicus is slightly larger in size with a fascial defect measuring up to 2.4 cm, previously 1.7.     In the bones, there are no tracer avid lesions suspicious for malignancy.     Impression:     No PET/CT findings to suggest somatostatin receptor avid disease.        Impression:    45-year-old gentleman who underwent surgery for small-bowel neuroendocrine tumor of mid ileum. with small-bowel resection lymphadenectomy.     He had MRI done in November which shows stable disease markers are stable.  He is responding well to Questran he feels good overall.    Spent about 20 minutes in the room reviewing his MRI and went over the recommendations.    Will see him again this November with MRI and neuroendocrine tumor labs          Plan:         Follow-up in November with neuroendocrine tumor labs and MRI  Healthy lifestyle recommended  To call for any problem  MILENA Plummer MD, FACS   Associate Professor of Surgery, [facility]   Neuroendocrine Surgery, Hepatic/Pancreatic & General Surgery   [address], [address]   [place] [address]   ph. [phone]; [phone]   fax. [phone]

## 2022-02-17 ENCOUNTER — OFFICE VISIT (OUTPATIENT)
Dept: NEUROLOGY | Facility: HOSPITAL | Age: 47
End: 2022-02-17
Attending: SURGERY
Payer: OTHER GOVERNMENT

## 2022-02-17 VITALS
WEIGHT: 255.06 LBS | HEART RATE: 74 BPM | HEIGHT: 69 IN | TEMPERATURE: 98 F | BODY MASS INDEX: 37.78 KG/M2 | DIASTOLIC BLOOD PRESSURE: 76 MMHG | SYSTOLIC BLOOD PRESSURE: 127 MMHG

## 2022-02-17 DIAGNOSIS — C7B.8 SECONDARY NEUROENDOCRINE TUMOR OF LIVER: ICD-10-CM

## 2022-02-17 DIAGNOSIS — C7A.012 MALIGNANT CARCINOID TUMOR OF ILEUM: Primary | ICD-10-CM

## 2022-02-17 PROCEDURE — 99215 OFFICE O/P EST HI 40 MIN: CPT | Performed by: SURGERY

## 2022-02-22 DIAGNOSIS — D84.9 IMMUNOSUPPRESSED STATUS: ICD-10-CM

## 2022-05-16 ENCOUNTER — LAB VISIT (OUTPATIENT)
Dept: LAB | Facility: HOSPITAL | Age: 47
End: 2022-05-16
Attending: SURGERY
Payer: OTHER GOVERNMENT

## 2022-05-16 DIAGNOSIS — C7B.01 SECONDARY CARCINOID TUMOR OF DISTANT LYMPH NODES: ICD-10-CM

## 2022-05-16 DIAGNOSIS — C7B.09 CARCINOID TUMOR METASTATIC TO INTRA-ABDOMINAL LYMPH NODE: ICD-10-CM

## 2022-05-16 DIAGNOSIS — C7A.00 CARCINOID TUMOR METASTATIC TO INTRA-ABDOMINAL LYMPH NODE: ICD-10-CM

## 2022-05-16 DIAGNOSIS — C7A.012 MALIGNANT CARCINOID TUMOR OF ILEUM: ICD-10-CM

## 2022-05-16 DIAGNOSIS — C7B.8 SECONDARY NEUROENDOCRINE TUMOR OF LIVER: ICD-10-CM

## 2022-05-16 DIAGNOSIS — K90.89 BILE SALT-INDUCED DIARRHEA: ICD-10-CM

## 2022-05-16 PROCEDURE — 36415 COLL VENOUS BLD VENIPUNCTURE: CPT | Performed by: SURGERY

## 2022-05-16 PROCEDURE — 83519 RIA NONANTIBODY: CPT | Performed by: SURGERY

## 2022-05-16 PROCEDURE — 83519 RIA NONANTIBODY: CPT | Mod: 59 | Performed by: SURGERY

## 2022-05-16 PROCEDURE — 84260 ASSAY OF SEROTONIN: CPT | Performed by: SURGERY

## 2022-05-16 PROCEDURE — 82542 COL CHROMOTOGRAPHY QUAL/QUAN: CPT | Performed by: SURGERY

## 2022-05-20 LAB — SEROTONIN: 681 NG/ML

## 2022-05-26 LAB
NEUROKININ A SERPL-MCNC: <5 PG/ML (ref 0–40)
PANCREASTATIN SERPL-MCNC: 93 PG/ML (ref 10–135)

## 2022-05-28 LAB — 5OH-INDOLEACETATE SERPL-MCNC: 14 NG/ML

## 2022-07-14 ENCOUNTER — TELEPHONE (OUTPATIENT)
Dept: NEUROLOGY | Facility: CLINIC | Age: 47
End: 2022-07-14
Payer: OTHER GOVERNMENT

## 2022-07-14 NOTE — TELEPHONE ENCOUNTER
Left a detailed vm with the phone number here and letting patient know we needed to either r/s or have him see Dr. Guardado at [facility].

## 2022-08-11 ENCOUNTER — ANESTHESIA EVENT (OUTPATIENT)
Dept: SURGERY | Facility: OTHER | Age: 47
End: 2022-08-11
Payer: OTHER GOVERNMENT

## 2022-08-11 RX ORDER — OXYCODONE AND ACETAMINOPHEN 10; 325 MG/1; MG/1
1 TABLET ORAL EVERY 6 HOURS PRN
Status: ON HOLD | COMMUNITY
End: 2022-08-12 | Stop reason: HOSPADM

## 2022-08-11 RX ORDER — CHOLESTYRAMINE 4 G/9G
4 POWDER, FOR SUSPENSION ORAL
COMMUNITY

## 2022-08-11 NOTE — PRE ADMISSION SCREENING
Pre admit phone call completed.    Instructions given to patient about NPO status as follows:     The evening before surgery do not eat anything after 9 p.m. ( this includes hard candy, chewing gum and mints).  You may only have GATORADE, POWERADE AND WATER from 9 p.m. until you leave your home. DO NOT  DRINK ANY LIQUIDS ON THE WAY TO THE HOSPITAL.      Patient was also instructed on the below information:    Park in the Parking lot behind the hospital or in the [employer] [facility] across the street from the parking lot.  Parking is complimentary.  If you will be discharged the same day as your procedure, please arrange for a responsible adult to drive you home or  to accompany you if traveling by taxi.  YOU WILL NOT BE PERMITTED TO DRIVE OR TO LEAVE THE HOSPITAL ALONE AFTER SURGERY.  It is strongly recommended that you arrange for someone to remain with you for the first 24 hrs following your surgery.    Patient verbalized understanding of above instructions.

## 2022-08-12 ENCOUNTER — HOSPITAL ENCOUNTER (OUTPATIENT)
Facility: OTHER | Age: 47
Discharge: HOME | End: 2022-08-12
Attending: ORTHOPAEDIC SURGERY | Admitting: ORTHOPAEDIC SURGERY
Payer: OTHER GOVERNMENT

## 2022-08-12 ENCOUNTER — ANESTHESIA (OUTPATIENT)
Dept: SURGERY | Facility: OTHER | Age: 47
End: 2022-08-12
Payer: OTHER GOVERNMENT

## 2022-08-12 DIAGNOSIS — S82.842A BIMALLEOLAR ANKLE FRACTURE, LEFT, CLOSED, INITIAL ENCOUNTER: ICD-10-CM

## 2022-08-12 PROCEDURE — 37000008 HC ANESTHESIA 1ST 15 MINUTES: Performed by: ORTHOPAEDIC SURGERY

## 2022-08-12 PROCEDURE — 36000709 HC OR TIME LEV III EA ADD 15 MIN: Performed by: ORTHOPAEDIC SURGERY

## 2022-08-12 PROCEDURE — 71000016 HC POSTOP RECOV ADDL HR: Performed by: ORTHOPAEDIC SURGERY

## 2022-08-12 PROCEDURE — 25000003 PHARM REV CODE 250: Performed by: ANESTHESIOLOGY

## 2022-08-12 PROCEDURE — C1713 ANCHOR/SCREW BN/BN,TIS/BN: HCPCS | Performed by: ORTHOPAEDIC SURGERY

## 2022-08-12 PROCEDURE — 63600175 PHARM REV CODE 636 W HCPCS: Performed by: ANESTHESIOLOGY

## 2022-08-12 PROCEDURE — 01480 ANES OPEN PX LOWER L/A/F NOS: CPT | Performed by: ORTHOPAEDIC SURGERY

## 2022-08-12 PROCEDURE — 36000708 HC OR TIME LEV III 1ST 15 MIN: Performed by: ORTHOPAEDIC SURGERY

## 2022-08-12 PROCEDURE — 37000009 HC ANESTHESIA EA ADD 15 MINS: Performed by: ORTHOPAEDIC SURGERY

## 2022-08-12 PROCEDURE — C1769 GUIDE WIRE: HCPCS | Performed by: ORTHOPAEDIC SURGERY

## 2022-08-12 PROCEDURE — 71000039 HC RECOVERY, EACH ADD'L HOUR: Performed by: ORTHOPAEDIC SURGERY

## 2022-08-12 PROCEDURE — 25000003 PHARM REV CODE 250: Performed by: NURSE ANESTHETIST, CERTIFIED REGISTERED

## 2022-08-12 PROCEDURE — 71000015 HC POSTOP RECOV 1ST HR: Performed by: ORTHOPAEDIC SURGERY

## 2022-08-12 PROCEDURE — 63600175 PHARM REV CODE 636 W HCPCS

## 2022-08-12 PROCEDURE — 64447 NJX AA&/STRD FEMORAL NRV IMG: CPT | Mod: 59,LT | Performed by: ANESTHESIOLOGY

## 2022-08-12 PROCEDURE — 63600175 PHARM REV CODE 636 W HCPCS: Performed by: NURSE ANESTHETIST, CERTIFIED REGISTERED

## 2022-08-12 PROCEDURE — 76942 ECHO GUIDE FOR BIOPSY: CPT | Mod: 59 | Performed by: ANESTHESIOLOGY

## 2022-08-12 PROCEDURE — 27201423 OPTIME MED/SURG SUP & DEVICES STERILE SUPPLY: Performed by: ORTHOPAEDIC SURGERY

## 2022-08-12 PROCEDURE — 71000033 HC RECOVERY, INTIAL HOUR: Performed by: ORTHOPAEDIC SURGERY

## 2022-08-12 DEVICE — SCREW LP CORTICAL 2.7X16MM SS: Type: IMPLANTABLE DEVICE | Site: ANKLE | Status: FUNCTIONAL

## 2022-08-12 DEVICE — IMPLANTABLE DEVICE: Type: IMPLANTABLE DEVICE | Site: ANKLE | Status: FUNCTIONAL

## 2022-08-12 DEVICE — SCREW LP LOCKIING TM 3.5X14MM: Type: IMPLANTABLE DEVICE | Site: ANKLE | Status: FUNCTIONAL

## 2022-08-12 DEVICE — SCREW LP LOCKIING TM 3.5X18MM: Type: IMPLANTABLE DEVICE | Site: ANKLE | Status: FUNCTIONAL

## 2022-08-12 DEVICE — SCREW LP LOCKIING TM 3.5X22MM: Type: IMPLANTABLE DEVICE | Site: ANKLE | Status: FUNCTIONAL

## 2022-08-12 DEVICE — KIT KNTLS T-ROPE SYNDSMOS DRVR: Type: IMPLANTABLE DEVICE | Site: ANKLE | Status: FUNCTIONAL

## 2022-08-12 RX ORDER — PROPOFOL 10 MG/ML
VIAL (ML) INTRAVENOUS
Status: DISCONTINUED | OUTPATIENT
Start: 2022-08-12 | End: 2022-08-12

## 2022-08-12 RX ORDER — MIDAZOLAM HYDROCHLORIDE 1 MG/ML
INJECTION INTRAMUSCULAR; INTRAVENOUS
Status: DISCONTINUED | OUTPATIENT
Start: 2022-08-12 | End: 2022-08-12

## 2022-08-12 RX ORDER — OCTREOTIDE ACETATE 500 UG/ML
500 INJECTION, SOLUTION INTRAVENOUS; SUBCUTANEOUS ONCE
Status: COMPLETED | OUTPATIENT
Start: 2022-08-12 | End: 2022-08-12

## 2022-08-12 RX ORDER — SODIUM CHLORIDE 9 MG/ML
INJECTION, SOLUTION INTRAVENOUS CONTINUOUS
Status: DISCONTINUED | OUTPATIENT
Start: 2022-08-13 | End: 2022-08-12 | Stop reason: HOSPADM

## 2022-08-12 RX ORDER — HYDROMORPHONE HYDROCHLORIDE 2 MG/ML
0.4 INJECTION, SOLUTION INTRAMUSCULAR; INTRAVENOUS; SUBCUTANEOUS EVERY 5 MIN PRN
Status: DISCONTINUED | OUTPATIENT
Start: 2022-08-12 | End: 2022-08-12 | Stop reason: HOSPADM

## 2022-08-12 RX ORDER — OXYCODONE HYDROCHLORIDE 5 MG/1
5 TABLET ORAL
Status: DISCONTINUED | OUTPATIENT
Start: 2022-08-12 | End: 2022-08-12 | Stop reason: HOSPADM

## 2022-08-12 RX ORDER — ROPIVACAINE HYDROCHLORIDE 5 MG/ML
INJECTION, SOLUTION EPIDURAL; INFILTRATION; PERINEURAL
Status: COMPLETED | OUTPATIENT
Start: 2022-08-12 | End: 2022-08-12

## 2022-08-12 RX ORDER — SODIUM CHLORIDE 0.9 % (FLUSH) 0.9 %
10 SYRINGE (ML) INJECTION
Status: DISCONTINUED | OUTPATIENT
Start: 2022-08-12 | End: 2022-08-12 | Stop reason: HOSPADM

## 2022-08-12 RX ORDER — LIDOCAINE HYDROCHLORIDE 10 MG/ML
INJECTION, SOLUTION EPIDURAL; INFILTRATION; INTRACAUDAL; PERINEURAL
Status: COMPLETED | OUTPATIENT
Start: 2022-08-12 | End: 2022-08-12

## 2022-08-12 RX ORDER — DIPHENHYDRAMINE HYDROCHLORIDE 50 MG/ML
INJECTION INTRAMUSCULAR; INTRAVENOUS
Status: DISCONTINUED | OUTPATIENT
Start: 2022-08-12 | End: 2022-08-12

## 2022-08-12 RX ORDER — OXYCODONE AND ACETAMINOPHEN 5; 325 MG/1; MG/1
1 TABLET ORAL EVERY 4 HOURS PRN
Qty: 40 TABLET | Refills: 0 | Status: SHIPPED | OUTPATIENT
Start: 2022-08-12

## 2022-08-12 RX ORDER — SODIUM CHLORIDE 0.9 % (FLUSH) 0.9 %
3 SYRINGE (ML) INJECTION
Status: DISCONTINUED | OUTPATIENT
Start: 2022-08-12 | End: 2022-08-12 | Stop reason: HOSPADM

## 2022-08-12 RX ORDER — DEXMEDETOMIDINE HYDROCHLORIDE 100 UG/ML
INJECTION, SOLUTION INTRAVENOUS
Status: DISCONTINUED | OUTPATIENT
Start: 2022-08-12 | End: 2022-08-12

## 2022-08-12 RX ORDER — SODIUM CHLORIDE, SODIUM LACTATE, POTASSIUM CHLORIDE, CALCIUM CHLORIDE 600; 310; 30; 20 MG/100ML; MG/100ML; MG/100ML; MG/100ML
INJECTION, SOLUTION INTRAVENOUS CONTINUOUS
Status: DISCONTINUED | OUTPATIENT
Start: 2022-08-12 | End: 2022-08-12 | Stop reason: HOSPADM

## 2022-08-12 RX ORDER — ONDANSETRON 2 MG/ML
INJECTION INTRAMUSCULAR; INTRAVENOUS
Status: DISCONTINUED | OUTPATIENT
Start: 2022-08-12 | End: 2022-08-12

## 2022-08-12 RX ORDER — DEXAMETHASONE SODIUM PHOSPHATE 4 MG/ML
INJECTION, SOLUTION INTRA-ARTICULAR; INTRALESIONAL; INTRAMUSCULAR; INTRAVENOUS; SOFT TISSUE
Status: DISCONTINUED | OUTPATIENT
Start: 2022-08-12 | End: 2022-08-12

## 2022-08-12 RX ORDER — LIDOCAINE HCL/PF 100 MG/5ML
SYRINGE (ML) INTRAVENOUS
Status: DISCONTINUED | OUTPATIENT
Start: 2022-08-12 | End: 2022-08-12

## 2022-08-12 RX ORDER — MUPIROCIN 20 MG/G
OINTMENT TOPICAL 2 TIMES DAILY
Status: DISCONTINUED | OUTPATIENT
Start: 2022-08-12 | End: 2022-08-12 | Stop reason: HOSPADM

## 2022-08-12 RX ORDER — FENTANYL CITRATE 50 UG/ML
INJECTION, SOLUTION INTRAMUSCULAR; INTRAVENOUS
Status: DISCONTINUED | OUTPATIENT
Start: 2022-08-12 | End: 2022-08-12

## 2022-08-12 RX ORDER — ACETAMINOPHEN 500 MG
1000 TABLET ORAL
Status: COMPLETED | OUTPATIENT
Start: 2022-08-12 | End: 2022-08-12

## 2022-08-12 RX ORDER — SODIUM CHLORIDE 9 MG/ML
INJECTION, SOLUTION INTRAVENOUS CONTINUOUS
Status: DISCONTINUED | OUTPATIENT
Start: 2022-08-12 | End: 2022-08-12 | Stop reason: HOSPADM

## 2022-08-12 RX ORDER — PROCHLORPERAZINE EDISYLATE 5 MG/ML
5 INJECTION INTRAMUSCULAR; INTRAVENOUS EVERY 30 MIN PRN
Status: DISCONTINUED | OUTPATIENT
Start: 2022-08-12 | End: 2022-08-12 | Stop reason: HOSPADM

## 2022-08-12 RX ORDER — CEFAZOLIN SODIUM 1 G/3ML
2 INJECTION, POWDER, FOR SOLUTION INTRAMUSCULAR; INTRAVENOUS
Status: DISCONTINUED | OUTPATIENT
Start: 2022-08-12 | End: 2022-08-12 | Stop reason: HOSPADM

## 2022-08-12 RX ORDER — LIDOCAINE HYDROCHLORIDE 10 MG/ML
0.5 INJECTION, SOLUTION EPIDURAL; INFILTRATION; INTRACAUDAL; PERINEURAL ONCE
Status: DISCONTINUED | OUTPATIENT
Start: 2022-08-12 | End: 2022-08-12 | Stop reason: HOSPADM

## 2022-08-12 RX ORDER — DIPHENHYDRAMINE HYDROCHLORIDE 50 MG/ML
12.5 INJECTION INTRAMUSCULAR; INTRAVENOUS EVERY 30 MIN PRN
Status: DISCONTINUED | OUTPATIENT
Start: 2022-08-12 | End: 2022-08-12 | Stop reason: HOSPADM

## 2022-08-12 RX ADMIN — ROPIVACAINE HYDROCHLORIDE 15 ML: 5 INJECTION, SOLUTION EPIDURAL; INFILTRATION; PERINEURAL at 09:08

## 2022-08-12 RX ADMIN — PROPOFOL 200 MG: 10 INJECTION, EMULSION INTRAVENOUS at 10:08

## 2022-08-12 RX ADMIN — OXYCODONE 5 MG: 5 TABLET ORAL at 01:08

## 2022-08-12 RX ADMIN — DEXAMETHASONE SODIUM PHOSPHATE 8 MG: 4 INJECTION, SOLUTION INTRAMUSCULAR; INTRAVENOUS at 10:08

## 2022-08-12 RX ADMIN — ACETAMINOPHEN 500 MG: 500 TABLET, FILM COATED ORAL at 08:08

## 2022-08-12 RX ADMIN — SODIUM CHLORIDE, SODIUM LACTATE, POTASSIUM CHLORIDE, AND CALCIUM CHLORIDE: 600; 310; 30; 20 INJECTION, SOLUTION INTRAVENOUS at 11:08

## 2022-08-12 RX ADMIN — LIDOCAINE HYDROCHLORIDE 100 MG: 20 INJECTION, SOLUTION INTRAVENOUS at 10:08

## 2022-08-12 RX ADMIN — DIPHENHYDRAMINE HYDROCHLORIDE 12.5 MG: 50 INJECTION, SOLUTION INTRAMUSCULAR; INTRAVENOUS at 10:08

## 2022-08-12 RX ADMIN — LIDOCAINE HYDROCHLORIDE 50 MG: 10 INJECTION, SOLUTION EPIDURAL; INFILTRATION; INTRACAUDAL; PERINEURAL at 09:08

## 2022-08-12 RX ADMIN — FENTANYL CITRATE 50 MCG: 50 INJECTION, SOLUTION INTRAMUSCULAR; INTRAVENOUS at 10:08

## 2022-08-12 RX ADMIN — MIDAZOLAM HYDROCHLORIDE 1 MG: 1 INJECTION, SOLUTION INTRAMUSCULAR; INTRAVENOUS at 09:08

## 2022-08-12 RX ADMIN — SODIUM CHLORIDE, SODIUM LACTATE, POTASSIUM CHLORIDE, AND CALCIUM CHLORIDE: 600; 310; 30; 20 INJECTION, SOLUTION INTRAVENOUS at 09:08

## 2022-08-12 RX ADMIN — GLYCOPYRROLATE 0.2 MG: 0.2 INJECTION, SOLUTION INTRAMUSCULAR; INTRAVITREAL at 10:08

## 2022-08-12 RX ADMIN — OCTREOTIDE ACETATE 500 MCG: 500 INJECTION, SOLUTION INTRAVENOUS; SUBCUTANEOUS at 10:08

## 2022-08-12 RX ADMIN — PROPOFOL 50 MG: 10 INJECTION, EMULSION INTRAVENOUS at 12:08

## 2022-08-12 RX ADMIN — FENTANYL CITRATE 50 MCG: 50 INJECTION, SOLUTION INTRAMUSCULAR; INTRAVENOUS at 09:08

## 2022-08-12 RX ADMIN — ONDANSETRON HYDROCHLORIDE 8 MG: 2 INJECTION INTRAMUSCULAR; INTRAVENOUS at 10:08

## 2022-08-12 RX ADMIN — DEXMEDETOMIDINE HYDROCHLORIDE 12 MCG: 100 INJECTION, SOLUTION, CONCENTRATE INTRAVENOUS at 10:08

## 2022-08-12 NOTE — PATIENT INSTRUCTIONS
Kvng Stokes M.D.  Willy Valera M.D.  Omero Valdivia M.D.          [address] [place] [address] [address]  [place] [address]  Phone: [phone]  Fax: [phone]           DISCHARGE INSTRUCTIONS for Foot/Ankle Surgery              Call our office ([phone]) immediately if you experience any of the following:       Excessive bleeding or pus like drainage at the incision site       Uncontrollable pain not relieved by pain medication       Excessive swelling or redness at the incision site       Fever above 101.5 degrees not controlled with Tylenol or Motrin       Shortness of Breath       Any foul odor or blistering from the surgery site    FOR EMERGENCIES: If any unusual problems or difficulties occur, call our office at [phone], or [phone] (After hours) or contact 911 at any time for emergencies    1.   Diet: Eat a liquid / bland diet for the first day after surgery. Progress your to your regular diet as tolerated. Constipation may occur with Narcotic usage, contact our office if you are experiencing constipation.    2.   Pain Management: Ice, pain medications and anesthesia injections are used to manage your post-operative pain.     Medications: You were given one or more narcotic pain medications before leaving the hospital. Have the prescriptions filled at a pharmacy on your way home and follow the instructions on the bottles.     Narcotic Medication (usually Norco - hydrocodone or Percocet - oxycodone): Take this medication if needed to relieve severe pain. Take 1 pill every 4 hours. If your pain is not relieved you make take a second pill at your next dose. Always take with food.     *Take note: if you find you are taking more than 1 pill at EACH dose, contact the office as        the amount of acetaminophen may exceed appropriate levels.     Nausea / Vomiting: For this issue, contact our office for a separate prescription that may be called in to your pharmacy.     Cold Therapy: You may  begin using ice to the affected area over your dressing throughout the day for the first 3-5 days and then as needed to help relieve pain and control swelling. Use it as often as 20 minutes ONCE every hour. You can continue this for several weeks following surgery if needed.     Regional Anesthesia Injections (Blocks): You may have been given a regional nerve block either before or after surgery. This may make your entire leg numb for 24-36 hours.                            * Proceed with caution when bearing weight on your leg.     3.  Constipation: During your hospital stay, you will be given a bowel regulating medication known as Miralax (Polyethylene Glycol 3350 or PEG 3350), this is given once daily and should be taken daily as long as you are taking pain medicine. It is an odorless, colorless powder and dissolved without any aftertaste. This helps regulate bowel function and is important to counteract the constipation effect of the pain medicine you will be given after surgery. If you continue to experience constipation after you are discharged, follow this recommendation:  Miralax - 1 cap full mixed with any liquid once daily  If no bowel movement OR very painful/difficult bowel movement within 2-3 days, begin Miralax - 1 cap full mixed with any liquid twice daily, then once a normal bowel movement occurs, decrease back to once daily  If no bowel movement with the twice daily regimen, take Miralax every 8 hours until a bowel movement occurs, then decrease back to once daily    4. Blood Clot Prevention: Blood clots are potential complications following any surgery. A blood clot from your leg can travel to your lungs and cause serious health complications. Preventing a blood clot from forming is critical and we do this by doing taking the following actions:  Exercising and staying active (moving about)  Wearing support stockings  The symptoms of a blood clot include:   Pain and / or redness in your calf and leg  unrelated to your incision.   Increased swelling of your thigh, calf, ankle, or foot.   Increased skin temperature at the site of the incision.   Shortness of breath and chest pain or pain when breathing.    5.   Return visit: Please schedule your return visit to Dr. Stokes's office approximately 10-14 days after your procedure.    6.   Activity: Limit your activity during the first 48 hours, keep your leg elevated with pillows under your heel. After the first 48 hours at home, increase your activity level based on your symptoms.    7.   Dressing Change (ONLY if no Splint/Cast present): Wounds are usually closed with either steri-strips, sutures or staples. It is normal for some blood / drainage to be seen on the dressings. It is also normal for you to see apparent bruising on the skin around your incision. If present, leave the steri-strip tape across the incisions. If you are concerned by the drainage or the appearance of your knee, please call one of the numbers listed above. You can remove the dressing (not the steri-strips) on the 3rd day after surgery. For larger incisions, you will need to keep it away from water until the stitches or staples are removed. You can use an ace bandage for support and compression if desired.     8.   Splint / Cast: You may have been sent home in a post-operative splint or cast. Do NOT get the cast wet. If the cast does become wet, contact our office immediately to avoid any complications.    9.   Showering (ONLY if no Splint/Cast present): You may shower on the 2nd day after surgery if the wound is dry and clean, but do not let the wound soak in water until sutures are removed. Do not submerge in any water until after your 2 week postoperative appointment in clinic.     *SPLINTS/CASTS MUST REMAIN CLEAN AND DRY    10.   Weight Bearing: You may have been sent home with crutches. If instructed (see below), use the crutches at all times unless at complete rest.      [x] Non-weight  bearing for     6 weeks (you may touch your toes to the floor)    11.  Physical Therapy (When applicable): Rehabilitation is an essential component to your recovery from surgery. You will need formal physical therapy. If you require formal physical therapy, you are encouraged to find a Physical Therapy center that is both near your residence and comfortable to you. Please notify us if you have a preference of a Physical Therapy clinic. Please contact the office at the numbers above if you have any questions or if there is any delay in the start of Physical Therapy.

## 2022-08-12 NOTE — PLAN OF CARE
Aubrey Gallagher has met all discharge criteria from Phase II. Vital Signs are stable, ambulating  without difficulty. Discharge instructions given, patient verbalized understanding. Discharged from facility via wheelchair in stable condition.

## 2022-08-12 NOTE — DISCHARGE SUMMARY
"[facility] ([place])  Discharge Note  Short Stay    Procedure(s) (LRB):  ORIF, ANKLE - LEFT ORIF BIMALLEOLAR ANKLE FRACTURE (Left)    OUTCOME: Patient tolerated treatment/procedure well without complication and is now ready for discharge.    DISPOSITION: Home or Self Care    FINAL DIAGNOSIS:  Closed bimalleolar fracture of left ankle    FOLLOWUP: In clinic    DISCHARGE INSTRUCTIONS:    Discharge Procedure Orders   CRUTCHES FOR HOME USE     Order Specific Question Answer Comments   Type: Axillary    Height: 5' 9.5" (1.765 m)    Weight: 113.4 kg (250 lb)    Length of need (1-99 months): 3 months     Diet general     Keep surgical extremity elevated     Ice to affected area     No driving, operating heavy equipment or signing legal documents while taking pain medication     Remove dressing in 72 hours     Call MD for:  temperature >100.4     Call MD for:  persistent nausea and vomiting     Call MD for:  severe uncontrolled pain     Call MD for:  difficulty breathing, headache or visual disturbances     Call MD for:  redness, tenderness, or signs of infection (pain, swelling, redness, odor or green/yellow discharge around incision site)     Call MD for:  hives     Call MD for:  persistent dizziness or light-headedness     Call MD for:  extreme fatigue     Non weight bearing      "

## 2022-08-12 NOTE — OP NOTE
Orthopedic Operative Note    Preoperative diagnosis:  Displaced left bimalleolar ankle fracture with syndesmosis disruption    Postoperative diagnosis:  Same    Procedure:  Open reduction internal fixation left ankle fracture/bimalleolar with syndesmosis fixation.    Surgeon: Kvng Stokes     Assistants: ALIX Norton    Anesthesia: General    Estimated blood loss: minimal    Complications:  None    History:  47-year-old gentleman history of left pilon ankle fracture.  Because of displacement and pain, surgical intervention was recommended.  The risks, options, benefits of surgery were explained to the patient.  He stated understanding and wished to proceed.  The risks include:  Bleeding, infection, blood clot, injury to nerve or blood vessel, continued pain, stiffness, malunion, nonunion, painful hardware.    Operative course:  The patient was identified in preop holding area.  The left ankle was marked as correct.  The patient was taken to the operating room, after adequate anesthesia, antibiotics were infused.  The left leg was sterilely prepped and draped in the usual sterile fashion.  The leg was exsanguinated and tourniquet inflated to 250 mmHg.  We made a standard lateral approach to the fibula.  He had a markedly comminuted spiral fibular fracture with at least 4 pieces.  This was pieced together and lag screw was placed across the main fracture site near the Muniz B fracture.  He had a separate long butterfly piece which ran proximal to this from anterior was pieced together in cerclage with suture.  It was too small to get a lag screw.  At this point we placed Arthrex lateral plate across the fibula site fixator in place proximally and distally with cortical screws with excellent reduction.  We then filled in 5 distal screws locking and also placed a proximal cortical screws.  Excellent reduction was obtained.  We test the syndesmosis and there was still some instability.  Therefore we decided to  drill a tight rope.  We drilled across the fibula into the tibia and passed our tight rope with excellent reduction of our syndesmosis after cinching it down.  On the medial side we placed two percutaneous partially-threaded cancellous screws under fluoroscopic guidance on AP and lateral view showing good reduction of the medial malleolus.  We had good compression.  At this point we irrigated the wounds.  We closed the medial side with nylon suture.  Lateral side with 2-0 Vicryl suture and ZipLine closure.  We placed a sterile bandage in the splint.  The patient was awakened and taken to the recovery room in stable condition.  Instrument, needle, sponge counts were correct.  There were no complications.    ALIX Norton participated in the important parts of this case.  This included:  Positioning the patient, prepping the patient, reducing the fracture, plate placement screw placement, wound closure, bandage placement.

## 2022-08-12 NOTE — ANESTHESIA PROCEDURE NOTES
Peripheral Block    Patient location during procedure: pre-op   Block not for primary anesthetic.  Reason for block: at surgeon's request and post-op pain management   Post-op Pain Location: Left Ankle Pain   Start time: 8/12/2022 9:46 AM  Timeout: 8/12/2022 9:46 AM   End time: 8/12/2022 9:53 AM    Staffing  Authorizing Provider: Tomas Marquez MD  Performing Provider: Tomas Marquez MD    Preanesthetic Checklist  Completed: patient identified, IV checked, site marked, risks and benefits discussed, surgical consent, monitors and equipment checked, pre-op evaluation and timeout performed  Peripheral Block  Patient position: supine  Prep: ChloraPrep  Patient monitoring: heart rate, cardiac monitor, continuous pulse ox, continuous capnometry and frequent blood pressure checks  Block type: popliteal  Laterality: left  Injection technique: single shot  Needle  Needle type: Stimuplex   Needle gauge: 21 G  Needle length: 4 in  Needle localization: anatomical landmarks and ultrasound guidance   -ultrasound image captured on disc.  Assessment  Injection assessment: negative aspiration, negative parasthesia and local visualized surrounding nerve  Paresthesia pain: none  Heart rate change: no  Slow fractionated injection: yes  Pain Tolerance: comfortable throughout block and no complaints  Medications:    Medications: ropivacaine (NAROPIN) injection 0.5% - Perineural   15 mL - 8/12/2022 9:53:00 AM  lidocaine (PF) injection 1% - Other   50 mg - 8/12/2022 9:53:00 AM    Additional Notes  VSS.  [facility] RN monitoring vitals throughout procedure.  Patient tolerated procedure well.

## 2022-08-12 NOTE — ANESTHESIA PROCEDURE NOTES
Peripheral Block    Patient location during procedure: pre-op   Block not for primary anesthetic.  Reason for block: at surgeon's request and post-op pain management   Post-op Pain Location: Left Ankle Pain   Start time: 8/12/2022 9:54 AM  Timeout: 8/12/2022 9:54 AM   End time: 8/12/2022 9:58 AM    Staffing  Authorizing Provider: Tomas Marquez MD  Performing Provider: Tomas Marquez MD    Preanesthetic Checklist  Completed: patient identified, IV checked, site marked, risks and benefits discussed, surgical consent, monitors and equipment checked, pre-op evaluation and timeout performed  Peripheral Block  Patient position: supine  Prep: ChloraPrep  Patient monitoring: heart rate, cardiac monitor, continuous pulse ox, continuous capnometry and frequent blood pressure checks  Block type: adductor canal  Laterality: left  Injection technique: single shot  Needle  Needle type: Stimuplex   Needle gauge: 21 G  Needle length: 4 in  Needle localization: anatomical landmarks and ultrasound guidance   -ultrasound image captured on disc.  Assessment  Injection assessment: negative aspiration, negative parasthesia and local visualized surrounding nerve  Paresthesia pain: none  Heart rate change: no  Slow fractionated injection: yes  Pain Tolerance: comfortable throughout block and no complaints  Medications:    Medications: ropivacaine (NAROPIN) injection 0.5% - Perineural   15 mL - 8/12/2022 9:57:00 AM  lidocaine (PF) injection 1% - Other   50 mg - 8/12/2022 9:57:00 AM    Additional Notes  VSS.  [facility] RN monitoring vitals throughout procedure.  Patient tolerated procedure well.

## 2022-08-12 NOTE — ANESTHESIA POSTPROCEDURE EVALUATION
Anesthesia Post Evaluation    Patient: Aubrey Gallagher    Procedure(s) Performed: Procedure(s) (LRB):  ORIF, ANKLE - LEFT ORIF BIMALLEOLAR ANKLE FRACTURE (Left)    Final Anesthesia Type: general      Patient location during evaluation: PACU  Patient participation: Yes- Able to Participate  Level of consciousness: awake and alert  Post-procedure vital signs: reviewed and stable  Pain management: adequate  Airway patency: patent    PONV status at discharge: No PONV  Anesthetic complications: no      Cardiovascular status: blood pressure returned to baseline  Respiratory status: spontaneous ventilation  Hydration status: euvolemic  Follow-up not needed.          Vitals Value Taken Time   /67 08/12/22 1315   Temp 36.3 °C (97.3 °F) 08/12/22 1228   Pulse 70 08/12/22 1326   Resp 16 08/12/22 1301   SpO2 97 % 08/12/22 1326   Vitals shown include unvalidated device data.      No case tracking events are documented in the log.      Pain/Rossy Score: Pain Rating Prior to Med Admin: 2 (8/12/2022  8:58 AM)  Rossy Score: 6 (8/12/2022 12:58 PM)

## 2022-08-12 NOTE — H&P
[facility]  History & Physical  Orthopedic Surgery    Subjective:     Chief Complaint/Reason for Admission: Left ankle pain.    History of Present Illness:  Aubrey Gallagher is a 47 y.o. y/o male presenting with a history of pain in the left ankle. Risks and benefits of surgery were discussed and the patient wishes to proceed with surgery at this time.      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    Current Outpatient Medications:     omeprazole (PRILOSEC) 20 MG capsule, Take 20 mg by mouth once daily., Disp: , Rfl:     oxyCODONE-acetaminophen (PERCOCET)  mg per tablet, Take 1 tablet by mouth every 6 (six) hours as needed for Pain., Disp: , Rfl:     polyethylene glycol (GLYCOLAX) 17 gram/dose powder, Mix 1 capful (17 g) with fluids and drink by mouth once daily., Disp: 510 g, Rfl: 0    cholestyramine (QUESTRAN) 4 gram packet, Take 4 g by mouth every meal as needed. 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     Social History     Tobacco Use    Smoking status: Never Smoker    Smokeless tobacco: Never Used   Substance Use Topics    Alcohol use: Yes     Comment: rarely        Review of Systems:  Constitutional: negative for fever, weight loss  Cardiovascular: negative for chest pain, edema  Respiratory: negative for shortness of breath, cough  HEENT: negative for headaches, vision problems  Skin: negative for bruising, skin infections, rashes  GI: negative for nausea, vomiting  : negative for dysuria, hematuria  Psych: negative for anxiety, depression  Musculoskeletal: positive for joint pain  Endocrine: negative for cold intolerance, excessive thirst  Hematologic: negative for prolonged bleeding, use of blood thinners    OBJECTIVE:     General Appearance:    Alert, cooperative, no distress, appears stated age   Vital Signs:            Head:      Vitals:    08/11/22 1025 08/11/22 1026   Weight: 113.4 kg (250 lb) 113.4 kg (250 lb)   Height: 5' 9.5" (1.765 m) 5' 9.5" (1.765 m)        Normocephalic, without obvious abnormality, atraumatic   Eyes:    PERRL, conjunctiva/corneas clear, both eyes        Nose:   Nares normal, no drainage or sinus tenderness   Throat:   Lips, mucosa, and tongue normal; teeth and gums normal   Neck:   Supple, normal ROM   Back:     Symmetric, no curvature, ROM normal   Lungs:     CTA bilaterally, respirations unlabored   Chest wall:    No tenderness or deformity   Heart:    Regular rate and rhythm, S1 and S2 normal, no murmur, rub   or gallop   Abdomen:     Soft, non-tender   Extremities:   See clinic note   Pulses:   2+ and symmetric all extremities   Skin:   Skin color, texture, turgor normal, no rashes or lesions           Imaging Review:  Imaging reviewed    ASSESSMENT/PLAN:     Plan/Surgical Procedure: Left ankle ORIF    Surgery Date / " Location: 8/12/22 by Dr. Stokes at [facility]    Pre-op clearance per Anesthesia    DVT Prophylaxis: No blood thinners will be required post-operatively    Pt will d/c NSAIDs, Aspirin-containing products 7 days prior to procedure.    Informed written consent has been signed, patient wishes to proceed at this time.      Hipolito Nash PA-C  Orthopedics

## 2022-08-12 NOTE — TRANSFER OF CARE
"Anesthesia Transfer of Care Note    Patient: Aubrey Gallagher    Procedure(s) Performed: Procedure(s) (LRB):  ORIF, ANKLE - LEFT ORIF BIMALLEOLAR ANKLE FRACTURE (Left)    Patient location: PACU    Anesthesia Type: general    Transport from OR: Transported from OR on 6-10 L/min O2 by face mask with adequate spontaneous ventilation    Post pain: adequate analgesia    Post assessment: no apparent anesthetic complications    Post vital signs: stable    Level of consciousness: responds to stimulation    Nausea/Vomiting: no nausea/vomiting    Complications: none    Transfer of care protocol was followed      Last vitals:   Visit Vitals  /67 (BP Location: Right arm, Patient Position: Lying)   Pulse 74   Temp 36.3 °C (97.3 °F) (Oral)   Resp 16   Ht 5' 9.5" (1.765 m)   Wt 113.4 kg (250 lb)   SpO2 97%   BMI 36.39 kg/m²     "

## 2022-08-12 NOTE — ANESTHESIA PREPROCEDURE EVALUATION
08/12/2022  Aubrey Gallagher is a 47 y.o., male.      Pre-op Assessment    I have reviewed the Patient Summary Reports.     I have reviewed the Nursing Notes. I have reviewed the NPO Status.   I have reviewed the Medications.     Review of Systems  Anesthesia Hx:  History of prior surgery of interest to airway management or planning: Previous anesthesia: General 2019 resection carcinoid tumor with general anesthesia.  Airway issues documented on chart review include mask, easy, GETA, easy direct laryngoscopy , view on direct laryngoscopy Grade I  Denies Family Hx of Anesthesia complications.   Denies Personal Hx of Anesthesia complications.   Social:  Non-Smoker    Hematology/Oncology:  Hematology Normal       -- Cancer in past history:  Oncology Comments: S/p appi, estephania, and LN resection 2019    Had some carcinoid sx's, mainly diahrea, states no sx's currently     EENT/Dental:EENT/Dental Normal   Cardiovascular:  Cardiovascular Normal     Pulmonary:  Pulmonary Normal    Renal/:  Renal/ Normal     Hepatic/GI:  Hepatic/GI Normal    Musculoskeletal:  Musculoskeletal Normal    Neurological:  Neurology Normal    Endocrine:  Endocrine Normal  Obesity / BMI > 30  Dermatological:  Skin Normal    Psych:  Psychiatric Normal           Physical Exam  General: Cooperative, Alert and Oriented    Airway:  Mallampati: II   Mouth Opening: Normal  TM Distance: Normal  Neck ROM: Normal ROM    Dental:  Intact        Anesthesia Plan  Type of Anesthesia, risks & benefits discussed:    Anesthesia Type: Gen Supraglottic Airway  Intra-op Monitoring Plan: Standard ASA Monitors  Post Op Pain Control Plan: multimodal analgesia, IV/PO Opioids PRN and peripheral nerve block  Induction:  IV  Informed Consent: Informed consent signed with the Patient and all parties understand the risks and agree with anesthesia plan.  All  questions answered.   ASA Score: 2  Anesthesia Plan Notes: Plan add/pop    zofran and octreotide prophylaxis    Ready For Surgery From Anesthesia Perspective.     .

## 2022-08-15 VITALS
DIASTOLIC BLOOD PRESSURE: 77 MMHG | WEIGHT: 250 LBS | RESPIRATION RATE: 16 BRPM | TEMPERATURE: 98 F | HEART RATE: 63 BPM | SYSTOLIC BLOOD PRESSURE: 139 MMHG | HEIGHT: 70 IN | BODY MASS INDEX: 35.79 KG/M2 | OXYGEN SATURATION: 96 %

## 2023-01-30 NOTE — TELEPHONE ENCOUNTER
----- Message from Rufina Amado sent at 4/15/2019 10:04 AM CDT -----  Contact: [phone] pt's wife Emerald   Patient's wife would like to speak with you about scheduling pt's surgery. Please call.   
Returned call to patients spouse. They are wanting to wait until October/November 2019 for patient to have surgery, but wants to know Dr. Plummer's though. Note routed to Dr. Plummer to address. Emerald has no further questions at this time.   
yes

## 2023-04-19 ENCOUNTER — PATIENT MESSAGE (OUTPATIENT)
Dept: RESEARCH | Facility: HOSPITAL | Age: 48
End: 2023-04-19
Payer: OTHER GOVERNMENT

## (undated) DEVICE — SUT PROLENE 2-0 SH 36IN BLU

## (undated) DEVICE — HEMOSTAT SURGICEL PWD 3G

## (undated) DEVICE — DRESSING AQUACEL SACRAL 9 X 9

## (undated) DEVICE — GUIDEWIRE TROCAR TIP 1.35MM
Type: IMPLANTABLE DEVICE | Site: ANKLE | Status: NON-FUNCTIONAL
Removed: 2022-08-12

## (undated) DEVICE — SUT VICRYL 2-0 8-18 CP-2

## (undated) DEVICE — DRAPE U SPLIT SHEET 54X76IN

## (undated) DEVICE — SUPPORT ULNA NERVE PROTECTOR

## (undated) DEVICE — CLOSURE SKIN STERI STRIP 1/2X4

## (undated) DEVICE — SEALER LIGASURE OPEN 5MM 23CM

## (undated) DEVICE — CONTAINER SPECIMEN STR 3 0Z

## (undated) DEVICE — Device

## (undated) DEVICE — UNDERGLOVES BIOGEL PI SIZE 8.5

## (undated) DEVICE — ZIP 24 SURGICAL SKIN CLOSURE DEVICE

## (undated) DEVICE — KIT POWDER ABSORBABLE GELATIN

## (undated) DEVICE — APPLICATOR CHLORAPREP ORN 26ML

## (undated) DEVICE — ELECTRODE REM PLYHSV RETURN 9

## (undated) DEVICE — SUT PROLENE 3-0 36 MHMH

## (undated) DEVICE — SUT MONOCRYL 3-0 PS-1

## (undated) DEVICE — GLOVE BIOGEL SKINSENSE PI 8.0

## (undated) DEVICE — CATH ALL PUR URTHL 20FR

## (undated) DEVICE — BIT DRILL 3.5MM

## (undated) DEVICE — SUT PROLENE 5-0 36IN C-1

## (undated) DEVICE — BIT DRILL SLEEVE 2.0MM

## (undated) DEVICE — DRAPE FLUID WARMER ORS 44X44IN

## (undated) DEVICE — BANDAGE ESMARK ELASTIC ST 6X9

## (undated) DEVICE — RELOAD PROXIMATE CUT BLUE 75MM

## (undated) DEVICE — BANDAGE MATRIX HK LOOP 4IN 5YD

## (undated) DEVICE — ADHESIVE DERMABOND ADVANCED

## (undated) DEVICE — ADHESIVE MASTISOL VIAL 48/BX

## (undated) DEVICE — MANIFOLD 4 PORT

## (undated) DEVICE — TRAY FOLEY 16FR INFECTION CONT

## (undated) DEVICE — PAD ABD 8X10 STERILE

## (undated) DEVICE — DRAPE INCISE IOBAN 2 23X23IN

## (undated) DEVICE — PAD UNDERPAD 30X30

## (undated) DEVICE — SEE MEDLINE ITEM 157125

## (undated) DEVICE — BIT DRILL CALIBRATED 2.5MM

## (undated) DEVICE — TOURNIQUET SB QC DP 34X4IN

## (undated) DEVICE — GLOVE PROTEXIS LTX MICRO  7.5

## (undated) DEVICE — SEE MEDLINE ITEM 157171

## (undated) DEVICE — SPONGE SURGIFOAM 100 8.5X12X10

## (undated) DEVICE — CUTTER PROXIMATE BLUE 75MM

## (undated) DEVICE — GLOVE 8 PROTEXIS PI BLUE

## (undated) DEVICE — SET DECANTER MEDICHOICE

## (undated) DEVICE — SUT PROLENE 3-0 30SH

## (undated) DEVICE — DRAPE STERI U-SHAPED 47X51IN

## (undated) DEVICE — GLOVE BIOGEL SKINSENSE PI 7.5

## (undated) DEVICE — STOCKINETTE TUBULAR 2PL 6 X 4

## (undated) DEVICE — SEE L#120831

## (undated) DEVICE — SUT VICRYL PLUS ANTIBACT

## (undated) DEVICE — DRAPE THREE-QTR REINF 53X77IN

## (undated) DEVICE — SUT 1 48IN PDS II VIO MONO

## (undated) DEVICE — NDL 22GA X1 1/2 REG BEVEL

## (undated) DEVICE — SUT 2 60IN PROLENE BL MONO

## (undated) DEVICE — SUT 4/0 27IN PDS II VIO MON

## (undated) DEVICE — PAD THERAPY ANKLE WRAP ON

## (undated) DEVICE — DRESSING XEROFORM 5X9IN

## (undated) DEVICE — UNDERGLOVES BIOGEL PI SIZE 8

## (undated) DEVICE — SUT PROLENE 2-0 36IN MH BLU

## (undated) DEVICE — DRAPE C ARM 42 X 120 10/BX

## (undated) DEVICE — SPONGE COTTON TRAY 4X4IN

## (undated) DEVICE — HANDPIECE PHOTONSABER Y TIP

## (undated) DEVICE — SPLINT PLASTER FAST SET 5X30IN

## (undated) DEVICE — BIT DRILL CANN2.6MM

## (undated) DEVICE — PAD CAST SPECIALIST STRL 4